# Patient Record
Sex: MALE | Race: OTHER | NOT HISPANIC OR LATINO | URBAN - METROPOLITAN AREA
[De-identification: names, ages, dates, MRNs, and addresses within clinical notes are randomized per-mention and may not be internally consistent; named-entity substitution may affect disease eponyms.]

---

## 2018-05-01 ENCOUNTER — OUTPATIENT (OUTPATIENT)
Dept: OUTPATIENT SERVICES | Facility: HOSPITAL | Age: 63
LOS: 1 days | End: 2018-05-01
Payer: MEDICAID

## 2018-05-03 ENCOUNTER — INPATIENT (INPATIENT)
Facility: HOSPITAL | Age: 63
LOS: 6 days | Discharge: HOME | End: 2018-05-10
Attending: INTERNAL MEDICINE | Admitting: INTERNAL MEDICINE
Payer: MEDICAID

## 2018-05-03 VITALS
SYSTOLIC BLOOD PRESSURE: 121 MMHG | OXYGEN SATURATION: 100 % | RESPIRATION RATE: 16 BRPM | HEART RATE: 85 BPM | TEMPERATURE: 98 F | DIASTOLIC BLOOD PRESSURE: 88 MMHG

## 2018-05-03 DIAGNOSIS — I10 ESSENTIAL (PRIMARY) HYPERTENSION: ICD-10-CM

## 2018-05-03 DIAGNOSIS — Z29.9 ENCOUNTER FOR PROPHYLACTIC MEASURES, UNSPECIFIED: ICD-10-CM

## 2018-05-03 DIAGNOSIS — I20.0 UNSTABLE ANGINA: ICD-10-CM

## 2018-05-03 DIAGNOSIS — N17.9 ACUTE KIDNEY FAILURE, UNSPECIFIED: ICD-10-CM

## 2018-05-03 DIAGNOSIS — Z95.5 PRESENCE OF CORONARY ANGIOPLASTY IMPLANT AND GRAFT: Chronic | ICD-10-CM

## 2018-05-03 DIAGNOSIS — R07.9 CHEST PAIN, UNSPECIFIED: ICD-10-CM

## 2018-05-03 DIAGNOSIS — M1A.0290: ICD-10-CM

## 2018-05-03 DIAGNOSIS — E78.5 HYPERLIPIDEMIA, UNSPECIFIED: ICD-10-CM

## 2018-05-03 DIAGNOSIS — Z95.1 PRESENCE OF AORTOCORONARY BYPASS GRAFT: Chronic | ICD-10-CM

## 2018-05-03 LAB
ALBUMIN SERPL ELPH-MCNC: 3.9 G/DL — SIGNIFICANT CHANGE UP (ref 3.3–5)
ALP SERPL-CCNC: 66 U/L — SIGNIFICANT CHANGE UP (ref 40–120)
ALT FLD-CCNC: 11 U/L — SIGNIFICANT CHANGE UP (ref 4–41)
AST SERPL-CCNC: 16 U/L — SIGNIFICANT CHANGE UP (ref 4–40)
BASE EXCESS BLDV CALC-SCNC: -2.3 MMOL/L — SIGNIFICANT CHANGE UP
BASOPHILS # BLD AUTO: 0.03 K/UL — SIGNIFICANT CHANGE UP (ref 0–0.2)
BASOPHILS NFR BLD AUTO: 0.2 % — SIGNIFICANT CHANGE UP (ref 0–2)
BILIRUB SERPL-MCNC: 0.4 MG/DL — SIGNIFICANT CHANGE UP (ref 0.2–1.2)
BLOOD GAS VENOUS - CREATININE: 2.11 MG/DL — HIGH (ref 0.5–1.3)
BUN SERPL-MCNC: 32 MG/DL — HIGH (ref 7–23)
BUN SERPL-MCNC: 39 MG/DL — HIGH (ref 7–23)
CALCIUM SERPL-MCNC: 9.3 MG/DL — SIGNIFICANT CHANGE UP (ref 8.4–10.5)
CALCIUM SERPL-MCNC: 9.5 MG/DL — SIGNIFICANT CHANGE UP (ref 8.4–10.5)
CHLORIDE BLDV-SCNC: 108 MMOL/L — SIGNIFICANT CHANGE UP (ref 96–108)
CHLORIDE SERPL-SCNC: 98 MMOL/L — SIGNIFICANT CHANGE UP (ref 98–107)
CHLORIDE SERPL-SCNC: 99 MMOL/L — SIGNIFICANT CHANGE UP (ref 98–107)
CHOLEST SERPL-MCNC: 147 MG/DL — SIGNIFICANT CHANGE UP (ref 120–199)
CK MB BLD-MCNC: 1.88 NG/ML — SIGNIFICANT CHANGE UP (ref 1–6.6)
CK MB BLD-MCNC: 2.36 NG/ML — SIGNIFICANT CHANGE UP (ref 1–6.6)
CK MB BLD-MCNC: SIGNIFICANT CHANGE UP (ref 0–2.5)
CK SERPL-CCNC: 35 U/L — SIGNIFICANT CHANGE UP (ref 30–200)
CK SERPL-CCNC: 40 U/L — SIGNIFICANT CHANGE UP (ref 30–200)
CO2 SERPL-SCNC: 20 MMOL/L — LOW (ref 22–31)
CO2 SERPL-SCNC: 24 MMOL/L — SIGNIFICANT CHANGE UP (ref 22–31)
CREAT SERPL-MCNC: 2.08 MG/DL — HIGH (ref 0.5–1.3)
CREAT SERPL-MCNC: 2.13 MG/DL — HIGH (ref 0.5–1.3)
EOSINOPHIL # BLD AUTO: 0.32 K/UL — SIGNIFICANT CHANGE UP (ref 0–0.5)
EOSINOPHIL NFR BLD AUTO: 2.3 % — SIGNIFICANT CHANGE UP (ref 0–6)
GAS PNL BLDV: 129 MMOL/L — LOW (ref 136–146)
GLUCOSE BLDV-MCNC: 99 — SIGNIFICANT CHANGE UP (ref 70–99)
GLUCOSE SERPL-MCNC: 102 MG/DL — HIGH (ref 70–99)
GLUCOSE SERPL-MCNC: 125 MG/DL — HIGH (ref 70–99)
HCO3 BLDV-SCNC: 22 MMOL/L — SIGNIFICANT CHANGE UP (ref 20–27)
HCT VFR BLD CALC: 42.6 % — SIGNIFICANT CHANGE UP (ref 39–50)
HCT VFR BLDV CALC: 43.9 % — SIGNIFICANT CHANGE UP (ref 39–51)
HDLC SERPL-MCNC: 48 MG/DL — SIGNIFICANT CHANGE UP (ref 35–55)
HGB BLD-MCNC: 13.7 G/DL — SIGNIFICANT CHANGE UP (ref 13–17)
HGB BLDV-MCNC: 14.3 G/DL — SIGNIFICANT CHANGE UP (ref 13–17)
IMM GRANULOCYTES # BLD AUTO: 0.12 # — SIGNIFICANT CHANGE UP
IMM GRANULOCYTES NFR BLD AUTO: 0.8 % — SIGNIFICANT CHANGE UP (ref 0–1.5)
LACTATE BLDV-MCNC: 0.8 MMOL/L — SIGNIFICANT CHANGE UP (ref 0.5–2)
LIPID PNL WITH DIRECT LDL SERPL: 80 MG/DL — SIGNIFICANT CHANGE UP
LYMPHOCYTES # BLD AUTO: 14.5 % — SIGNIFICANT CHANGE UP (ref 13–44)
LYMPHOCYTES # BLD AUTO: 2.05 K/UL — SIGNIFICANT CHANGE UP (ref 1–3.3)
MAGNESIUM SERPL-MCNC: 1.9 MG/DL — SIGNIFICANT CHANGE UP (ref 1.6–2.6)
MCHC RBC-ENTMCNC: 27.7 PG — SIGNIFICANT CHANGE UP (ref 27–34)
MCHC RBC-ENTMCNC: 32.2 % — SIGNIFICANT CHANGE UP (ref 32–36)
MCV RBC AUTO: 86.1 FL — SIGNIFICANT CHANGE UP (ref 80–100)
MONOCYTES # BLD AUTO: 1.22 K/UL — HIGH (ref 0–0.9)
MONOCYTES NFR BLD AUTO: 8.6 % — SIGNIFICANT CHANGE UP (ref 2–14)
NEUTROPHILS # BLD AUTO: 10.43 K/UL — HIGH (ref 1.8–7.4)
NEUTROPHILS NFR BLD AUTO: 73.6 % — SIGNIFICANT CHANGE UP (ref 43–77)
NRBC # FLD: 0 — SIGNIFICANT CHANGE UP
PCO2 BLDV: 37 MMHG — LOW (ref 41–51)
PH BLDV: 7.39 PH — SIGNIFICANT CHANGE UP (ref 7.32–7.43)
PLATELET # BLD AUTO: 272 K/UL — SIGNIFICANT CHANGE UP (ref 150–400)
PMV BLD: 10 FL — SIGNIFICANT CHANGE UP (ref 7–13)
PO2 BLDV: 50 MMHG — HIGH (ref 35–40)
POTASSIUM BLDV-SCNC: 4.1 MMOL/L — SIGNIFICANT CHANGE UP (ref 3.4–4.5)
POTASSIUM SERPL-MCNC: 4.4 MMOL/L — SIGNIFICANT CHANGE UP (ref 3.5–5.3)
POTASSIUM SERPL-MCNC: 4.5 MMOL/L — SIGNIFICANT CHANGE UP (ref 3.5–5.3)
POTASSIUM SERPL-SCNC: 4.4 MMOL/L — SIGNIFICANT CHANGE UP (ref 3.5–5.3)
POTASSIUM SERPL-SCNC: 4.5 MMOL/L — SIGNIFICANT CHANGE UP (ref 3.5–5.3)
PROT SERPL-MCNC: 7 G/DL — SIGNIFICANT CHANGE UP (ref 6–8.3)
RBC # BLD: 4.95 M/UL — SIGNIFICANT CHANGE UP (ref 4.2–5.8)
RBC # FLD: 14.4 % — SIGNIFICANT CHANGE UP (ref 10.3–14.5)
SAO2 % BLDV: 81.9 % — SIGNIFICANT CHANGE UP (ref 60–85)
SODIUM SERPL-SCNC: 135 MMOL/L — SIGNIFICANT CHANGE UP (ref 135–145)
SODIUM SERPL-SCNC: 135 MMOL/L — SIGNIFICANT CHANGE UP (ref 135–145)
TRIGL SERPL-MCNC: 131 MG/DL — SIGNIFICANT CHANGE UP (ref 10–149)
TROPONIN T SERPL-MCNC: < 0.06 NG/ML — SIGNIFICANT CHANGE UP (ref 0–0.06)
WBC # BLD: 14.17 K/UL — HIGH (ref 3.8–10.5)
WBC # FLD AUTO: 14.17 K/UL — HIGH (ref 3.8–10.5)

## 2018-05-03 PROCEDURE — 71046 X-RAY EXAM CHEST 2 VIEWS: CPT | Mod: 26

## 2018-05-03 PROCEDURE — 70450 CT HEAD/BRAIN W/O DYE: CPT | Mod: 26

## 2018-05-03 RX ORDER — ATORVASTATIN CALCIUM 80 MG/1
80 TABLET, FILM COATED ORAL AT BEDTIME
Qty: 0 | Refills: 0 | Status: DISCONTINUED | OUTPATIENT
Start: 2018-05-03 | End: 2018-05-10

## 2018-05-03 RX ORDER — FEBUXOSTAT 40 MG/1
80 TABLET ORAL DAILY
Qty: 0 | Refills: 0 | Status: DISCONTINUED | OUTPATIENT
Start: 2018-05-03 | End: 2018-05-10

## 2018-05-03 RX ORDER — LISINOPRIL 2.5 MG/1
5 TABLET ORAL DAILY
Qty: 0 | Refills: 0 | Status: DISCONTINUED | OUTPATIENT
Start: 2018-05-03 | End: 2018-05-10

## 2018-05-03 RX ORDER — AMLODIPINE BESYLATE 2.5 MG/1
5 TABLET ORAL DAILY
Qty: 0 | Refills: 0 | Status: DISCONTINUED | OUTPATIENT
Start: 2018-05-03 | End: 2018-05-10

## 2018-05-03 RX ORDER — ISOSORBIDE MONONITRATE 60 MG/1
90 TABLET, EXTENDED RELEASE ORAL DAILY
Qty: 0 | Refills: 0 | Status: DISCONTINUED | OUTPATIENT
Start: 2018-05-03 | End: 2018-05-10

## 2018-05-03 RX ORDER — ISOSORBIDE MONONITRATE 60 MG/1
1 TABLET, EXTENDED RELEASE ORAL
Qty: 0 | Refills: 0 | COMMUNITY

## 2018-05-03 RX ORDER — CLOPIDOGREL BISULFATE 75 MG/1
75 TABLET, FILM COATED ORAL DAILY
Qty: 0 | Refills: 0 | Status: DISCONTINUED | OUTPATIENT
Start: 2018-05-03 | End: 2018-05-10

## 2018-05-03 RX ORDER — ASPIRIN/CALCIUM CARB/MAGNESIUM 324 MG
162 TABLET ORAL ONCE
Qty: 0 | Refills: 0 | Status: COMPLETED | OUTPATIENT
Start: 2018-05-03 | End: 2018-05-03

## 2018-05-03 RX ORDER — ASPIRIN/CALCIUM CARB/MAGNESIUM 324 MG
81 TABLET ORAL DAILY
Qty: 0 | Refills: 0 | Status: DISCONTINUED | OUTPATIENT
Start: 2018-05-04 | End: 2018-05-10

## 2018-05-03 RX ORDER — METOPROLOL TARTRATE 50 MG
25 TABLET ORAL DAILY
Qty: 0 | Refills: 0 | Status: DISCONTINUED | OUTPATIENT
Start: 2018-05-03 | End: 2018-05-10

## 2018-05-03 RX ADMIN — CLOPIDOGREL BISULFATE 75 MILLIGRAM(S): 75 TABLET, FILM COATED ORAL at 10:56

## 2018-05-03 RX ADMIN — FEBUXOSTAT 80 MILLIGRAM(S): 40 TABLET ORAL at 15:18

## 2018-05-03 RX ADMIN — AMLODIPINE BESYLATE 5 MILLIGRAM(S): 2.5 TABLET ORAL at 15:05

## 2018-05-03 RX ADMIN — Medication 25 MILLIGRAM(S): at 15:05

## 2018-05-03 RX ADMIN — ATORVASTATIN CALCIUM 80 MILLIGRAM(S): 80 TABLET, FILM COATED ORAL at 22:18

## 2018-05-03 RX ADMIN — ISOSORBIDE MONONITRATE 90 MILLIGRAM(S): 60 TABLET, EXTENDED RELEASE ORAL at 10:56

## 2018-05-03 RX ADMIN — LISINOPRIL 5 MILLIGRAM(S): 2.5 TABLET ORAL at 15:05

## 2018-05-03 RX ADMIN — Medication 162 MILLIGRAM(S): at 05:38

## 2018-05-03 NOTE — CONSULT NOTE ADULT - ASSESSMENT
62 yo male PMHx CAD with cardiac stents x2 in 2016, CABG in 2015, HTN, HLD and Gout p/w substernal chest pain woke him up this morning, constant, admitted to tele for Unstable Angina Pectoris.    Problem/Plan - 1:  ·  Problem: Unstable angina pectoris.  Plan: telemonitor   continue ASA, Plavix, Statin and Metoprolol  Ischemia munoz as per cards  will monitor    Problem/Plan - 2:  ·  Problem: JOURDAN (acute kidney injury).  Plan: Monitor electrolytes  monitor cr  renal consult appreciated    Problem/Plan - 3:  ·  Problem: HTN .  Plan: DASH diet  Continue bp meds.     Problem/Plan - 4:  ·  Problem: HLD (hyperlipidemia).  Plan: continue statin.     Problem/Plan - 5:  ·  Problem: Chronic gout of elbow, unspecified cause, unspecified laterality.  Plan: continue uloric.

## 2018-05-03 NOTE — CONSULT NOTE ADULT - SUBJECTIVE AND OBJECTIVE BOX
cc chest pain  [place] 62 year-old man with history of hypertension, hyperlipidemia, gout, and coronary artery disease s/p CABG in 2015, who [facility] with chest pain and shortness of breath since Monday (3 days ago). He states that he feels well at present. Bloodwork from the ER was notable for creatinine of 2.08mg/dL; therefore, a renal consultation was requested. He tells me that he sees physicians at "[facility]" - he does not recall being told in the past that he has CKD. He denies bloodiness or foaminess of his urine. He denies recent urinary changes such as reduction in urine output. He denies NSAID use for his gout; he says that he takes Uloric for his gout.    Allergies NKDA    REVIEW OF SYSTEMS:    CONSTITUTIONAL: No weakness, fevers or chills  EYES/ENT: No visual changes;  No vertigo or throat pain   NECK: No pain or stiffness  RESPIRATORY: No cough, wheezing, hemoptysis; No shortness of breath  CARDIOVASCULAR: No chest pain or palpitations  GASTROINTESTINAL: No abdominal or epigastric pain. No nausea, vomiting, or hematemesis; No diarrhea or constipation. No melena or hematochezia.  GENITOURINARY: No dysuria, frequency or hematuria  NEUROLOGICAL: No numbness or weakness  SKIN: No itching, burning, rashes, or lesions   All other review of systems is negative unless indicated above.    Home Medications:   * Patient Currently Takes Medications as of 03-May-2018 10:12 documented in Structured Notes  · 	Metoprolol Succinate ER 25 mg oral tablet, extended release: 1 tab(s) orally once a day, Last Dose Taken:    · 	Ecotrin Adult Low Strength 81 mg oral delayed release tablet: 1 tab(s) orally once a day, Last Dose Taken:    · 	amLODIPine 5 mg oral tablet: 1 tab(s) orally once a day, Last Dose Taken:    · 	Plavix 75 mg oral tablet: 1 tab(s) orally once a day, Last Dose Taken:    · 	Crestor 40 mg oral tablet: 1 tab(s) orally once a day (at bedtime), Last Dose Taken:    · 	lisinopril 5 mg oral tablet: 1 tab(s) orally once a day, Last Dose Taken:    · 	Uloric 80 mg oral tablet: 1 tab(s) orally once a day, Last Dose Taken:    · 	isosorbide mononitrate 30 mg oral tablet, extended release: 3 tab(s) orally once a day (in the morning), Last Dose Taken:      Patient History:   Past Medical History:  CAD (coronary artery disease)    Chronic gout of elbow, unspecified cause, unspecified laterality    HLD (hyperlipidemia)    HTN (hypertension).    Past Surgical History:  H/O heart artery stent  2016  S/P CABG x 3  2015.    Social History:  Social History (marital status, living situation, occupation, tobacco use, alcohol and drug use, and sexual history): Pt , lives with spouse. Denies smoking, drinking or drugs.	      Physical exam    General: WN/WD NAD  PERRLA  Neurology: A&Ox3, nonfocal, BAKER x 4  Respiratory: CTA B/L  CV: RRR, S1S2, no murmurs, rubs or gallops  Abdominal: Soft, NT, ND +BS, Last BM  Extremities: No edema, + peripheral pulses  Skin Normal     labs    Lab Results:  CBC  CBC Full  -  ( 03 May 2018 04:30 )  WBC Count : 14.17 K/uL  Hemoglobin : 13.7 g/dL  Hematocrit : 42.6 %  Platelet Count - Automated : 272 K/uL  Mean Cell Volume : 86.1 fL  Mean Cell Hemoglobin : 27.7 pg  Mean Cell Hemoglobin Concentration : 32.2 %  Auto Neutrophil # : 10.43 K/uL  Auto Lymphocyte # : 2.05 K/uL  Auto Monocyte # : 1.22 K/uL  Auto Eosinophil # : 0.32 K/uL  Auto Basophil # : 0.03 K/uL  Auto Neutrophil % : 73.6 %  Auto Lymphocyte % : 14.5 %  Auto Monocyte % : 8.6 %  Auto Eosinophil % : 2.3 %  Auto Basophil % : 0.2 %    .		Differential:	[] Automated		[] Manual  Chemistry                        13.7   14.17 )-----------( 272      ( 03 May 2018 04:30 )             42.6     05-03    135  |  99  |  32<H>  ----------------------------<  102<H>  4.5   |  20<L>  |  2.08<H>    Ca    9.3      03 May 2018 04:30    TPro  7.0  /  Alb  3.9  /  TBili  0.4  /  DBili  x   /  AST  16  /  ALT  11  /  AlkPhos  66  05-03    LIVER FUNCTIONS - ( 03 May 2018 04:30 )  Alb: 3.9 g/dL / Pro: 7.0 g/dL / ALK PHOS: 66 u/L / ALT: 11 u/L / AST: 16 u/L / GGT: x                     MICROBIOLOGY/CULTURES:      RADIOLOGY RESULTS: reviewed

## 2018-05-03 NOTE — H&P ADULT - PROBLEM SELECTOR PLAN 1
tele monitor   cardiac enzymes x 2  Serial EKGs  DASH diet  continue ASA, Plavix, Statin and Metoprolol  consider cardiac cath

## 2018-05-03 NOTE — H&P ADULT - PMH
CAD (coronary artery disease)    Chronic gout of elbow, unspecified cause, unspecified laterality    HLD (hyperlipidemia)    HTN (hypertension)

## 2018-05-03 NOTE — ED PROVIDER NOTE - OBJECTIVE STATEMENT
62M h/o HTN, CAD s/p stents x2 and CABG in 2015 p/w L-sided CP x2 days. The pain is constant, no modifying factors, radiates to the L shoulder a/w SOB and dizziness. No fever, cough, N/V. No hx of DVT/PE, calf pain/swelling, hemoptysis, or recent trauma/surgery/immobilization.

## 2018-05-03 NOTE — H&P ADULT - RS GEN PE MLT RESP DETAILS PC
no chest wall tenderness/no wheezes/clear to auscultation bilaterally/respirations non-labored/no rhonchi/good air movement/no rales/breath sounds equal

## 2018-05-03 NOTE — H&P ADULT - ATTENDING COMMENTS
Patient seen and examined, agree with above assessment and plan as transcribed above.    briefly 63 yo M hx of CAD, cabg 2015 PCI 2016 normal nuc 1 month ago at [facility] admitted with recurrent CP r/o for MI found with renal dysfunction of unknown chronicity    - Renal eval  - Cath when ok with renal  - Head ct given recent mechanical fall    Abhijeet Eisenberg MD, FACC

## 2018-05-03 NOTE — H&P ADULT - ASSESSMENT
62 yo male PMHx CAD with cardiac stents x2 in 2016, CABG in 2015, HTN, HLD and Gout p/w substernal chest pain woke him up this morning, constant, admitted to tele for Unstable Angina Pectoris.

## 2018-05-03 NOTE — ED ADULT NURSE NOTE - OBJECTIVE STATEMENT
pt a&o x3 c/o left sided chest pain x 2 days. states chest pain radiates to left shoulder and reports dizziness and sob. vss. nsr on monitor. md assessed. right ac 20g placed. nad noted. will monitor

## 2018-05-03 NOTE — CONSULT NOTE ADULT - SUBJECTIVE AND OBJECTIVE BOX
HPI:  Mr. Garcia is a 62 year-old man with history of hypertension, hyperlipidemia, gout, and coronary artery disease s/p CABG in 2015, who presented today to the [facility] ER with chest pain and shortness of breath for 1 week; his symptoms acutely worsened today. Bloodwork from the ER was notable for creatinine of 2.08mg/dL; therefore, a renal consultation was requested.    PAST MEDICAL & SURGICAL HISTORY:  Chronic gout of elbow, unspecified cause, unspecified laterality  HLD (hyperlipidemia)  CAD (coronary artery disease)  HTN (hypertension)  S/P CABG x 3: 2015  H/O heart artery stent: 2016    MEDICATIONS  (STANDING):  amLODIPine   Tablet 5 milliGRAM(s) Oral daily  aspirin enteric coated 81 milliGRAM(s) Oral daily  atorvastatin 80 milliGRAM(s) Oral at bedtime  clopidogrel Tablet 75 milliGRAM(s) Oral daily  febuxostat 80 milliGRAM(s) Oral daily  isosorbide   mononitrate ER Tablet (IMDUR) 90 milliGRAM(s) Oral daily  lisinopril 5 milliGRAM(s) Oral daily  metoprolol succinate ER 25 milliGRAM(s) Oral daily    Allergies  Intolerances    SOCIAL HISTORY:  Denies ETOh,Smoking,     FAMILY HISTORY:  No CKD    REVIEW OF SYSTEMS:  CONSTITUTIONAL: No weakness, fevers or chills  EYES/ENT: No visual changes;  No vertigo or throat pain   NECK: No pain or stiffness  RESPIRATORY: No cough, wheezing, hemoptysis; No shortness of breath  CARDIOVASCULAR: No chest pain or palpitations  GASTROINTESTINAL: No abdominal or epigastric pain. No nausea, vomiting, or hematemesis; No diarrhea or constipation. No melena or hematochezia.  GENITOURINARY: No dysuria, frequency or hematuria  NEUROLOGICAL: No numbness or weakness  SKIN: No itching, burning, rashes, or lesions   All other review of systems is negative unless indicated above.    VITAL:  T(C): , Max: 36.8 (05-03-18 @ 12:43)  T(F): , Max: 98.3 (05-03-18 @ 12:43)  HR: 73 (05-03-18 @ 12:43)  BP: 119/66 (05-03-18 @ 12:43)  RR: 17 (05-03-18 @ 12:43)  SpO2: 100% (05-03-18 @ 12:43)  Wt(kg): --    PHYSICAL EXAM:  Constitutional: NAD, Alert  HEENT: NCAT, MMM  Neck: Supple, No JVD  Respiratory: CTA-b/l  Cardiovascular: RRR s1s2, no m/r/g  Gastrointestinal: BS+, soft, NT/ND  Extremities: No peripheral edema b/l  Neurological: no focal deficits; strength grossly intact  Psychiatric: Normal mood, normal affect  Back: no CVAT b/l  Skin: No rashes, no nevi    LABS:                        13.7   14.17 )-----------( 272      ( 03 May 2018 04:30 )             42.6     Na(135)/K(4.5)/Cl(99)/HCO3(20)/BUN(32)/Cr(2.08)Glu(102)/Ca(9.3)/Mg(--)/PO4(--)    05-03 @ 04:30      IMAGING:  < from: Xray Chest 2 Views PA/Lat (05.03.18 @ 04:53) >  Clear lungs.    ASSESSMENT:      RECOMMEND:      Thank you for involving [facility] in this patient's care.    With warm regards,    Jono Andino MD   [facility]  [phone] HPI:  Mr. Garcia is a 62 year-old man with history of hypertension, hyperlipidemia, gout, and coronary artery disease s/p CABG in 2015, who presented today to the [facility] ER with chest pain and shortness of breath since Monday (3 days ago). He states that he feels well at present. Bloodwork from the ER was notable for creatinine of 2.08mg/dL; therefore, a renal consultation was requested. He tells me that he sees physicians at "[facility]" - he does not recall being told in the past that he has CKD. He denies bloodiness or foaminess of his urine. He denies recent urinary changes such as reduction in urine output. He denies NSAID use for his gout; he says that he takes Uloric for his gout.    PAST MEDICAL & SURGICAL HISTORY:  Chronic gout of elbow, unspecified cause, unspecified laterality  HLD (hyperlipidemia)  CAD (coronary artery disease)  HTN (hypertension)  S/P CABG x 3: 2015  H/O heart artery stent: 2016    MEDICATIONS  (STANDING):  amLODIPine   Tablet 5 milliGRAM(s) Oral daily  aspirin enteric coated 81 milliGRAM(s) Oral daily  atorvastatin 80 milliGRAM(s) Oral at bedtime  clopidogrel Tablet 75 milliGRAM(s) Oral daily  febuxostat 80 milliGRAM(s) Oral daily  isosorbide   mononitrate ER Tablet (IMDUR) 90 milliGRAM(s) Oral daily  lisinopril 5 milliGRAM(s) Oral daily  metoprolol succinate ER 25 milliGRAM(s) Oral daily    Allergies  Intolerances    SOCIAL HISTORY:  Denies ETOh,Smoking,     FAMILY HISTORY:  No CKD    REVIEW OF SYSTEMS:  CONSTITUTIONAL: No weakness, fevers or chills  EYES/ENT: No visual changes;  No vertigo or throat pain   NECK: No pain or stiffness  RESPIRATORY: No cough, wheezing, hemoptysis; No shortness of breath  CARDIOVASCULAR: No chest pain or palpitations  GASTROINTESTINAL: No abdominal or epigastric pain. No nausea, vomiting, or hematemesis; No diarrhea or constipation. No melena or hematochezia.  GENITOURINARY: No dysuria, frequency or hematuria  NEUROLOGICAL: No numbness or weakness  SKIN: No itching, burning, rashes  All other review of systems is negative unless indicated above.    VITAL:  T(C): , Max: 36.8 (05-03-18 @ 12:43)  T(F): , Max: 98.3 (05-03-18 @ 12:43)  HR: 73 (05-03-18 @ 12:43)  BP: 119/66 (05-03-18 @ 12:43)  RR: 17 (05-03-18 @ 12:43)  SpO2: 100% (05-03-18 @ 12:43)  Wt(kg): --    PHYSICAL EXAM:  Constitutional: NAD, Alert; frail  HEENT: NCAT, MMM; (+)poor dentition  Neck: Supple, No JVD  Respiratory: CTA-b/l  Cardiovascular: RRR s1s2, no m/r/g  Gastrointestinal: BS+, soft, NT/ND  Extremities: No peripheral edema b/l  Neurological: no focal deficits; strength grossly intact  Psychiatric: Normal mood, normal affect  Back: no CVAT b/l  Skin: No rashes, no nevi (+)large tophi, including a [id number] lesion of his right elbow.      LABS:                        13.7   14.17 )-----------( 272      ( 03 May 2018 04:30 )             42.6     Na(135)/K(4.5)/Cl(99)/HCO3(20)/BUN(32)/Cr(2.08)Glu(102)/Ca(9.3)/Mg(--)/PO4(--)    05-03 @ 04:30      IMAGING:  < from: Xray Chest 2 Views PA/Lat (05.03.18 @ 04:53) >  Clear lungs.    < from: CT Head No Cont (05.03.18 @ 12:44) >  No evidence for calvarial fracture or acute intracranial hemorrhage. If   the patient has new and persistent symptoms, consider short interval   follow-up head CT or brain MRI follow-up if there are no MRI   contraindications.    Moderate dilatation of the lateral and third ventricles is noted as   described. Some considerations include chronic hydrocephalus, normal   pressure hydrocephalus, or central greater than cortical atrophy.      ASSESSMENT:  (1)Renal - creatinine of 2.08mg/dL, corresponding to GFR of ~25-30ml/min;  we presently do not have access to past bloodwork. Most likely his azotemia is chronic in nature; I see no reason for him to have acute kidney injury at present time.  (2)Neuro - hydrocephalus?  (3)Chest pain - may need cath. No renal objection to proceeding; it appears that his renal function is at baseline.    RECOMMEND:  (1)Renal ultrasound  (2)Urine: lytes, creatinine, urinalysis  (3)BMP daily for now  (4)Check serum uric acid  (5)Workup of hydrocephalus per primary team/Neuro - no objection to Gadolinium if MRI is planned; favor avoidance of CT I+ of head  (6)No renal objection to cardiac cath; would give NS 250cc bolus pre cath and 125cc/h x 4 hours post cath if needed  (7)Dose new meds for GFR 25-30ml/min    Thank you for involving [facility] in this patient's care.    With warm regards,    Jono Andino MD   [facility]  [phone]

## 2018-05-03 NOTE — ED ADULT TRIAGE NOTE - CHIEF COMPLAINT QUOTE
Pt C/O left sided CP, SOB x 1 week ,worsening today. Pt appears comfortbale, respirations even and unlabored, speaking in full sentences without difficulty. PMH CABG 2015, 2 cardiac stents: on Plavix, HTN. EKG in progress.

## 2018-05-03 NOTE — ED PROVIDER NOTE - MEDICAL DECISION MAKING DETAILS
CP concerning for ACS. Clinically not PE (Wells = 0), tamponade, dissection, PTX, perf, myocarditis, or mediastinitis. Plan: ekg, cxr, labs, aspirin, admit tele.

## 2018-05-03 NOTE — ED PROVIDER NOTE - SKIN, MLM
Adequate: hears normal conversation without difficulty Skin normal color for race, warm, dry and intact. No evidence of rash.

## 2018-05-03 NOTE — ED ADULT NURSE REASSESSMENT NOTE - NS ED NURSE REASSESS COMMENT FT1
Report rcvd from prior RN. Pt awake/alert, aox3, ambulatory, in NAD. Respirations even/unlabored. Denies pain/discomforts at this time. NSR on cardiac monitor. Awaiting bed assignment. 20 g IV observed to R ac IV dry/intact/patent. Will continue to monitor.

## 2018-05-03 NOTE — ED PROVIDER NOTE - ATTENDING CONTRIBUTION TO CARE
pt with CAD CABG stents and HTN with episodes of exertional CP associated with SOB.  Reports that he had a stress at [facility] one month ago that he reported was negative and gets all of his care there.  No cardiologist at this facility.  Has had this happen in the past as well.  Took an ASA today already    Gen: Well appearing in NAD  Head: NC/AT  Neck: trachea midline  Resp:  No distress - CTAB  CV: RRR  Ext: no deformities  Neuro:  A&O appears non focal  Skin:  Warm and dry as visualized  Psych:  Normal affect and mood     Pt presenting with CP and SOB.  With ethnicity and risk factors need to be concerned about an ACS even in the setting of a recent stress.  Pt is questionable historian so will admit to the tele doc of day.  EKG not concerning for acute ischemia at this time

## 2018-05-04 LAB
APPEARANCE UR: CLEAR — SIGNIFICANT CHANGE UP
BILIRUB UR-MCNC: NEGATIVE — SIGNIFICANT CHANGE UP
BLOOD UR QL VISUAL: NEGATIVE — SIGNIFICANT CHANGE UP
BUN SERPL-MCNC: 39 MG/DL — HIGH (ref 7–23)
CALCIUM SERPL-MCNC: 9.2 MG/DL — SIGNIFICANT CHANGE UP (ref 8.4–10.5)
CHLORIDE SERPL-SCNC: 99 MMOL/L — SIGNIFICANT CHANGE UP (ref 98–107)
CHLORIDE UR-SCNC: 24 MMOL/L — SIGNIFICANT CHANGE UP
CO2 SERPL-SCNC: 20 MMOL/L — LOW (ref 22–31)
COLOR SPEC: YELLOW — SIGNIFICANT CHANGE UP
CREAT ?TM UR-MCNC: 126.35 MG/DL — SIGNIFICANT CHANGE UP
CREAT SERPL-MCNC: 2.08 MG/DL — HIGH (ref 0.5–1.3)
GLUCOSE SERPL-MCNC: 89 MG/DL — SIGNIFICANT CHANGE UP (ref 70–99)
GLUCOSE UR-MCNC: NEGATIVE — SIGNIFICANT CHANGE UP
HCT VFR BLD CALC: 44.8 % — SIGNIFICANT CHANGE UP (ref 39–50)
HGB BLD-MCNC: 13.9 G/DL — SIGNIFICANT CHANGE UP (ref 13–17)
KETONES UR-MCNC: NEGATIVE — SIGNIFICANT CHANGE UP
LEUKOCYTE ESTERASE UR-ACNC: NEGATIVE — SIGNIFICANT CHANGE UP
MCHC RBC-ENTMCNC: 27.6 PG — SIGNIFICANT CHANGE UP (ref 27–34)
MCHC RBC-ENTMCNC: 31 % — LOW (ref 32–36)
MCV RBC AUTO: 88.9 FL — SIGNIFICANT CHANGE UP (ref 80–100)
MUCOUS THREADS # UR AUTO: SIGNIFICANT CHANGE UP
NITRITE UR-MCNC: NEGATIVE — SIGNIFICANT CHANGE UP
NRBC # FLD: 0 — SIGNIFICANT CHANGE UP
PH UR: 6.5 — SIGNIFICANT CHANGE UP (ref 4.6–8)
PLATELET # BLD AUTO: 268 K/UL — SIGNIFICANT CHANGE UP (ref 150–400)
PMV BLD: 10.5 FL — SIGNIFICANT CHANGE UP (ref 7–13)
POTASSIUM SERPL-MCNC: 4.3 MMOL/L — SIGNIFICANT CHANGE UP (ref 3.5–5.3)
POTASSIUM SERPL-SCNC: 4.3 MMOL/L — SIGNIFICANT CHANGE UP (ref 3.5–5.3)
POTASSIUM UR-SCNC: 37.7 MMOL/L — SIGNIFICANT CHANGE UP
PROT UR-MCNC: NEGATIVE MG/DL — SIGNIFICANT CHANGE UP
RBC # BLD: 5.04 M/UL — SIGNIFICANT CHANGE UP (ref 4.2–5.8)
RBC # FLD: 14.7 % — HIGH (ref 10.3–14.5)
RBC CASTS # UR COMP ASSIST: HIGH (ref 0–?)
SODIUM SERPL-SCNC: 134 MMOL/L — LOW (ref 135–145)
SODIUM UR-SCNC: 60 MMOL/L — SIGNIFICANT CHANGE UP
SP GR SPEC: 1.01 — SIGNIFICANT CHANGE UP (ref 1–1.04)
SQUAMOUS # UR AUTO: SIGNIFICANT CHANGE UP
URATE SERPL-MCNC: 5.9 MG/DL — SIGNIFICANT CHANGE UP (ref 3.4–8.8)
UROBILINOGEN FLD QL: NORMAL MG/DL — SIGNIFICANT CHANGE UP
WBC # BLD: 11.31 K/UL — HIGH (ref 3.8–10.5)
WBC # FLD AUTO: 11.31 K/UL — HIGH (ref 3.8–10.5)
WBC UR QL: SIGNIFICANT CHANGE UP (ref 0–?)

## 2018-05-04 PROCEDURE — 76775 US EXAM ABDO BACK WALL LIM: CPT | Mod: 26

## 2018-05-04 RX ADMIN — LISINOPRIL 5 MILLIGRAM(S): 2.5 TABLET ORAL at 05:17

## 2018-05-04 RX ADMIN — FEBUXOSTAT 80 MILLIGRAM(S): 40 TABLET ORAL at 14:10

## 2018-05-04 RX ADMIN — ISOSORBIDE MONONITRATE 90 MILLIGRAM(S): 60 TABLET, EXTENDED RELEASE ORAL at 12:53

## 2018-05-04 RX ADMIN — Medication 25 MILLIGRAM(S): at 05:17

## 2018-05-04 RX ADMIN — ATORVASTATIN CALCIUM 80 MILLIGRAM(S): 80 TABLET, FILM COATED ORAL at 21:12

## 2018-05-04 RX ADMIN — AMLODIPINE BESYLATE 5 MILLIGRAM(S): 2.5 TABLET ORAL at 05:17

## 2018-05-04 RX ADMIN — CLOPIDOGREL BISULFATE 75 MILLIGRAM(S): 75 TABLET, FILM COATED ORAL at 12:53

## 2018-05-04 RX ADMIN — Medication 81 MILLIGRAM(S): at 12:53

## 2018-05-04 NOTE — CONSULT NOTE ADULT - SUBJECTIVE AND OBJECTIVE BOX
[facility]), [facility]        Patient is a 62y old  Male who presents with a chief complaint of     HPI:           *****PAST MEDICAL / Surgical  HISTORY:  PAST MEDICAL & SURGICAL HISTORY:  Chronic gout of elbow, unspecified cause, unspecified laterality  HLD (hyperlipidemia)  CAD (coronary artery disease)  HTN (hypertension)  S/P CABG x 3:   H/O heart artery stent: 2016           *****FAMILY HISTORY:  FAMILY HISTORY:           *****SOCIAL HISTORY:  Alcohol: None  Smoking: None         *****ALLERGIES:   Allergies    No Known Allergies    Intolerances             *****MEDICATIONS: current medication reviewed and documented.   MEDICATIONS  (STANDING):  amLODIPine   Tablet 5 milliGRAM(s) Oral daily  aspirin enteric coated 81 milliGRAM(s) Oral daily  atorvastatin 80 milliGRAM(s) Oral at bedtime  clopidogrel Tablet 75 milliGRAM(s) Oral daily  febuxostat 80 milliGRAM(s) Oral daily  isosorbide   mononitrate ER Tablet (IMDUR) 90 milliGRAM(s) Oral daily  lisinopril 5 milliGRAM(s) Oral daily  metoprolol succinate ER 25 milliGRAM(s) Oral daily    MEDICATIONS  (PRN):           *****REVIEW OF SYSTEM:  GEN: no fever, no chills, no pain  RESP: no SOB, no cough, no sputum  CVS: no chest pain, no palpitations, no edema  GI: no abdominal pain, no nausea, no vomiting, no constipation, no diarrhea  : no dysurea, no frequency, no hematurea  Neuro: no headache, no dizziness  PSYCH: no anxiety, no depression  Derm : no itching, no rash         *****VITAL SIGNS:  T(C): 36.7 (18 @ 05:15), Max: 36.8 (18 @ 12:43)  HR: 83 (18 @ 05:15) (68 - 95)  BP: 118/76 (18 @ 05:15) (94/66 - 121/66)  RR: 16 (18 @ 05:15) (16 - 18)  SpO2: 100% (18 @ 05:15) (98% - 100%)  Wt(kg): --     @ 07:01  -   @ 07:00  --------------------------------------------------------  IN: 100 mL / OUT: 0 mL / NET: 100 mL    04 @ 07:01  -  04 @ 12:07  --------------------------------------------------------  IN: 0 mL / OUT: 300 mL / NET: -300 mL             *****PHYSICAL EXAM:  GEN: A&O X 3 , NAD , comfortable  HEENT: NCAT, PERRL, MMM, hearing intact  Neck: supple , no JVD  CVS: S1S2 , regular , No M/R/G appreciated  PULM: CTA B/L,  no W/R/R appreciated  ABD.: soft. non tender, non distended,  bowel sounds present  Extrem: intact pulses , no edema   Derm: No rash , no ecchymoses  PSYCH : normal mood,  no delusion not anxious      Alert oriented x 3   Attention comprehension are fair. Able to name, repeat, read without any difficulty.   Able to follow 3 step commands.     EOMI fundi not visualized,  VFF to confrontration  No facial asymmetry   Tongue is midline   Palate elevates symmetrically   Moving all 4 ext symmetrically no pronator drift.  Reflexes are symmetric throughout   sensation is grossly symmetric  Gait : not assessed.  B/L down going toes        >>> <<<         *****LAB AND IMAGIN.9   11.31 )-----------( 268      ( 04 May 2018 06:55 )             44.8                   134<L>  |  99  |  39<H>  ----------------------------<  89  4.3   |  20<L>  |  2.08<H>    Ca    9.2      04 May 2018 06:55  Mg     1.9         TPro  7.0  /  Alb  3.9  /  TBili  0.4  /  DBili  x   /  AST  16  /  ALT  11  /  AlkPhos  66                  CARDIAC MARKERS ( 03 May 2018 20:40 )  x     / < 0.06 ng/mL / 35 u/L / 1.88 ng/mL / x      CARDIAC MARKERS ( 03 May 2018 10:22 )  x     / < 0.06 ng/mL / 40 u/L / 2.36 ng/mL / x      CARDIAC MARKERS ( 03 May 2018 04:30 )  x     / < 0.06 ng/mL / x     / x     / x                  Urinalysis Basic - ( 04 May 2018 06:23 )    Color: YELLOW / Appearance: CLEAR / S.014 / pH: 6.5  Gluc: NEGATIVE / Ketone: NEGATIVE  / Bili: NEGATIVE / Urobili: NORMAL mg/dL   Blood: NEGATIVE / Protein: NEGATIVE mg/dL / Nitrite: NEGATIVE   Leuk Esterase: NEGATIVE / RBC: 5-10 / WBC 0-2   Sq Epi: OCC / Non Sq Epi: x / Bacteria: x        [All pertinent recent Imaging reports reviewed]         *****A S S E S S M E N T   A N D   P L A N :            80 minutes spent on the total encounter;  more than 50 % of the visit was spent on counseling  and or coordinating care by the attending physician.    Thank you for allowing me to participate in the care of this lucius patient. Please do not hesitate to call me if you have any questions.   ___________________________  Will follow with you.  Thank you,  Joanna Pace MD  Diplomate of the American Board of Neurology and Psychiatry.  Diplomate of the American Board of Vascular Neurology.   [facility] ([facility]), [facility]   Ph: [phone]      This and subsequent notes were partially created using voice recognition software and will  inherently be subject to errors including those of syntax and sound alike substitutions which may escape proofreading. In such instances original meaning may be extrapolated by contextual derivation. [facility]), [facility]        Patient is a 62y old  Male who presents with a chief complaint of   HPI:62y Male w/ hx of CAD, cabg  PCI 2016 normal nuc 1 month ago at [facility] admitted with recurrent CP r/o for MI found with renal dysfunction of unknown chronicity           *****PAST MEDICAL / Surgical  HISTORY:  PAST MEDICAL & SURGICAL HISTORY:  Chronic gout of elbow, unspecified cause, unspecified laterality  HLD (hyperlipidemia)  CAD (coronary artery disease)  HTN (hypertension)  S/P CABG x 3:   H/O heart artery stent: 2016           *****FAMILY HISTORY:  FAMILY HISTORY:           *****SOCIAL HISTORY:  Alcohol: None  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       *****ALLERGIES:   Allergies    No Known Allergies    Intolerances             *****MEDICATIONS: current medication reviewed and documented.   MEDICATIONS  (STANDING):  amLODIPine   Tablet 5 milliGRAM(s) Oral daily  aspirin enteric coated 81 milliGRAM(s) Oral daily  atorvastatin 80 milliGRAM(s) Oral at bedtime  clopidogrel Tablet 75 milliGRAM(s) Oral daily  febuxostat 80 milliGRAM(s) Oral daily  isosorbide   mononitrate ER Tablet (IMDUR) 90 milliGRAM(s) Oral daily  lisinopril 5 milliGRAM(s) Oral daily  metoprolol succinate ER 25 milliGRAM(s) Oral daily    MEDICATIONS  (PRN):           *****REVIEW OF SYSTEM:  GEN: no fever, no chills, no pain  RESP: no SOB, no cough, no sputum  CVS: no chest pain, no palpitations, no edema  GI: no abdominal pain, no nausea, no vomiting, no constipation, no diarrhea  : no dysurea, no frequency, no hematurea  Neuro: no headache, no dizziness  PSYCH: no anxiety, no depression  Derm : no itching, no rash         *****VITAL SIGNS:  T(C): 36.7 (18 @ 05:15), Max: 36.8 (18 @ 12:43)  HR: 83 (18 @ 05:15) (68 - 95)  BP: 118/76 (18 @ 05:15) (94/66 - 121/66)  RR: 16 (18 @ 05:15) (16 - 18)  SpO2: 100% (18 @ 05:15) (98% - 100%)  Wt(kg): --     @ 07:01  -   @ 07:00  --------------------------------------------------------  IN: 100 mL / OUT: 0 mL / NET: 100 mL     @ 07:01  -   @ 12:07  --------------------------------------------------------  IN: 0 mL / OUT: 300 mL / NET: -300 mL             *****PHYSICAL EXAM:  GEN: A&O X 3 , NAD , comfortable  HEENT: NCAT, PERRL, MMM, hearing intact  Neck: supple , no JVD  CVS: S1S2 , regular , No M/R/G appreciated  PULM: CTA B/L,  no W/R/R appreciated  ABD.: soft. non tender, non distended,  bowel sounds present  Extrem: intact pulses , no edema   Derm: No rash , no ecchymoses  PSYCH : normal mood,  no delusion not anxious      Alert oriented x 3   Attention comprehension are fair. Able to name, repeat, read without any difficulty.   Able to follow 3 step commands.     EOMI fundi not visualized,  VFF to confrontration  No facial asymmetry   Tongue is midline   Palate elevates symmetrically   Moving all 4 ext symmetrically no pronator drift.  Reflexes are symmetric throughout   sensation is grossly symmetric  Gait : not assessed.  B/L down going toes        >>> <<<         *****LAB AND IMAGIN.9   11.31 )-----------( 268      ( 04 May 2018 06:55 )             44.8                   134<L>  |  99  |  39<H>  ----------------------------<  89  4.3   |  20<L>  |  2.08<H>    Ca    9.2      04 May 2018 06:55  Mg     1.9     -    TPro  7.0  /  Alb  3.9  /  TBili  0.4  /  DBili  x   /  AST  16  /  ALT  11  /  AlkPhos  66  05-03                CARDIAC MARKERS ( 03 May 2018 20:40 )  x     / < 0.06 ng/mL / 35 u/L / 1.88 ng/mL / x      CARDIAC MARKERS ( 03 May 2018 10:22 )  x     / < 0.06 ng/mL / 40 u/L / 2.36 ng/mL / x      CARDIAC MARKERS ( 03 May 2018 04:30 )  x     / < 0.06 ng/mL / x     / x     / x                  Urinalysis Basic - ( 04 May 2018 06:23 )    Color: YELLOW / Appearance: CLEAR / S.014 / pH: 6.5  Gluc: NEGATIVE / Ketone: NEGATIVE  / Bili: NEGATIVE / Urobili: NORMAL mg/dL   Blood: NEGATIVE / Protein: NEGATIVE mg/dL / Nitrite: NEGATIVE   Leuk Esterase: NEGATIVE / RBC: 5-10 / WBC 0-2   Sq Epi: OCC / Non Sq Epi: x / Bacteria: x    < from: CT Head No Cont (18 @ 12:44) >  No evidence for calvarial fracture or acute intracranial hemorrhage. If   the patient has new and persistent symptoms, consider short interval   follow-up head CT or brain MRI follow-up if there are no MRI   contraindications.    Moderate dilatation of the lateral and third ventricles is noted as   described. Some considerations include chronic hydrocephalus, normal   pressure hydrocephalus, or central greater than cortical atrophy.          < end of copied text >      [All pertinent recent Imaging reports reviewed]         *****A S S E S S M E N T   A N D   P L A N :  62y Male w/ hx of CAD, cabg  PCI 2016 normal nuc 1 month ago at [facility] admitted with recurrent CP r/o for MI found with renal dysfunction of unknown chronicity  ct head shows dilated ventricles without any clinical symptoms of nph, such as memory loss, magnetic gait or incontinence.  PT [place] likely congential [place] denies any complications during birth or during development.   PT should follow up with outpt neurologist as this may evolve.  will continue to follow up while he is inhouse.  check b12, folate, tsh, rpr,       60 minutes spent on the total encounter;  more than 50 % of the visit was spent on counseling  and or coordinating care by the attending physician.    Thank you for allowing me to participate in the care of this lucius patient. Please do not hesitate to call me if you have any questions.   ___________________________  Will follow with you.  Thank you,  Joanna Pace MD  Diplomate of the American Board of Neurology and Psychiatry.  Diplomate of the American Board of Vascular Neurology.   [facility] ([facility]), [facility]   Ph: [phone]      This and subsequent notes were partially created using voice recognition software and will  inherently be subject to errors including those of syntax and sound alike substitutions which may escape proofreading. In such instances original meaning may be extrapolated by contextual derivation.

## 2018-05-04 NOTE — PROGRESS NOTE ADULT - ATTENDING COMMENTS
CARDIOLOGY ATTENDING    Patient seen and examined. Agree with above. Admitted with recurrent angina despite unremarkable NST at outside hospital. Recommend cath when cleared by neuro and renal.

## 2018-05-04 NOTE — PROGRESS NOTE ADULT - SUBJECTIVE AND OBJECTIVE BOX
No pain, no shortness of breath      VITAL:  T(C): , Max: 36.8 (18 @ 12:43)  T(F): , Max: 98.3 (18 @ 12:43)  HR: 83 (18 @ 05:15)  BP: 118/76 (18 @ 05:15)  RR: 16 (18 @ 05:15)  SpO2: 100% (18 @ 05:15)      PHYSICAL EXAM:  Constitutional: NAD, Alert; frail  HEENT: NCAT, MMM; (+)poor dentition  Neck: Supple, No JVD  Respiratory: CTA-b/l  Cardiovascular: RRR s1s2, no m/r/g  Gastrointestinal: BS+, soft, NT/ND  Extremities: No peripheral edema b/l  Neurological: no focal deficits; strength grossly intact  Psychiatric: Normal mood, normal affect  Back: no CVAT b/l  Skin: No rashes, no nevi (+)large tophi, including a ukji-opkt-tkrtc lesion of his right elbow.      LABS:                        13.9   11.31 )-----------( 268      ( 04 May 2018 06:55 )             44.8     Na(134)/K(4.3)/Cl(99)/HCO3(20)/BUN(39)/Cr(2.08)Glu(89)/Ca(9.2)/Mg(--)/PO4(--)     @ 06:55  Na(135)/K(4.4)/Cl(98)/HCO3(24)/BUN(39)/Cr(2.13)Glu(125)/Ca(9.5)/Mg(1.9)/PO4(--)     @ 20:40  Na(135)/K(4.5)/Cl(99)/HCO3(20)/BUN(32)/Cr(2.08)Glu(102)/Ca(9.3)/Mg(--)/PO4(--)     @ 04:30    Uric acid 5.9    Urinalysis Basic - ( 04 May 2018 06:23 )  Color: YELLOW / Appearance: CLEAR / S.014 / pH: 6.5  Gluc: NEGATIVE / Ketone: NEGATIVE  / Bili: NEGATIVE / Urobili: NORMAL mg/dL   Blood: NEGATIVE / Protein: NEGATIVE mg/dL / Nitrite: NEGATIVE   Leuk Esterase: NEGATIVE / RBC: 5-10 / WBC 0-2   Sq Epi: OCC / Non Sq Epi: x / Bacteria: x  Sodium, Random Urine: 60 mmol/L (05-04 @ 07:00)  Potassium, Random Urine: 37.7 mmol/L ( @ 07:00)  Chloride, Random Urine: 24 mmol/L ( @ 07:00)  Creatinine, Random Urine: 126.35 mg/dL (05- @ 07:00)    IMPRESSION: 62M w/ HTN, HLD, gout, and CAD-CABG, 5/3/18 a/w CP/SOB    (1)Renal - Likely CKD3-4 (GFR 25-35ml/min); nonproteinuric; unclear exact etiology.   (2)Neuro - hydrocephalus?  (3)Gout - uric acid highly acceptable, on high-dose Uloric  (4)Chest pain - indicated for ischemia workup/may need cath. No renal objection to proceeding; it appears that his renal function is at baseline.      RECOMMEND:  (1)Follow up renal ultrasound  (2)Workup of hydrocephalus per Medicine/Neuro - no objection to Gadolinium if MRI is planned; favor avoidance of CT I+ of head  (3)Ischemic workup per Cardiology; if cardiac cath is needed, would give NS 250cc bolus pre cath and 125cc/h x 4 hours post cath   (4)Dose new meds for GFR 25-35ml/min          Jono Andino MD  [facility],   [phone] No pain, no shortness of breath      VITAL:  T(C): , Max: 36.8 (18 @ 12:43)  T(F): , Max: 98.3 (18 @ 12:43)  HR: 83 (18 @ 05:15)  BP: 118/76 (18 @ 05:15)  RR: 16 (18 @ 05:15)  SpO2: 100% (18 @ 05:15)      PHYSICAL EXAM:  Constitutional: NAD, Alert; frail  HEENT: NCAT, MMM; (+)poor dentition  Neck: Supple, No JVD  Respiratory: CTA-b/l  Cardiovascular: RRR s1s2, no m/r/g  Gastrointestinal: BS+, soft, NT/ND  Extremities: No peripheral edema b/l  Neurological: no focal deficits; strength grossly intact  Psychiatric: Normal mood, normal affect  Back: no CVAT b/l  Skin: No rashes, no nevi (+)large tophi, including a lphg-gupv-uuory lesion of his right elbow.      LABS:                        13.9   11.31 )-----------( 268      ( 04 May 2018 06:55 )             44.8     Na(134)/K(4.3)/Cl(99)/HCO3(20)/BUN(39)/Cr(2.08)Glu(89)/Ca(9.2)/Mg(--)/PO4(--)     @ 06:55  Na(135)/K(4.4)/Cl(98)/HCO3(24)/BUN(39)/Cr(2.13)Glu(125)/Ca(9.5)/Mg(1.9)/PO4(--)     @ 20:40  Na(135)/K(4.5)/Cl(99)/HCO3(20)/BUN(32)/Cr(2.08)Glu(102)/Ca(9.3)/Mg(--)/PO4(--)     @ 04:30    Uric acid 5.9    Urinalysis Basic - ( 04 May 2018 06:23 )  Color: YELLOW / Appearance: CLEAR / S.014 / pH: 6.5  Gluc: NEGATIVE / Ketone: NEGATIVE  / Bili: NEGATIVE / Urobili: NORMAL mg/dL   Blood: NEGATIVE / Protein: NEGATIVE mg/dL / Nitrite: NEGATIVE   Leuk Esterase: NEGATIVE / RBC: 5-10 / WBC 0-2   Sq Epi: OCC / Non Sq Epi: x / Bacteria: x  Sodium, Random Urine: 60 mmol/L (05-04 @ 07:00)  Potassium, Random Urine: 37.7 mmol/L ( @ 07:00)  Chloride, Random Urine: 24 mmol/L ( @ 07:00)  Creatinine, Random Urine: 126.35 mg/dL (- @ 07:00)    IMPRESSION: 62M w/ HTN, HLD, gout, and CAD-CABG, 5/3/18 a/w CP/SOB    (1)Renal - Likely CKD3-4 (GFR 25-35ml/min); nonproteinuric; unclear exact etiology.   (2)Neuro - hydrocephalus?  (3)Gout - uric acid highly acceptable, on high-dose Uloric  (4)Chest pain - indicated for ischemia workup/may need cath. No renal objection to proceeding; it appears that his renal function is at baseline.      RECOMMEND:  (1)Follow up renal ultrasound  (2)Workup of hydrocephalus per Medicine/Neuro - no objection to Gadolinium if MRI is planned; favor avoidance of CT I+ of head  (3)Ischemic workup per Cardiology; if cardiac cath is needed, would give NS 250cc bolus pre cath and 125cc/h x 4 hours post cath   (4)No objection to low-dose ACEI as ordered  (5)Dose new meds for GFR 25-35ml/min      Jono Andino MD  [facility]  [phone] No pain, no shortness of breath      VITAL:  T(C): , Max: 36.8 (18 @ 12:43)  T(F): , Max: 98.3 (18 @ 12:43)  HR: 83 (18 @ 05:15)  BP: 118/76 (18 @ 05:15)  RR: 16 (18 @ 05:15)  SpO2: 100% (18 @ 05:15)      PHYSICAL EXAM:  Constitutional: NAD, Alert; frail  HEENT: NCAT, MMM; (+)poor dentition  Neck: Supple, No JVD  Respiratory: CTA-b/l  Cardiovascular: RRR s1s2, no m/r/g  Gastrointestinal: BS+, soft, NT/ND  Extremities: No peripheral edema b/l  Neurological: no focal deficits; strength grossly intact  Psychiatric: Normal mood, normal affect  Back: no CVAT b/l  Skin: No rashes, no nevi (+)large tophi, including a qlyv-vxtc-jmacu lesion of his right elbow.      LABS:                        13.9   11.31 )-----------( 268      ( 04 May 2018 06:55 )             44.8     Na(134)/K(4.3)/Cl(99)/HCO3(20)/BUN(39)/Cr(2.08)Glu(89)/Ca(9.2)/Mg(--)/PO4(--)     @ 06:55  Na(135)/K(4.4)/Cl(98)/HCO3(24)/BUN(39)/Cr(2.13)Glu(125)/Ca(9.5)/Mg(1.9)/PO4(--)     @ 20:40  Na(135)/K(4.5)/Cl(99)/HCO3(20)/BUN(32)/Cr(2.08)Glu(102)/Ca(9.3)/Mg(--)/PO4(--)     @ 04:30    Uric acid 5.9    Urinalysis Basic - ( 04 May 2018 06:23 )  Color: YELLOW / Appearance: CLEAR / S.014 / pH: 6.5  Gluc: NEGATIVE / Ketone: NEGATIVE  / Bili: NEGATIVE / Urobili: NORMAL mg/dL   Blood: NEGATIVE / Protein: NEGATIVE mg/dL / Nitrite: NEGATIVE   Leuk Esterase: NEGATIVE / RBC: 5-10 / WBC 0-2   Sq Epi: OCC / Non Sq Epi: x / Bacteria: x  Sodium, Random Urine: 60 mmol/L (05-04 @ 07:00)  Potassium, Random Urine: 37.7 mmol/L ( @ 07:00)  Chloride, Random Urine: 24 mmol/L ( @ 07:00)  Creatinine, Random Urine: 126.35 mg/dL (- @ 07:00)    IMPRESSION: 62M w/ HTN, HLD, gout, and CAD-CABG, 5/3/18 a/w CP/SOB    (1)Renal - Likely CKD3-4 (GFR 25-35ml/min); nonproteinuric; unclear exact etiology.   (2)Neuro - hydrocephalus?  (3)Gout - uric acid highly acceptable, on high-dose Uloric  (4)Chest pain - indicated for ischemia workup/may need cath. No renal objection to proceeding; it appears that his renal function is at baseline.      RECOMMEND:  (1)Follow up renal ultrasound  (2)Workup of hydrocephalus per Medicine/Neuro - no objection to Gadolinium if MRI is planned; favor avoidance of CT I+ of head  (3)Ischemic workup per Cardiology; if cardiac cath is needed, would give NS 250cc bolus pre cath and 125cc/h x 4 hours post cath   (4)No objection to low-dose ACEI as ordered; favor avoidance of diuretics as able, as diuretic usage would increase risk of further gout flares  (5)Dose new meds for GFR 25-35ml/min      Jono Andino MD  [facility]  [phone] No pain, no shortness of breath      VITAL:  T(C): , Max: 36.8 (18 @ 12:43)  T(F): , Max: 98.3 (18 @ 12:43)  HR: 83 (18 @ 05:15)  BP: 118/76 (18 @ 05:15)  RR: 16 (18 @ 05:15)  SpO2: 100% (18 @ 05:15)      PHYSICAL EXAM:  Constitutional: NAD, Alert; frail  HEENT: NCAT, MMM; (+)poor dentition  Neck: Supple, No JVD  Respiratory: CTA-b/l  Cardiovascular: RRR s1s2, no m/r/g  Gastrointestinal: BS+, soft, NT/ND  Extremities: No peripheral edema b/l  Neurological: no focal deficits; strength grossly intact  Psychiatric: Normal mood, normal affect  Back: no CVAT b/l  Skin: No rashes, no nevi (+)large tophi, including a jenx-jzrp-mnogl lesion of his right elbow.      LABS:                        13.9   11.31 )-----------( 268      ( 04 May 2018 06:55 )             44.8     Na(134)/K(4.3)/Cl(99)/HCO3(20)/BUN(39)/Cr(2.08)Glu(89)/Ca(9.2)/Mg(--)/PO4(--)     @ 06:55  Na(135)/K(4.4)/Cl(98)/HCO3(24)/BUN(39)/Cr(2.13)Glu(125)/Ca(9.5)/Mg(1.9)/PO4(--)     @ 20:40  Na(135)/K(4.5)/Cl(99)/HCO3(20)/BUN(32)/Cr(2.08)Glu(102)/Ca(9.3)/Mg(--)/PO4(--)     @ 04:30    Uric acid 5.9    Urinalysis Basic - ( 04 May 2018 06:23 )  Color: YELLOW / Appearance: CLEAR / S.014 / pH: 6.5  Gluc: NEGATIVE / Ketone: NEGATIVE  / Bili: NEGATIVE / Urobili: NORMAL mg/dL   Blood: NEGATIVE / Protein: NEGATIVE mg/dL / Nitrite: NEGATIVE   Leuk Esterase: NEGATIVE / RBC: 5-10 / WBC 0-2   Sq Epi: OCC / Non Sq Epi: x / Bacteria: x  Sodium, Random Urine: 60 mmol/L (05-04 @ 07:00)  Potassium, Random Urine: 37.7 mmol/L ( @ 07:00)  Chloride, Random Urine: 24 mmol/L ( @ 07:00)  Creatinine, Random Urine: 126.35 mg/dL (- @ 07:00)    IMPRESSION: 62M w/ HTN, HLD, gout, and CAD-CABG, 5/3/18 a/w CP/SOB    (1)Renal - Likely CKD3-4 (GFR 25-35ml/min); nonproteinuric; unclear exact etiology.   (2)Neuro - hydrocephalus?  (3)Gout - uric acid highly acceptable, on high-dose Uloric  (4)Chest pain - indicated for ischemia workup/may need cath. No renal objection to proceeding; it appears that his renal function is at baseline.      RECOMMEND:  (1)Follow up renal ultrasound  (2)Workup of hydrocephalus per Medicine/Neuro - no objection to Gadolinium if MRI is planned; favor avoidance of CT I+ of head  (3)Ischemic workup per Cardiology; if cardiac cath is needed, would give NS 250cc bolus pre cath and 125cc/h x 4 hours post cath   (4)No objection to low-dose ACEI as ordered; favor avoidance of diuretics as able, as diuretic usage would increase risk of further gout flares  (5)Dose new meds for GFR 25-35ml/min      Jono Andino MD  [facility]  [phone]

## 2018-05-04 NOTE — PROGRESS NOTE ADULT - SUBJECTIVE AND OBJECTIVE BOX
Patient is a 62y old  Male who presents with a chief complaint of     INTERVAL HPI/OVERNIGHT EVENTS:  T(C): 37.2 (18 @ 17:10), Max: 37.2 (18 @ 12:17)  HR: 70 (18 @ 17:10) (68 - 95)  BP: 104/60 (18 @ 17:10) (94/66 - 121/66)  RR: 18 (18 @ 17:10) (16 - 18)  SpO2: 98% (18 @ 17:10) (98% - 100%)  Wt(kg): --  I&O's Summary    03 May 2018 07:01  -  04 May 2018 07:00  --------------------------------------------------------  IN: 100 mL / OUT: 0 mL / NET: 100 mL    04 May 2018 07:01  -  04 May 2018 18:32  --------------------------------------------------------  IN: 120 mL / OUT: 300 mL / NET: -180 mL        LABS:                        13.9   11.31 )-----------( 268      ( 04 May 2018 06:55 )             44.8         134<L>  |  99  |  39<H>  ----------------------------<  89  4.3   |  20<L>  |  2.08<H>    Ca    9.2      04 May 2018 06:55  Mg     1.9     -    TPro  7.0  /  Alb  3.9  /  TBili  0.4  /  DBili  x   /  AST  16  /  ALT  11  /  AlkPhos  66  05-03      Urinalysis Basic - ( 04 May 2018 06:23 )    Color: YELLOW / Appearance: CLEAR / S.014 / pH: 6.5  Gluc: NEGATIVE / Ketone: NEGATIVE  / Bili: NEGATIVE / Urobili: NORMAL mg/dL   Blood: NEGATIVE / Protein: NEGATIVE mg/dL / Nitrite: NEGATIVE   Leuk Esterase: NEGATIVE / RBC: 5-10 / WBC 0-2   Sq Epi: OCC / Non Sq Epi: x / Bacteria: x      CAPILLARY BLOOD GLUCOSE            Urinalysis Basic - ( 04 May 2018 06:23 )    Color: YELLOW / Appearance: CLEAR / S.014 / pH: 6.5  Gluc: NEGATIVE / Ketone: NEGATIVE  / Bili: NEGATIVE / Urobili: NORMAL mg/dL   Blood: NEGATIVE / Protein: NEGATIVE mg/dL / Nitrite: NEGATIVE   Leuk Esterase: NEGATIVE / RBC: 5-10 / WBC 0-2   Sq Epi: OCC / Non Sq Epi: x / Bacteria: x        MEDICATIONS  (STANDING):  amLODIPine   Tablet 5 milliGRAM(s) Oral daily  aspirin enteric coated 81 milliGRAM(s) Oral daily  atorvastatin 80 milliGRAM(s) Oral at bedtime  clopidogrel Tablet 75 milliGRAM(s) Oral daily  febuxostat 80 milliGRAM(s) Oral daily  isosorbide   mononitrate ER Tablet (IMDUR) 90 milliGRAM(s) Oral daily  lisinopril 5 milliGRAM(s) Oral daily  metoprolol succinate ER 25 milliGRAM(s) Oral daily    MEDICATIONS  (PRN):          PHYSICAL EXAM:  GENERAL: NAD, well-groomed, well-developed  HEAD:  Atraumatic, Normocephalic  CHEST/LUNG: Clear to percussion bilaterally; No rales, rhonchi, wheezing, or rubs  HEART: Regular rate and rhythm; No murmurs, rubs, or gallops  ABDOMEN: Soft, Nontender, Nondistended; Bowel sounds present  EXTREMITIES:  2+ Peripheral Pulses, No clubbing, cyanosis, or edema  LYMPH: No lymphadenopathy noted  SKIN: No rashes or lesions    Care Discussed with Consultants/Other Providers [+ ] YES  [ ] NO

## 2018-05-04 NOTE — PROGRESS NOTE ADULT - SUBJECTIVE AND OBJECTIVE BOX
Subjective:   	 no chest pain   no shortness of breath       MEDICATIONS:  MEDICATIONS  (STANDING):  amLODIPine   Tablet 5 milliGRAM(s) Oral daily  aspirin enteric coated 81 milliGRAM(s) Oral daily  atorvastatin 80 milliGRAM(s) Oral at bedtime  clopidogrel Tablet 75 milliGRAM(s) Oral daily  febuxostat 80 milliGRAM(s) Oral daily  isosorbide   mononitrate ER Tablet (IMDUR) 90 milliGRAM(s) Oral daily  lisinopril 5 milliGRAM(s) Oral daily  metoprolol succinate ER 25 milliGRAM(s) Oral daily    MEDICATIONS  (PRN):      LABS:	 	    CARDIAC MARKERS:  CARDIAC MARKERS ( 03 May 2018 20:40 )  x     / < 0.06 ng/mL / 35 u/L / 1.88 ng/mL / x      CARDIAC MARKERS ( 03 May 2018 10:22 )  x     / < 0.06 ng/mL / 40 u/L / 2.36 ng/mL / x      CARDIAC MARKERS ( 03 May 2018 04:30 )  x     / < 0.06 ng/mL / x     / x     / x                                    13.9   11.31 )-----------( 268      ( 04 May 2018 06:55 )             44.8     05-04    134<L>  |  99  |  39<H>  ----------------------------<  89  4.3   |  20<L>  |  2.08<H>    Ca    9.2      04 May 2018 06:55  Mg     1.9     05-03    TPro  7.0  /  Alb  3.9  /  TBili  0.4  /  DBili  x   /  AST  16  /  ALT  11  /  AlkPhos  66  05-03    COAGS:       proBNP:   Lipid Profile:   HgA1c:   TSH:       PHYSICAL EXAM:  T(C): 37.2 (05-04-18 @ 12:17), Max: 37.2 (05-04-18 @ 12:17)  HR: 69 (05-04-18 @ 12:17) (68 - 95)  BP: 107/62 (05-04-18 @ 12:17) (94/66 - 121/66)  RR: 18 (05-04-18 @ 12:17) (16 - 18)  SpO2: 100% (05-04-18 @ 12:17) (98% - 100%)  Wt(kg): --  I&O's Summary    03 May 2018 07:01  -  04 May 2018 07:00  --------------------------------------------------------  IN: 100 mL / OUT: 0 mL / NET: 100 mL    04 May 2018 07:01  -  04 May 2018 14:01  --------------------------------------------------------  IN: 0 mL / OUT: 300 mL / NET: -300 mL    Cardiovascular: Normal S1 S2, RRR   No JVD, 1/6 PATRICIA murmur,  Respiratory: Lungs clear to auscultation, normal effort 	  Gastrointestinal:  Soft, Non-tender, + BS	  Extremities no edema, cyanosis, clubbing B/L LE's    Peripheral pulses palpable 2+ bilaterally    TELEMETRY: 	 SR    ECG:  NSR 	  RADIOLOGY:   < from: CT Head No Cont (05.03.18 @ 12:44) >  IMPRESSION:    No evidence for calvarial fracture or acute intracranial hemorrhage. If   the patient has new and persistent symptoms, consider short interval   follow-up head CT or brain MRI follow-up if there are no MRI   contraindications.    Moderate dilatation of the lateral and third ventricles is noted as   described. Some considerations include chronic hydrocephalus, normal   pressure hydrocephalus, or central greater than cortical atrophy.    < end of copied text >    DIAGNOSTIC TESTING:  [ ] Echocardiogram:  [ ]  Catheterization:  [ ] Stress Test:    OTHER: 	      ASSESSMENT/PLAN: 	62y Male w/ hx of CAD, cabg 2015 PCI 2016 normal nuc 1 month ago at [facility] admitted with recurrent CP r/o for MI found with renal dysfunction of unknown chronicity    trops neg x 3   Renal input appreciated   renal US  CATh when  medically stable    noted CT head as above   F/U neuro recommendations

## 2018-05-05 LAB
BUN SERPL-MCNC: 37 MG/DL — HIGH (ref 7–23)
CALCIUM SERPL-MCNC: 9.3 MG/DL — SIGNIFICANT CHANGE UP (ref 8.4–10.5)
CHLORIDE SERPL-SCNC: 101 MMOL/L — SIGNIFICANT CHANGE UP (ref 98–107)
CK MB BLD-MCNC: 1.7 NG/ML — SIGNIFICANT CHANGE UP (ref 1–6.6)
CK SERPL-CCNC: 34 U/L — SIGNIFICANT CHANGE UP (ref 30–200)
CO2 SERPL-SCNC: 21 MMOL/L — LOW (ref 22–31)
CREAT SERPL-MCNC: 1.85 MG/DL — HIGH (ref 0.5–1.3)
GLUCOSE SERPL-MCNC: 94 MG/DL — SIGNIFICANT CHANGE UP (ref 70–99)
HCT VFR BLD CALC: 43.6 % — SIGNIFICANT CHANGE UP (ref 39–50)
HGB BLD-MCNC: 13.9 G/DL — SIGNIFICANT CHANGE UP (ref 13–17)
MAGNESIUM SERPL-MCNC: 1.7 MG/DL — SIGNIFICANT CHANGE UP (ref 1.6–2.6)
MCHC RBC-ENTMCNC: 27.9 PG — SIGNIFICANT CHANGE UP (ref 27–34)
MCHC RBC-ENTMCNC: 31.9 % — LOW (ref 32–36)
MCV RBC AUTO: 87.4 FL — SIGNIFICANT CHANGE UP (ref 80–100)
NRBC # FLD: 0 — SIGNIFICANT CHANGE UP
PLATELET # BLD AUTO: 251 K/UL — SIGNIFICANT CHANGE UP (ref 150–400)
PMV BLD: 9.8 FL — SIGNIFICANT CHANGE UP (ref 7–13)
POTASSIUM SERPL-MCNC: 4.4 MMOL/L — SIGNIFICANT CHANGE UP (ref 3.5–5.3)
POTASSIUM SERPL-SCNC: 4.4 MMOL/L — SIGNIFICANT CHANGE UP (ref 3.5–5.3)
RBC # BLD: 4.99 M/UL — SIGNIFICANT CHANGE UP (ref 4.2–5.8)
RBC # FLD: 14.5 % — SIGNIFICANT CHANGE UP (ref 10.3–14.5)
SODIUM SERPL-SCNC: 135 MMOL/L — SIGNIFICANT CHANGE UP (ref 135–145)
TROPONIN T SERPL-MCNC: < 0.06 NG/ML — SIGNIFICANT CHANGE UP (ref 0–0.06)
WBC # BLD: 11.64 K/UL — HIGH (ref 3.8–10.5)
WBC # FLD AUTO: 11.64 K/UL — HIGH (ref 3.8–10.5)

## 2018-05-05 PROCEDURE — 93010 ELECTROCARDIOGRAM REPORT: CPT

## 2018-05-05 RX ORDER — ACETAMINOPHEN 500 MG
650 TABLET ORAL EVERY 6 HOURS
Qty: 0 | Refills: 0 | Status: DISCONTINUED | OUTPATIENT
Start: 2018-05-05 | End: 2018-05-10

## 2018-05-05 RX ORDER — MAGNESIUM SULFATE 500 MG/ML
1 VIAL (ML) INJECTION ONCE
Qty: 0 | Refills: 0 | Status: COMPLETED | OUTPATIENT
Start: 2018-05-05 | End: 2018-05-05

## 2018-05-05 RX ORDER — MORPHINE SULFATE 50 MG/1
1 CAPSULE, EXTENDED RELEASE ORAL ONCE
Qty: 0 | Refills: 0 | Status: DISCONTINUED | OUTPATIENT
Start: 2018-05-05 | End: 2018-05-05

## 2018-05-05 RX ADMIN — ATORVASTATIN CALCIUM 80 MILLIGRAM(S): 80 TABLET, FILM COATED ORAL at 20:46

## 2018-05-05 RX ADMIN — Medication 100 GRAM(S): at 17:29

## 2018-05-05 RX ADMIN — MORPHINE SULFATE 1 MILLIGRAM(S): 50 CAPSULE, EXTENDED RELEASE ORAL at 12:33

## 2018-05-05 RX ADMIN — Medication 81 MILLIGRAM(S): at 09:30

## 2018-05-05 RX ADMIN — ISOSORBIDE MONONITRATE 90 MILLIGRAM(S): 60 TABLET, EXTENDED RELEASE ORAL at 09:31

## 2018-05-05 RX ADMIN — FEBUXOSTAT 80 MILLIGRAM(S): 40 TABLET ORAL at 09:30

## 2018-05-05 RX ADMIN — Medication 25 MILLIGRAM(S): at 05:14

## 2018-05-05 RX ADMIN — Medication 650 MILLIGRAM(S): at 11:45

## 2018-05-05 RX ADMIN — AMLODIPINE BESYLATE 5 MILLIGRAM(S): 2.5 TABLET ORAL at 05:14

## 2018-05-05 RX ADMIN — CLOPIDOGREL BISULFATE 75 MILLIGRAM(S): 75 TABLET, FILM COATED ORAL at 09:30

## 2018-05-05 RX ADMIN — MORPHINE SULFATE 1 MILLIGRAM(S): 50 CAPSULE, EXTENDED RELEASE ORAL at 12:16

## 2018-05-05 RX ADMIN — LISINOPRIL 5 MILLIGRAM(S): 2.5 TABLET ORAL at 05:14

## 2018-05-05 RX ADMIN — Medication 650 MILLIGRAM(S): at 12:38

## 2018-05-05 NOTE — PROGRESS NOTE ADULT - SUBJECTIVE AND OBJECTIVE BOX
Patient seen and examined in bed; patient reports intermittent CP; No new events.  NAD.    REVIEW OF SYSTEMS:  As per HPI, otherwise 8 full 10 ROS were unremarkable    MEDICATIONS  (STANDING):  amLODIPine   Tablet 5 milliGRAM(s) Oral daily  aspirin enteric coated 81 milliGRAM(s) Oral daily  atorvastatin 80 milliGRAM(s) Oral at bedtime  clopidogrel Tablet 75 milliGRAM(s) Oral daily  febuxostat 80 milliGRAM(s) Oral daily  isosorbide   mononitrate ER Tablet (IMDUR) 90 milliGRAM(s) Oral daily  lisinopril 5 milliGRAM(s) Oral daily  metoprolol succinate ER 25 milliGRAM(s) Oral daily      VITAL:  T(C): , Max: 37.2 (18 @ 17:10)  T(F): , Max: 99 (18 @ 19:17)  HR: 69 (18 @ 12:37)  BP: 101/61 (18 @ 12:37)  BP(mean): --  RR: 15 (18 @ 12:37)  SpO2: 100% (18 @ 12:37)  Wt(kg): --    I and O's:     @ 07:01  -   @ 07:00  --------------------------------------------------------  IN: 320 mL / OUT: 300 mL / NET: 20 mL     @ 07:01  -   @ 16:35  --------------------------------------------------------  IN: 250 mL / OUT: 0 mL / NET: 250 mL          PHYSICAL EXAM:    Constitutional: NAD  HEENT: PERRLA, EOMI,  MMM  Neck: No LAD, No JVD  Respiratory: CTAB  Cardiovascular: S1 and S2  Gastrointestinal: BS+, soft, NT/ND  Extremities: No peripheral edema  Neurological: A/O x 3, no focal deficits  Psychiatric: Normal mood, normal affect  : No Barnes  Skin: No rashes  Access: Not applicable    LABS:                        13.9   11.64 )-----------( 251      ( 05 May 2018 06:57 )             43.6         135  |  101  |  37<H>  ----------------------------<  94  4.4   |  21<L>  |  1.85<H>    Ca    9.3      05 May 2018 06:57  Mg     1.7             Urine Studies:  Urinalysis Basic - ( 04 May 2018 06:23 )    Color: YELLOW / Appearance: CLEAR / S.014 / pH: 6.5  Gluc: NEGATIVE / Ketone: NEGATIVE  / Bili: NEGATIVE / Urobili: NORMAL mg/dL   Blood: NEGATIVE / Protein: NEGATIVE mg/dL / Nitrite: NEGATIVE   Leuk Esterase: NEGATIVE / RBC: 5-10 / WBC 0-2   Sq Epi: OCC / Non Sq Epi: x / Bacteria: x      Sodium, Random Urine: 60 mmol/L ( @ 07:00)  Potassium, Random Urine: 37.7 mmol/L ( @ 07:00)  Chloride, Random Urine: 24 mmol/L ( @ 07:00)  Creatinine, Random Urine: 126.35 mg/dL ( @ 07:00)      IMPRESSION: 62M w/ HTN, HLD, gout, and CAD-CABG, 5/3/18 a/w CP/SOB    (1)Renal - Likely CKD3-4 (GFR 25-35ml/min); nonproteinuric; unclear exact etiology; azotemia improving  (2)Neuro - hydrocephalus?  (3)Gout - uric acid highly acceptable, on high-dose Uloric  (4)Chest pain - indicated for ischemia workup/may need cath; no renal objection  (5)Hypomagnesemia:  likely nutritional;  mild    RECOMMEND:  (1)Follow up renal ultrasound-pending  (2)Workup of hydrocephalus per Medicine/Neuro - no objection to Gadolinium if MRI is planned; favor avoidance of CT I+ of head  (3)Ischemic workup per Cardiology; if cardiac cath is needed, would give NS 250cc bolus pre cath and 125cc/h x 4 hours post cath   (4)No objection to continue Lisinopril 5 mg PO daily  (5)Would prefer avoidance of diuretics as able, as diuretic usage would increase risk of further gout flares  (6)Dose new meds for GFR 25-35ml/min  (7)Magnesium sulfate 1 G IV x 1 dose now

## 2018-05-05 NOTE — PROGRESS NOTE ADULT - SUBJECTIVE AND OBJECTIVE BOX
Patient is a 62y old  Male who presents with a chief complaint of     INTERVAL HPI/OVERNIGHT EVENTS:  T(C): 36.9 (18 @ 11:43), Max: 36.9 (18 @ 11:43)  HR: 69 (18 @ 12:37) (69 - 85)  BP: 101/61 (18 @ 12:37) (101/61 - 116/79)  RR: 15 (18 @ 12:37) (15 - 18)  SpO2: 100% (18 @ 12:37) (99% - 100%)  Wt(kg): --  I&O's Summary    04 May 2018 07:01  -  05 May 2018 07:00  --------------------------------------------------------  IN: 320 mL / OUT: 300 mL / NET: 20 mL    05 May 2018 07:01  -  05 May 2018 19:22  --------------------------------------------------------  IN: 250 mL / OUT: 0 mL / NET: 250 mL        LABS:                        13.9   11.64 )-----------( 251      ( 05 May 2018 06:57 )             43.6         135  |  101  |  37<H>  ----------------------------<  94  4.4   |  21<L>  |  1.85<H>    Ca    9.3      05 May 2018 06:57  Mg     1.7             Urinalysis Basic - ( 04 May 2018 06:23 )    Color: YELLOW / Appearance: CLEAR / S.014 / pH: 6.5  Gluc: NEGATIVE / Ketone: NEGATIVE  / Bili: NEGATIVE / Urobili: NORMAL mg/dL   Blood: NEGATIVE / Protein: NEGATIVE mg/dL / Nitrite: NEGATIVE   Leuk Esterase: NEGATIVE / RBC: 5-10 / WBC 0-2   Sq Epi: OCC / Non Sq Epi: x / Bacteria: x      CAPILLARY BLOOD GLUCOSE            Urinalysis Basic - ( 04 May 2018 06:23 )    Color: YELLOW / Appearance: CLEAR / S.014 / pH: 6.5  Gluc: NEGATIVE / Ketone: NEGATIVE  / Bili: NEGATIVE / Urobili: NORMAL mg/dL   Blood: NEGATIVE / Protein: NEGATIVE mg/dL / Nitrite: NEGATIVE   Leuk Esterase: NEGATIVE / RBC: 5-10 / WBC 0-2   Sq Epi: OCC / Non Sq Epi: x / Bacteria: x        MEDICATIONS  (STANDING):  amLODIPine   Tablet 5 milliGRAM(s) Oral daily  aspirin enteric coated 81 milliGRAM(s) Oral daily  atorvastatin 80 milliGRAM(s) Oral at bedtime  clopidogrel Tablet 75 milliGRAM(s) Oral daily  febuxostat 80 milliGRAM(s) Oral daily  isosorbide   mononitrate ER Tablet (IMDUR) 90 milliGRAM(s) Oral daily  lisinopril 5 milliGRAM(s) Oral daily  metoprolol succinate ER 25 milliGRAM(s) Oral daily    MEDICATIONS  (PRN):  acetaminophen   Tablet. 650 milliGRAM(s) Oral every 6 hours PRN mild and moderated headache          PHYSICAL EXAM:  GENERAL: NAD, well-groomed, well-developed  HEAD:  Atraumatic, Normocephalic  CHEST/LUNG: Clear to percussion bilaterally; No rales, rhonchi, wheezing, or rubs  HEART: Regular rate and rhythm; No murmurs, rubs, or gallops  ABDOMEN: Soft, Nontender, Nondistended; Bowel sounds present  EXTREMITIES:  2+ Peripheral Pulses, No clubbing, cyanosis, or edema  LYMPH: No lymphadenopathy noted  SKIN: No rashes or lesions    Care Discussed with Consultants/Other Providers [ ] YES  [ ] NO

## 2018-05-05 NOTE — PROVIDER CONTACT NOTE (OTHER) - SITUATION
RN made aware by NP patient c/op headache, upon assessment patient states he is having "chest heaviness", 6/10, denies SOB

## 2018-05-05 NOTE — PROGRESS NOTE ADULT - ATTENDING COMMENTS
Patient seen and examined, agree with above assessment and plan as transcribed above.    - Plan for cath monday if ok with renal and neuro    Abhijeet Eisenberg MD, FACC

## 2018-05-05 NOTE — PROGRESS NOTE ADULT - SUBJECTIVE AND OBJECTIVE BOX
Subjective: patient seen this AM   	 denied chest pain or  shortness of breath                 c/o headache         Since seen Tele PA reports pt c/o chest pain         MEDICATIONS  (STANDING):  amLODIPine   Tablet 5 milliGRAM(s) Oral daily  aspirin enteric coated 81 milliGRAM(s) Oral daily  atorvastatin 80 milliGRAM(s) Oral at bedtime  clopidogrel Tablet 75 milliGRAM(s) Oral daily  febuxostat 80 milliGRAM(s) Oral daily  isosorbide   mononitrate ER Tablet (IMDUR) 90 milliGRAM(s) Oral daily  lisinopril 5 milliGRAM(s) Oral daily  metoprolol succinate ER 25 milliGRAM(s) Oral daily    MEDICATIONS  (PRN):  acetaminophen   Tablet. 650 milliGRAM(s) Oral every 6 hours PRN mild and moderated headache      LABS:                        13.9   11.64 )-----------( 251      ( 05 May 2018 06:57 )             43.6     Hemoglobin: 13.9 g/dL (05-05 @ 06:57)  Hemoglobin: 13.9 g/dL (05-04 @ 06:55)  Hemoglobin: 13.7 g/dL (05-03 @ 04:30)    05-05    135  |  101  |  37<H>  ----------------------------<  94  4.4   |  21<L>  |  1.85<H>    Ca    9.3      05 May 2018 06:57  Mg     1.7     05-05      Creatinine Trend: 1.85<--, 2.08<--, 2.13<--, 2.08<--     CARDIAC MARKERS ( 05 May 2018 12:27 )  x     / < 0.06 ng/mL / 34 u/L / 1.70 ng/mL / x      CARDIAC MARKERS ( 03 May 2018 20:40 )  x     / < 0.06 ng/mL / 35 u/L / 1.88 ng/mL / x      CARDIAC MARKERS ( 03 May 2018 10:22 )  x     / < 0.06 ng/mL / 40 u/L / 2.36 ng/mL / x      CARDIAC MARKERS ( 03 May 2018 04:30 )  x     / < 0.06 ng/mL / x     / x     / x            PHYSICAL EXAM  Vital Signs Last 24 Hrs  T(C): 36.9 (05 May 2018 11:43), Max: 37.2 (04 May 2018 17:10)  T(F): 98.5 (05 May 2018 11:43), Max: 99 (04 May 2018 19:17)  HR: 69 (05 May 2018 12:37) (69 - 88)  BP: 101/61 (05 May 2018 12:37) (98/62 - 116/79)  BP(mean): --  RR: 15 (05 May 2018 12:37) (15 - 18)  SpO2: 100% (05 May 2018 12:37) (98% - 100%)    Cardiovascular: Normal S1 S2, RRR   No JVD, 1/6 PATRICIA murmur,  Respiratory: Lungs clear to auscultation, normal effort 	  Gastrointestinal:  Soft, Non-tender, + BS	  Extremities no edema, cyanosis, clubbing B/L LE's    Peripheral pulses palpable 2+ bilaterally    TELEMETRY: 	 SR    ECG:  NSR 	  RADIOLOGY:   < from: CT Head No Cont (05.03.18 @ 12:44) >  IMPRESSION:    No evidence for calvarial fracture or acute intracranial hemorrhage. If   the patient has new and persistent symptoms, consider short interval   follow-up head CT or brain MRI follow-up if there are no MRI   contraindications.    Moderate dilatation of the lateral and third ventricles is noted as   described. Some considerations include chronic hydrocephalus, normal   pressure hydrocephalus, or central greater than cortical atrophy.    < end of copied text >    DIAGNOSTIC TESTING:  [ ] Echocardiogram:  [ ]  Catheterization:  [ ] Stress Test:    OTHER: 	      ASSESSMENT/PLAN: 	62y Male w/ hx of CAD, cabg 2015 PCI 2016 normal nuc 1 month ago at [facility] admitted with recurrent CP r/o for MI found with renal dysfunction of unknown chronicity    pt w/ c/o chest pain     check 12 lead EKG     check Ruslan--- noted  1st CE neg   c/w ASA Plavix   trops neg x 3 on admit   Renal input appreciated   renal US  CATH when  medically stable    noted CT head as above   F/U neuro recommendations

## 2018-05-06 LAB
APTT BLD: 24.2 SEC — LOW (ref 27.5–37.4)
BUN SERPL-MCNC: 41 MG/DL — HIGH (ref 7–23)
CALCIUM SERPL-MCNC: 9.5 MG/DL — SIGNIFICANT CHANGE UP (ref 8.4–10.5)
CHLORIDE SERPL-SCNC: 105 MMOL/L — SIGNIFICANT CHANGE UP (ref 98–107)
CK MB BLD-MCNC: 1.64 NG/ML — SIGNIFICANT CHANGE UP (ref 1–6.6)
CK SERPL-CCNC: 34 U/L — SIGNIFICANT CHANGE UP (ref 30–200)
CO2 SERPL-SCNC: 18 MMOL/L — LOW (ref 22–31)
CREAT SERPL-MCNC: 1.68 MG/DL — HIGH (ref 0.5–1.3)
FOLATE SERPL-MCNC: > 20 NG/ML — HIGH (ref 4.7–20)
GLUCOSE SERPL-MCNC: 93 MG/DL — SIGNIFICANT CHANGE UP (ref 70–99)
HCT VFR BLD CALC: 43.2 % — SIGNIFICANT CHANGE UP (ref 39–50)
HGB BLD-MCNC: 13.8 G/DL — SIGNIFICANT CHANGE UP (ref 13–17)
MAGNESIUM SERPL-MCNC: 1.9 MG/DL — SIGNIFICANT CHANGE UP (ref 1.6–2.6)
MCHC RBC-ENTMCNC: 28.1 PG — SIGNIFICANT CHANGE UP (ref 27–34)
MCHC RBC-ENTMCNC: 31.9 % — LOW (ref 32–36)
MCV RBC AUTO: 88 FL — SIGNIFICANT CHANGE UP (ref 80–100)
NRBC # FLD: 0 — SIGNIFICANT CHANGE UP
PLATELET # BLD AUTO: 223 K/UL — SIGNIFICANT CHANGE UP (ref 150–400)
PMV BLD: 10.3 FL — SIGNIFICANT CHANGE UP (ref 7–13)
POTASSIUM SERPL-MCNC: 4.8 MMOL/L — SIGNIFICANT CHANGE UP (ref 3.5–5.3)
POTASSIUM SERPL-SCNC: 4.8 MMOL/L — SIGNIFICANT CHANGE UP (ref 3.5–5.3)
RBC # BLD: 4.91 M/UL — SIGNIFICANT CHANGE UP (ref 4.2–5.8)
RBC # FLD: 14.6 % — HIGH (ref 10.3–14.5)
SODIUM SERPL-SCNC: 138 MMOL/L — SIGNIFICANT CHANGE UP (ref 135–145)
T PALLIDUM AB TITR SER: NEGATIVE — SIGNIFICANT CHANGE UP
TROPONIN T SERPL-MCNC: < 0.06 NG/ML — SIGNIFICANT CHANGE UP (ref 0–0.06)
TSH SERPL-MCNC: 3.23 UIU/ML — SIGNIFICANT CHANGE UP (ref 0.27–4.2)
VIT B12 SERPL-MCNC: 1746 PG/ML — HIGH (ref 200–900)
WBC # BLD: 10.08 K/UL — SIGNIFICANT CHANGE UP (ref 3.8–10.5)
WBC # FLD AUTO: 10.08 K/UL — SIGNIFICANT CHANGE UP (ref 3.8–10.5)

## 2018-05-06 PROCEDURE — 93010 ELECTROCARDIOGRAM REPORT: CPT

## 2018-05-06 RX ORDER — MORPHINE SULFATE 50 MG/1
2 CAPSULE, EXTENDED RELEASE ORAL ONCE
Qty: 0 | Refills: 0 | Status: DISCONTINUED | OUTPATIENT
Start: 2018-05-06 | End: 2018-05-06

## 2018-05-06 RX ADMIN — Medication 81 MILLIGRAM(S): at 10:27

## 2018-05-06 RX ADMIN — LISINOPRIL 5 MILLIGRAM(S): 2.5 TABLET ORAL at 05:08

## 2018-05-06 RX ADMIN — AMLODIPINE BESYLATE 5 MILLIGRAM(S): 2.5 TABLET ORAL at 05:08

## 2018-05-06 RX ADMIN — MORPHINE SULFATE 2 MILLIGRAM(S): 50 CAPSULE, EXTENDED RELEASE ORAL at 15:17

## 2018-05-06 RX ADMIN — MORPHINE SULFATE 2 MILLIGRAM(S): 50 CAPSULE, EXTENDED RELEASE ORAL at 15:01

## 2018-05-06 RX ADMIN — FEBUXOSTAT 80 MILLIGRAM(S): 40 TABLET ORAL at 10:27

## 2018-05-06 RX ADMIN — ATORVASTATIN CALCIUM 80 MILLIGRAM(S): 80 TABLET, FILM COATED ORAL at 22:15

## 2018-05-06 RX ADMIN — CLOPIDOGREL BISULFATE 75 MILLIGRAM(S): 75 TABLET, FILM COATED ORAL at 10:27

## 2018-05-06 RX ADMIN — ISOSORBIDE MONONITRATE 90 MILLIGRAM(S): 60 TABLET, EXTENDED RELEASE ORAL at 10:27

## 2018-05-06 RX ADMIN — Medication 25 MILLIGRAM(S): at 05:08

## 2018-05-06 NOTE — PROGRESS NOTE ADULT - SUBJECTIVE AND OBJECTIVE BOX
Patient is a 62y old  Male who presents with a chief complaint of     INTERVAL HPI/OVERNIGHT EVENTS:  T(C): 36.7 (05-06-18 @ 13:35), Max: 37.1 (05-05-18 @ 20:45)  HR: 77 (05-06-18 @ 15:17) (69 - 77)  BP: 101/61 (05-06-18 @ 15:17) (101/61 - 115/69)  RR: 16 (05-06-18 @ 14:41) (16 - 16)  SpO2: 100% (05-06-18 @ 14:41) (98% - 100%)  Wt(kg): --  I&O's Summary    05 May 2018 07:01  -  06 May 2018 07:00  --------------------------------------------------------  IN: 300 mL / OUT: 0 mL / NET: 300 mL    06 May 2018 07:01  -  06 May 2018 17:05  --------------------------------------------------------  IN: 360 mL / OUT: 0 mL / NET: 360 mL        LABS:                        13.8   10.08 )-----------( 223      ( 06 May 2018 06:15 )             43.2     05-06    138  |  105  |  41<H>  ----------------------------<  93  4.8   |  18<L>  |  1.68<H>    Ca    9.5      06 May 2018 06:15  Mg     1.9     05-06      PTT - ( 06 May 2018 15:09 )  PTT:24.2 SEC    CAPILLARY BLOOD GLUCOSE                MEDICATIONS  (STANDING):  amLODIPine   Tablet 5 milliGRAM(s) Oral daily  aspirin enteric coated 81 milliGRAM(s) Oral daily  atorvastatin 80 milliGRAM(s) Oral at bedtime  clopidogrel Tablet 75 milliGRAM(s) Oral daily  febuxostat 80 milliGRAM(s) Oral daily  isosorbide   mononitrate ER Tablet (IMDUR) 90 milliGRAM(s) Oral daily  lisinopril 5 milliGRAM(s) Oral daily  metoprolol succinate ER 25 milliGRAM(s) Oral daily    MEDICATIONS  (PRN):  acetaminophen   Tablet. 650 milliGRAM(s) Oral every 6 hours PRN mild and moderated headache          PHYSICAL EXAM:  GENERAL: NAD, well-groomed, well-developed  HEAD:  Atraumatic, Normocephalic  CHEST/LUNG: Clear to percussion bilaterally; No rales, rhonchi, wheezing, or rubs  HEART: Regular rate and rhythm; No murmurs, rubs, or gallops  ABDOMEN: Soft, Nontender, Nondistended; Bowel sounds present  EXTREMITIES:  2+ Peripheral Pulses, No clubbing, cyanosis, or edema  LYMPH: No lymphadenopathy noted  SKIN: No rashes or lesions    Care Discussed with Consultants/Other Providers [ +] YES  [ ] NO

## 2018-05-06 NOTE — PROVIDER CONTACT NOTE (OTHER) - ASSESSMENT
VSS
Patient describes chest pain as "burning and heavy", non radiating, midsternal 8/10 chest pain. VS noted in flowsheet.

## 2018-05-06 NOTE — PROVIDER CONTACT NOTE (OTHER) - RECOMMENDATIONS
12 lead done, tylenol given for headache, ORION Ly made aware to assess patient at bedside
VS taken, 12 lead done, requesting morphine again for pain relief

## 2018-05-06 NOTE — PROGRESS NOTE ADULT - ATTENDING COMMENTS
Patient seen and examined, agree with above assessment and plan as transcribed above.    - cath 5/6 if ok with renal and neuro    Abhijeet Eisenberg MD, FACC

## 2018-05-06 NOTE — PROGRESS NOTE ADULT - SUBJECTIVE AND OBJECTIVE BOX
Subjective: patient  denied chest pain or  shortness of breath               MEDICATIONS  (STANDING):  amLODIPine   Tablet 5 milliGRAM(s) Oral daily  aspirin enteric coated 81 milliGRAM(s) Oral daily  atorvastatin 80 milliGRAM(s) Oral at bedtime  clopidogrel Tablet 75 milliGRAM(s) Oral daily  febuxostat 80 milliGRAM(s) Oral daily  isosorbide   mononitrate ER Tablet (IMDUR) 90 milliGRAM(s) Oral daily  lisinopril 5 milliGRAM(s) Oral daily  metoprolol succinate ER 25 milliGRAM(s) Oral daily    MEDICATIONS  (PRN):  acetaminophen   Tablet. 650 milliGRAM(s) Oral every 6 hours PRN mild and moderated headache      LABS:                        13.8   10.08 )-----------( 223      ( 06 May 2018 06:15 )             43.2     Hemoglobin: 13.8 g/dL (05-06 @ 06:15)  Hemoglobin: 13.9 g/dL (05-05 @ 06:57)  Hemoglobin: 13.9 g/dL (05-04 @ 06:55)  Hemoglobin: 13.7 g/dL (05-03 @ 04:30)    05-06    138  |  105  |  41<H>  ----------------------------<  93  4.8   |  18<L>  |  1.68<H>    Ca    9.5      06 May 2018 06:15  Mg     1.9     05-06      Creatinine Trend: 1.68<--, 1.85<--, 2.08<--, 2.13<--, 2.08<--     CARDIAC MARKERS ( 05 May 2018 12:27 )  x     / < 0.06 ng/mL / 34 u/L / 1.70 ng/mL / x      CARDIAC MARKERS ( 03 May 2018 20:40 )  x     / < 0.06 ng/mL / 35 u/L / 1.88 ng/mL / x            PHYSICAL EXAM  Vital Signs Last 24 Hrs  T(C): 36.9 (06 May 2018 10:26), Max: 37.1 (05 May 2018 20:45)  T(F): 98.5 (06 May 2018 10:26), Max: 98.8 (05 May 2018 20:45)  HR: 69 (06 May 2018 10:26) (69 - 85)  BP: 115/69 (06 May 2018 10:26) (101/61 - 115/69)  BP(mean): --  RR: 16 (06 May 2018 10:26) (15 - 16)  SpO2: 100% (06 May 2018 10:26) (100% - 100%)    TELEMETRY: 	 SR    ECG:  NSR 	  RADIOLOGY:   < from: CT Head No Cont (05.03.18 @ 12:44) >  IMPRESSION:    No evidence for calvarial fracture or acute intracranial hemorrhage. If   the patient has new and persistent symptoms, consider short interval   follow-up head CT or brain MRI follow-up if there are no MRI   contraindications.    Moderate dilatation of the lateral and third ventricles is noted as   described. Some considerations include chronic hydrocephalus, normal   pressure hydrocephalus, or central greater than cortical atrophy.    < end of copied text >    DIAGNOSTIC TESTING:  [ ] Echocardiogram:  [ ]  Catheterization:  [ ] Stress Test:    OTHER: 	      ASSESSMENT/PLAN: 	62y Male w/ hx of CAD, cabg 2015 PCI 2016 normal nuc 1 month ago at [facility] admitted with recurrent CP r/o for MI found with renal dysfunction of unknown chronicity    intermittent chest pain last 5/5  CE neg 5/5  c/w ASA Plavix   trops neg x 3 on admit   Renal input appreciated   renal US   recommended IVF pre & post CATH [250 bolus per & 125 cc/hr x 4 hrs post]  CATH possibly in AM if cleared by renal & neuro    noted CT head as above   F/U neuro recommendations

## 2018-05-06 NOTE — PROGRESS NOTE ADULT - SUBJECTIVE AND OBJECTIVE BOX
Patient seen and examined in bed; no new events.  NAD.  Denies CP.    REVIEW OF SYSTEMS:  As per HPI, otherwise 8 full 10 ROS were unremarkable    MEDICATIONS  (STANDING):  amLODIPine   Tablet 5 milliGRAM(s) Oral daily  aspirin enteric coated 81 milliGRAM(s) Oral daily  atorvastatin 80 milliGRAM(s) Oral at bedtime  clopidogrel Tablet 75 milliGRAM(s) Oral daily  febuxostat 80 milliGRAM(s) Oral daily  isosorbide   mononitrate ER Tablet (IMDUR) 90 milliGRAM(s) Oral daily  lisinopril 5 milliGRAM(s) Oral daily  metoprolol succinate ER 25 milliGRAM(s) Oral daily      VITAL:  T(C): , Max: 37.1 (05-05-18 @ 20:45)  T(F): , Max: 98.8 (05-05-18 @ 20:45)  HR: 71 (05-06-18 @ 13:35)  BP: 108/65 (05-06-18 @ 13:35)  BP(mean): --  RR: 16 (05-06-18 @ 13:35)  SpO2: 98% (05-06-18 @ 13:35)  Wt(kg): --    I and O's:    05-05 @ 07:01  -  05-06 @ 07:00  --------------------------------------------------------  IN: 300 mL / OUT: 0 mL / NET: 300 mL          PHYSICAL EXAM:    Constitutional: NAD  HEENT: PERRLA, EOMI,  MMM  Neck: No LAD, No JVD  Respiratory: CTAB  Cardiovascular: S1 and S2  Gastrointestinal: BS+, soft, NT/ND  Extremities: No peripheral edema  Neurological: A/O x 3, no focal deficits  Psychiatric: Normal mood, normal affect  : No Barnes  Skin: No rashes  Access: Not applicable    LABS:                        13.8   10.08 )-----------( 223      ( 06 May 2018 06:15 )             43.2     05-06    138  |  105  |  41<H>  ----------------------------<  93  4.8   |  18<L>  |  1.68<H>    Ca    9.5      06 May 2018 06:15  Mg     1.9     05-06    US Renal (05.04.18 @ 17:53) >  EXAM:  US KIDNEY(S)      PROCEDURE DATE:  May  4 2018     INTERPRETATION:  CLINICAL INFORMATION: Renal insufficiency, likely   chronic. No prior lab work is available. Hypertension. History of   coronary artery disease and hyperlipidemia.    COMPARISON: None available.    TECHNIQUE: Sonography of the kidneys and bladder.     FINDINGS:    Right kidney:  9.7 x 5.1 x 4.2 cm. No hydronephrosis or calculi. The   right kidney is echogenic. There are multiple anechoic lesions without   definite septation, nodularity, calcification, or flow which are most   compatible with cysts. The largest lesion in the midpole measures 8 x 9 x   11 mm.    Left kidney:  10.0 x 4.3 x 4 point cm. No hydronephrosis or calculi. The   left kidney is echogenic. There are multiple anechoic lesions without   septation, nodularity, calcification or flow which are most compatible   with cysts. The largest lesion is in the upper pole and measures 2.2 x   1.6 x 2.0 cm.    Urinary bladder: The bladder is incompletely distended.    IMPRESSION:     1.  Echogenic kidneys can occur in the setting of medical renal disease.  2.  No collecting system dilatation.  3.  Bilateral renal cysts.    IMPRESSION: 62M w/ HTN, HLD, gout, and CAD-CABG, 5/3/18 a/w CP/SOB    (1)Renal - Likely CKD3-4 (GFR 25-35ml/min); nonproteinuric; unclear exact etiology; no obstructive presentation noted on recent renal ultrasound; renal fxn continues to improve  (2)Neuro - hydrocephalus?  (3)Gout - uric acid highly acceptable, on high-dose Uloric  (4)Chest pain - indicated for ischemia workup/may need cath; no renal objection; ?tomorrow  (5)Hypomagnesemia:  likely nutritional;  improved    RECOMMEND:  (1)BMP daily  (2)Workup of hydrocephalus per Medicine/Neuro - no objection to Gadolinium if MRI is planned; favor avoidance of CT I+ of head  (3)Ischemic workup per Cardiology; if cardiac cath is needed; ?tomorrow;  would give NS 250cc bolus pre cath and 125cc/h x 4 hours post cath; awaiting clearance from Neuro; no renal objection to proceed  (4)No objection to continue Lisinopril 5 mg PO daily  (5)Would prefer avoidance of diuretics as able, as diuretic usage would increase risk of further gout flares  (6)Dose new meds for GFR 25-35ml/min Patient seen and examined in bed; no new events.  NAD.  Patient with active complaints of CP.    REVIEW OF SYSTEMS:  As per HPI, otherwise 8 full 10 ROS were unremarkable    MEDICATIONS  (STANDING):  amLODIPine   Tablet 5 milliGRAM(s) Oral daily  aspirin enteric coated 81 milliGRAM(s) Oral daily  atorvastatin 80 milliGRAM(s) Oral at bedtime  clopidogrel Tablet 75 milliGRAM(s) Oral daily  febuxostat 80 milliGRAM(s) Oral daily  isosorbide   mononitrate ER Tablet (IMDUR) 90 milliGRAM(s) Oral daily  lisinopril 5 milliGRAM(s) Oral daily  metoprolol succinate ER 25 milliGRAM(s) Oral daily      VITAL:  T(C): , Max: 37.1 (05-05-18 @ 20:45)  T(F): , Max: 98.8 (05-05-18 @ 20:45)  HR: 71 (05-06-18 @ 13:35)  BP: 108/65 (05-06-18 @ 13:35)  BP(mean): --  RR: 16 (05-06-18 @ 13:35)  SpO2: 98% (05-06-18 @ 13:35)  Wt(kg): --    I and O's:    05-05 @ 07:01  -  05-06 @ 07:00  --------------------------------------------------------  IN: 300 mL / OUT: 0 mL / NET: 300 mL          PHYSICAL EXAM:    Constitutional: NAD  HEENT: PERRLA, EOMI,  MMM  Neck: No LAD, No JVD  Respiratory: CTAB  Cardiovascular: S1 and S2  Gastrointestinal: BS+, soft, NT/ND  Extremities: No peripheral edema  Neurological: A/O x 3, no focal deficits  Psychiatric: Normal mood, normal affect  : No Barnes  Skin: No rashes  Access: Not applicable    LABS:                        13.8   10.08 )-----------( 223      ( 06 May 2018 06:15 )             43.2     05-06    138  |  105  |  41<H>  ----------------------------<  93  4.8   |  18<L>  |  1.68<H>    Ca    9.5      06 May 2018 06:15  Mg     1.9     05-06    US Renal (05.04.18 @ 17:53) >  EXAM:  US KIDNEY(S)      PROCEDURE DATE:  May  4 2018     INTERPRETATION:  CLINICAL INFORMATION: Renal insufficiency, likely   chronic. No prior lab work is available. Hypertension. History of   coronary artery disease and hyperlipidemia.    COMPARISON: None available.    TECHNIQUE: Sonography of the kidneys and bladder.     FINDINGS:    Right kidney:  9.7 x 5.1 x 4.2 cm. No hydronephrosis or calculi. The   right kidney is echogenic. There are multiple anechoic lesions without   definite septation, nodularity, calcification, or flow which are most   compatible with cysts. The largest lesion in the midpole measures 8 x 9 x   11 mm.    Left kidney:  10.0 x 4.3 x 4 point cm. No hydronephrosis or calculi. The   left kidney is echogenic. There are multiple anechoic lesions without   septation, nodularity, calcification or flow which are most compatible   with cysts. The largest lesion is in the upper pole and measures 2.2 x   1.6 x 2.0 cm.    Urinary bladder: The bladder is incompletely distended.    IMPRESSION:     1.  Echogenic kidneys can occur in the setting of medical renal disease.  2.  No collecting system dilatation.  3.  Bilateral renal cysts.    IMPRESSION: 62M w/ HTN, HLD, gout, and CAD-CABG, 5/3/18 a/w CP/SOB    (1)Renal - Likely CKD3-4 (GFR 25-35ml/min); nonproteinuric; unclear exact etiology; no obstructive presentation noted on recent renal ultrasound; renal fxn continues to improve  (2)Neuro - hydrocephalus?  (3)Gout - uric acid highly acceptable, on high-dose Uloric  (4)Chest pain - indicated for ischemia workup/may need cath; no renal objection; ?tomorrow  (5)Hypomagnesemia:  likely nutritional;  improved    RECOMMEND:  (1)BMP daily  (2)Workup of hydrocephalus per Medicine/Neuro - no objection to Gadolinium if MRI is planned; favor avoidance of CT I+ of head  (3)Ischemic workup per Cardiology; if cardiac cath is needed; ?tomorrow;  would give NS 250cc bolus pre cath and 125cc/h x 4 hours post cath; awaiting clearance from Neuro; no renal objection to proceed  (4)Would prefer avoidance of diuretics as able, as diuretic usage would increase risk of further gout flares  (5)Dose new meds for GFR 25-35ml/min Patient seen and examined in bed; no new events.  NAD.  Patient with active complaints of CP.    REVIEW OF SYSTEMS:  As per HPI, otherwise 8 full 10 ROS were unremarkable    MEDICATIONS  (STANDING):  amLODIPine   Tablet 5 milliGRAM(s) Oral daily  aspirin enteric coated 81 milliGRAM(s) Oral daily  atorvastatin 80 milliGRAM(s) Oral at bedtime  clopidogrel Tablet 75 milliGRAM(s) Oral daily  febuxostat 80 milliGRAM(s) Oral daily  isosorbide   mononitrate ER Tablet (IMDUR) 90 milliGRAM(s) Oral daily  lisinopril 5 milliGRAM(s) Oral daily  metoprolol succinate ER 25 milliGRAM(s) Oral daily      VITAL:  T(C): , Max: 37.1 (05-05-18 @ 20:45)  T(F): , Max: 98.8 (05-05-18 @ 20:45)  HR: 71 (05-06-18 @ 13:35)  BP: 108/65 (05-06-18 @ 13:35)  BP(mean): --  RR: 16 (05-06-18 @ 13:35)  SpO2: 98% (05-06-18 @ 13:35)  Wt(kg): --    I and O's:    05-05 @ 07:01  -  05-06 @ 07:00  --------------------------------------------------------  IN: 300 mL / OUT: 0 mL / NET: 300 mL          PHYSICAL EXAM:    Constitutional: NAD  HEENT: PERRLA, EOMI,  MMM  Neck: No LAD, No JVD  Respiratory: CTAB  Cardiovascular: S1 and S2  Gastrointestinal: BS+, soft, NT/ND  Extremities: No peripheral edema  Neurological: A/O x 3, no focal deficits  Psychiatric: Normal mood, normal affect  : No Barnes  Skin: No rashes  Access: Not applicable    LABS:                        13.8   10.08 )-----------( 223      ( 06 May 2018 06:15 )             43.2     05-06    138  |  105  |  41<H>  ----------------------------<  93  4.8   |  18<L>  |  1.68<H>    Ca    9.5      06 May 2018 06:15  Mg     1.9     05-06    US Renal (05.04.18 @ 17:53) >  EXAM:  US KIDNEY(S)      PROCEDURE DATE:  May  4 2018     INTERPRETATION:  CLINICAL INFORMATION: Renal insufficiency, likely   chronic. No prior lab work is available. Hypertension. History of   coronary artery disease and hyperlipidemia.    COMPARISON: None available.    TECHNIQUE: Sonography of the kidneys and bladder.     FINDINGS:    Right kidney:  9.7 x 5.1 x 4.2 cm. No hydronephrosis or calculi. The   right kidney is echogenic. There are multiple anechoic lesions without   definite septation, nodularity, calcification, or flow which are most   compatible with cysts. The largest lesion in the midpole measures 8 x 9 x   11 mm.    Left kidney:  10.0 x 4.3 x 4 point cm. No hydronephrosis or calculi. The   left kidney is echogenic. There are multiple anechoic lesions without   septation, nodularity, calcification or flow which are most compatible   with cysts. The largest lesion is in the upper pole and measures 2.2 x   1.6 x 2.0 cm.    Urinary bladder: The bladder is incompletely distended.    IMPRESSION:     1.  Echogenic kidneys can occur in the setting of medical renal disease.  2.  No collecting system dilatation.  3.  Bilateral renal cysts.    IMPRESSION: 62M w/ HTN, HLD, gout, and CAD-CABG, 5/3/18 a/w CP/SOB    (1)Renal - Likely CKD3-4 (GFR 25-35ml/min); nonproteinuric; unclear exact etiology; no obstructive presentation noted on recent renal ultrasound; renal fxn continues to improve  (2)Neuro - hydrocephalus?  (3)Gout - uric acid highly acceptable, on high-dose Uloric  (4)Chest pain - indicated for ischemia workup/may need cath; no renal objection; ?tomorrow  (5)Hypomagnesemia:  likely nutritional;  improved    RECOMMEND:  (1)BMP daily  (2)Workup of hydrocephalus per Medicine/Neuro - no objection to Gadolinium if MRI is planned; favor avoidance of CT I+ of head  (3)Ischemic workup per Cardiology; if cardiac cath is needed; ?tomorrow;  would give NS 250cc bolus pre cath and 125cc/h x 4 hours post cath; awaiting clearance from Neuro; no renal objection to proceed  (4)Would look to hold Lisinopril in AM if cath scheduled; may resume post cath  (5)Would prefer avoidance of diuretics as able, as diuretic usage would increase risk of further gout flares  (6)Dose new meds for GFR 25-35ml/min

## 2018-05-07 LAB
BUN SERPL-MCNC: 35 MG/DL — HIGH (ref 7–23)
CALCIUM SERPL-MCNC: 9.3 MG/DL — SIGNIFICANT CHANGE UP (ref 8.4–10.5)
CHLORIDE SERPL-SCNC: 105 MMOL/L — SIGNIFICANT CHANGE UP (ref 98–107)
CK MB BLD-MCNC: 1.81 NG/ML — SIGNIFICANT CHANGE UP (ref 1–6.6)
CK MB BLD-MCNC: SIGNIFICANT CHANGE UP (ref 0–2.5)
CK SERPL-CCNC: 59 U/L — SIGNIFICANT CHANGE UP (ref 30–200)
CO2 SERPL-SCNC: 15 MMOL/L — LOW (ref 22–31)
CREAT SERPL-MCNC: 1.55 MG/DL — HIGH (ref 0.5–1.3)
GLUCOSE SERPL-MCNC: 91 MG/DL — SIGNIFICANT CHANGE UP (ref 70–99)
HCT VFR BLD CALC: 46.7 % — SIGNIFICANT CHANGE UP (ref 39–50)
HGB BLD-MCNC: 14.3 G/DL — SIGNIFICANT CHANGE UP (ref 13–17)
MAGNESIUM SERPL-MCNC: 1.7 MG/DL — SIGNIFICANT CHANGE UP (ref 1.6–2.6)
MCHC RBC-ENTMCNC: 27.4 PG — SIGNIFICANT CHANGE UP (ref 27–34)
MCHC RBC-ENTMCNC: 30.6 % — LOW (ref 32–36)
MCV RBC AUTO: 89.5 FL — SIGNIFICANT CHANGE UP (ref 80–100)
NRBC # FLD: 0 — SIGNIFICANT CHANGE UP
PLATELET # BLD AUTO: 231 K/UL — SIGNIFICANT CHANGE UP (ref 150–400)
PMV BLD: 10.2 FL — SIGNIFICANT CHANGE UP (ref 7–13)
POTASSIUM SERPL-MCNC: 4.6 MMOL/L — SIGNIFICANT CHANGE UP (ref 3.5–5.3)
POTASSIUM SERPL-SCNC: 4.6 MMOL/L — SIGNIFICANT CHANGE UP (ref 3.5–5.3)
RBC # BLD: 5.22 M/UL — SIGNIFICANT CHANGE UP (ref 4.2–5.8)
RBC # FLD: 14.6 % — HIGH (ref 10.3–14.5)
SODIUM SERPL-SCNC: 137 MMOL/L — SIGNIFICANT CHANGE UP (ref 135–145)
TROPONIN T SERPL-MCNC: < 0.06 NG/ML — SIGNIFICANT CHANGE UP (ref 0–0.06)
WBC # BLD: 12.65 K/UL — HIGH (ref 3.8–10.5)
WBC # FLD AUTO: 12.65 K/UL — HIGH (ref 3.8–10.5)

## 2018-05-07 PROCEDURE — 93010 ELECTROCARDIOGRAM REPORT: CPT

## 2018-05-07 PROCEDURE — 99152 MOD SED SAME PHYS/QHP 5/>YRS: CPT

## 2018-05-07 PROCEDURE — 93458 L HRT ARTERY/VENTRICLE ANGIO: CPT | Mod: 26

## 2018-05-07 RX ORDER — MAGNESIUM OXIDE 400 MG ORAL TABLET 241.3 MG
400 TABLET ORAL
Qty: 0 | Refills: 0 | Status: COMPLETED | OUTPATIENT
Start: 2018-05-07 | End: 2018-05-07

## 2018-05-07 RX ORDER — SODIUM CHLORIDE 9 MG/ML
500 INJECTION INTRAMUSCULAR; INTRAVENOUS; SUBCUTANEOUS
Qty: 0 | Refills: 0 | Status: DISCONTINUED | OUTPATIENT
Start: 2018-05-07 | End: 2018-05-07

## 2018-05-07 RX ORDER — SODIUM CHLORIDE 9 MG/ML
250 INJECTION INTRAMUSCULAR; INTRAVENOUS; SUBCUTANEOUS ONCE
Qty: 0 | Refills: 0 | Status: COMPLETED | OUTPATIENT
Start: 2018-05-07 | End: 2018-05-07

## 2018-05-07 RX ORDER — MORPHINE SULFATE 50 MG/1
1 CAPSULE, EXTENDED RELEASE ORAL ONCE
Qty: 0 | Refills: 0 | Status: DISCONTINUED | OUTPATIENT
Start: 2018-05-07 | End: 2018-05-07

## 2018-05-07 RX ADMIN — Medication 650 MILLIGRAM(S): at 18:49

## 2018-05-07 RX ADMIN — FEBUXOSTAT 80 MILLIGRAM(S): 40 TABLET ORAL at 13:45

## 2018-05-07 RX ADMIN — Medication 81 MILLIGRAM(S): at 13:13

## 2018-05-07 RX ADMIN — ISOSORBIDE MONONITRATE 90 MILLIGRAM(S): 60 TABLET, EXTENDED RELEASE ORAL at 13:45

## 2018-05-07 RX ADMIN — Medication 650 MILLIGRAM(S): at 10:45

## 2018-05-07 RX ADMIN — Medication 25 MILLIGRAM(S): at 06:21

## 2018-05-07 RX ADMIN — SODIUM CHLORIDE 250 MILLILITER(S): 9 INJECTION INTRAMUSCULAR; INTRAVENOUS; SUBCUTANEOUS at 13:55

## 2018-05-07 RX ADMIN — MORPHINE SULFATE 1 MILLIGRAM(S): 50 CAPSULE, EXTENDED RELEASE ORAL at 13:56

## 2018-05-07 RX ADMIN — AMLODIPINE BESYLATE 5 MILLIGRAM(S): 2.5 TABLET ORAL at 06:21

## 2018-05-07 RX ADMIN — Medication 650 MILLIGRAM(S): at 19:45

## 2018-05-07 RX ADMIN — LISINOPRIL 5 MILLIGRAM(S): 2.5 TABLET ORAL at 06:21

## 2018-05-07 RX ADMIN — ATORVASTATIN CALCIUM 80 MILLIGRAM(S): 80 TABLET, FILM COATED ORAL at 21:36

## 2018-05-07 RX ADMIN — MAGNESIUM OXIDE 400 MG ORAL TABLET 400 MILLIGRAM(S): 241.3 TABLET ORAL at 10:38

## 2018-05-07 RX ADMIN — CLOPIDOGREL BISULFATE 75 MILLIGRAM(S): 75 TABLET, FILM COATED ORAL at 13:13

## 2018-05-07 RX ADMIN — MAGNESIUM OXIDE 400 MG ORAL TABLET 400 MILLIGRAM(S): 241.3 TABLET ORAL at 18:23

## 2018-05-07 RX ADMIN — Medication 650 MILLIGRAM(S): at 10:05

## 2018-05-07 RX ADMIN — MORPHINE SULFATE 1 MILLIGRAM(S): 50 CAPSULE, EXTENDED RELEASE ORAL at 13:44

## 2018-05-07 NOTE — PROGRESS NOTE ADULT - SUBJECTIVE AND OBJECTIVE BOX
Patient is a 62y old  Male who presents with a chief complaint of     INTERVAL HPI/OVERNIGHT EVENTS:  T(C): 36.6 (05-07-18 @ 13:37), Max: 36.8 (05-07-18 @ 03:03)  HR: 79 (05-07-18 @ 13:37) (77 - 88)  BP: 111/71 (05-07-18 @ 13:37) (101/61 - 127/88)  RR: 16 (05-07-18 @ 13:37) (16 - 18)  SpO2: 100% (05-07-18 @ 13:37) (99% - 100%)  Wt(kg): --  I&O's Summary    06 May 2018 07:01  -  07 May 2018 07:00  --------------------------------------------------------  IN: 840 mL / OUT: 350 mL / NET: 490 mL        LABS:                        14.3   12.65 )-----------( 231      ( 07 May 2018 06:44 )             46.7     05-07    137  |  105  |  35<H>  ----------------------------<  91  4.6   |  15<L>  |  1.55<H>    Ca    9.3      07 May 2018 06:44  Mg     1.7     05-07      PTT - ( 06 May 2018 15:09 )  PTT:24.2 SEC    CAPILLARY BLOOD GLUCOSE                MEDICATIONS  (STANDING):  amLODIPine   Tablet 5 milliGRAM(s) Oral daily  aspirin enteric coated 81 milliGRAM(s) Oral daily  atorvastatin 80 milliGRAM(s) Oral at bedtime  clopidogrel Tablet 75 milliGRAM(s) Oral daily  febuxostat 80 milliGRAM(s) Oral daily  isosorbide   mononitrate ER Tablet (IMDUR) 90 milliGRAM(s) Oral daily  lisinopril 5 milliGRAM(s) Oral daily  magnesium oxide 400 milliGRAM(s) Oral two times a day with meals  metoprolol succinate ER 25 milliGRAM(s) Oral daily  sodium chloride 0.9%. 500 milliLiter(s) (125 mL/Hr) IV Continuous <Continuous>    MEDICATIONS  (PRN):  acetaminophen   Tablet. 650 milliGRAM(s) Oral every 6 hours PRN mild and moderated headache          PHYSICAL EXAM:  GENERAL: NAD, well-groomed, well-developed  HEAD:  Atraumatic, Normocephalic  CHEST/LUNG: Clear to percussion bilaterally; No rales, rhonchi, wheezing, or rubs  HEART: Regular rate and rhythm; No murmurs, rubs, or gallops  ABDOMEN: Soft, Nontender, Nondistended; Bowel sounds present  EXTREMITIES:  2+ Peripheral Pulses, No clubbing, cyanosis, or edema  LYMPH: No lymphadenopathy noted  SKIN: No rashes or lesions    Care Discussed with Consultants/Other Providers [+ ] YES  [ ] NO

## 2018-05-07 NOTE — PROGRESS NOTE ADULT - SUBJECTIVE AND OBJECTIVE BOX
Subjective: c/o CP this AM, same as day prior, denies SOB, radiation of pain, or palps            MEDICATIONS  (STANDING):  amLODIPine   Tablet 5 milliGRAM(s) Oral daily  aspirin enteric coated 81 milliGRAM(s) Oral daily  atorvastatin 80 milliGRAM(s) Oral at bedtime  clopidogrel Tablet 75 milliGRAM(s) Oral daily  febuxostat 80 milliGRAM(s) Oral daily  isosorbide   mononitrate ER Tablet (IMDUR) 90 milliGRAM(s) Oral daily  lisinopril 5 milliGRAM(s) Oral daily  magnesium oxide 400 milliGRAM(s) Oral two times a day with meals  metoprolol succinate ER 25 milliGRAM(s) Oral daily  sodium chloride 0.9% Bolus 250 milliLiter(s) IV Bolus once  sodium chloride 0.9%. 500 milliLiter(s) (125 mL/Hr) IV Continuous <Continuous>    MEDICATIONS  (PRN):  acetaminophen   Tablet. 650 milliGRAM(s) Oral every 6 hours PRN mild and moderated headache    LABS:                        14.3   12.65 )-----------( 231      ( 07 May 2018 06:44 )             46.7    137  |  105  |  35<H>  ----------------------------<  91  4.6   |  15<L>  |  1.55<H>    Ca    9.3      07 May 2018 06:44  Mg     1.7     05-07    CARDIAC MARKERS ( 07 May 2018 06:44 )  x     / < 0.06 ng/mL / 59 u/L / 1.81 ng/mL / x      CARDIAC MARKERS ( 06 May 2018 15:09 )  x     / < 0.06 ng/mL / 34 u/L / 1.64 ng/mL / x      CARDIAC MARKERS ( 05 May 2018 12:27 )  x     / < 0.06 ng/mL / 34 u/L / 1.70 ng/mL / x          PHYSICAL EXAM:  Vital Signs Last 24 Hrs  T(C): 36.8 (07 May 2018 09:36), Max: 36.8 (07 May 2018 03:03)  T(F): 98.2 (07 May 2018 09:36), Max: 98.2 (07 May 2018 03:03)  HR: 78 (07 May 2018 09:36) (71 - 88)  BP: 113/76 (07 May 2018 09:36) (101/61 - 127/88)  RR: 18 (07 May 2018 09:36) (16 - 18)  SpO2: 100% (07 May 2018 09:36) (98% - 100%)    Cardiovascular:  S1S2 RRR, No JVD  Respiratory: Lungs clear to auscultation, normal effort  Gastrointestinal: Abdomen soft, ND, NT, +BS  Skin: Warm, dry, intact. No rash.  Musculoskeletal: Normal ROM, normal strength  Ext: No C/C/E B/L LE    DIAGNOSTIC DATA  TELEMETRY: NSR    < from: CT Head No Cont (05.03.18 @ 12:44) >  IMPRESSION:    No evidence for calvarial fracture or acute intracranial hemorrhage. If   the patient has new and persistent symptoms, consider short interval   follow-up head CT or brain MRI follow-up if there are no MRI   contraindications.    Moderate dilatation of the lateral and third ventricles is noted as   described. Some considerations include chronic hydrocephalus, normal   pressure hydrocephalus, or central greater than cortical atrophy.    < end of copied text >      ASSESSMENT/PLAN: 	62 year old Male w/ hx of CAD s/p CABG in 2015, s/p  PCI 2016, with a normal NST reported 1 month ago at [facility], who is admitted with recurrent CP    --ACS ruled out with serial CE  --ongoing intermitted episodes of CP  --Renal consult appreciated: recommended IVF pre & post CATH [250 bolus per & 125 cc/hr x 4 hrs post]  --s/p head CT with moderate dilation of lateral and third ventricles  --Neuro consult appreciated.  Head CT reviewed PT with likely congential hydrocephalus, [place], denies any complications during birth or during development. Needs OP f/u with a Neurologist  --[facility] today    Tiarra Dumont PA-C

## 2018-05-07 NOTE — PROGRESS NOTE ADULT - ATTENDING COMMENTS
Agree with above assessment and plan as outlined above.    - for cath today    Abhijeet Eisenberg MD, FACC

## 2018-05-07 NOTE — PROGRESS NOTE ADULT - SUBJECTIVE AND OBJECTIVE BOX
No pain, no shortness of breath      VITAL:  T(C): , Max: 36.9 (05-06-18 @ 10:26)  T(F): , Max: 98.5 (05-06-18 @ 10:26)  HR: 88 (05-07-18 @ 06:24)  BP: 127/88 (05-07-18 @ 06:24)  BP(mean): --  RR: 18 (05-07-18 @ 06:24)  SpO2: 99% (05-07-18 @ 06:24)      PHYSICAL EXAM:  Constitutional: NAD, Alert; frail  HEENT: NCAT, MMM; (+)poor dentition  Neck: Supple, No JVD  Respiratory: CTA-b/l  Cardiovascular: RRR s1s2, no m/r/g  Gastrointestinal: BS+, soft, NT/ND  Extremities: No peripheral edema b/l  Neurological: no focal deficits; strength grossly intact  Psychiatric: Normal mood, normal affect  Back: no CVAT b/l  Skin: No rashes, no nevi (+)large tophi, including a bimz-qutz-upzsa lesion of his right elbow.      LABS:                        14.3   12.65 )-----------( 231      ( 07 May 2018 06:44 )             46.7     Na(137)/K(4.6)/Cl(105)/HCO3(15)/BUN(35)/Cr(1.55)Glu(91)/Ca(9.3)/Mg(1.7)/PO4(--)    05-07 @ 06:44  Na(138)/K(4.8)/Cl(105)/HCO3(18)/BUN(41)/Cr(1.68)Glu(93)/Ca(9.5)/Mg(1.9)/PO4(--)    05-06 @ 06:15  Na(135)/K(4.4)/Cl(101)/HCO3(21)/BUN(37)/Cr(1.85)Glu(94)/Ca(9.3)/Mg(1.7)/PO4(--)    05-05 @ 06:57    IMAGING:  Renal Ultrasound 5/4/18  Right kidney:  9.7 x 5.1 x 4.2 cm. No hydronephrosis or calculi. The   right kidney is echogenic. There are multiple anechoic lesions without   definite septation, nodularity, calcification, or flow which are most   compatible with cysts. The largest lesion in the midpole measures 8 x 9 x   11 mm.  Left kidney:  10.0 x 4.3 x 4 point cm. No hydronephrosis or calculi. The   left kidney is echogenic. There are multiple anechoic lesions without   septation, nodularity, calcification or flow which are most compatible   with cysts. The largest lesion is in the upper pole and measures 2.2 x   1.6 x 2.0 cm.      IMPRESSION: 62M w/ HTN, HLD, gout, CKD3, and CAD-CABG, 5/3/18 a/w CP/SOB    (1)Renal - Nonproteinuric CKD3 of unclear exact etiology. Resolving mild superimposed hemodynamic JOURDAN  (2)Chest pain - indicated for ischemia workup/may need cath. No renal objection to proceeding; it appears that his renal function is at baseline.      RECOMMEND:  (1)No renal objection to cath; would give NS 250cc bolus pre cath and 125cc/h x 4 hours post cath   (2)No objection to low-dose ACEI as ordered; favor avoidance of diuretics as able, as diuretic usage would increase risk of further gout flares  (3)Dose new meds for GFR 30-40ml/min              Jono Andino MD  [facility]  [phone] No pain, no shortness of breath      VITAL:  T(C): , Max: 36.9 (05-06-18 @ 10:26)  T(F): , Max: 98.5 (05-06-18 @ 10:26)  HR: 88 (05-07-18 @ 06:24)  BP: 127/88 (05-07-18 @ 06:24)  BP(mean): --  RR: 18 (05-07-18 @ 06:24)  SpO2: 99% (05-07-18 @ 06:24)      PHYSICAL EXAM:  Constitutional: NAD, Alert; frail  HEENT: NCAT, MMM; (+)poor dentition  Neck: Supple, No JVD  Respiratory: CTA-b/l  Cardiovascular: RRR s1s2, no m/r/g  Gastrointestinal: BS+, soft, NT/ND  Extremities: No peripheral edema b/l  Neurological: no focal deficits; strength grossly intact  Psychiatric: Normal mood, normal affect  Back: no CVAT b/l  Skin: No rashes, no nevi (+)large tophi, including a ivdx-ukup-lfxkn lesion of his right elbow.      LABS:                        14.3   12.65 )-----------( 231      ( 07 May 2018 06:44 )             46.7     Na(137)/K(4.6)/Cl(105)/HCO3(15)/BUN(35)/Cr(1.55)Glu(91)/Ca(9.3)/Mg(1.7)/PO4(--)    05-07 @ 06:44  Na(138)/K(4.8)/Cl(105)/HCO3(18)/BUN(41)/Cr(1.68)Glu(93)/Ca(9.5)/Mg(1.9)/PO4(--)    05-06 @ 06:15  Na(135)/K(4.4)/Cl(101)/HCO3(21)/BUN(37)/Cr(1.85)Glu(94)/Ca(9.3)/Mg(1.7)/PO4(--)    05-05 @ 06:57    IMAGING:  Renal Ultrasound 5/4/18  Right kidney:  9.7 x 5.1 x 4.2 cm. No hydronephrosis or calculi. The   right kidney is echogenic. There are multiple anechoic lesions without   definite septation, nodularity, calcification, or flow which are most   compatible with cysts. The largest lesion in the midpole measures 8 x 9 x   11 mm.  Left kidney:  10.0 x 4.3 x 4 point cm. No hydronephrosis or calculi. The   left kidney is echogenic. There are multiple anechoic lesions without   septation, nodularity, calcification or flow which are most compatible   with cysts. The largest lesion is in the upper pole and measures 2.2 x   1.6 x 2.0 cm.      IMPRESSION: 62M w/ HTN, HLD, gout, CKD3, and CAD-CABG, 5/3/18 a/w CP/SOB    (1)Renal - Nonproteinuric CKD3 of unclear exact etiology. Resolving mild superimposed hemodynamic JOURDAN  (2)Chest pain - indicated for ischemia workup/may need cath. No renal objection to proceeding; it appears that his renal function is at baseline.      RECOMMEND:  (1)No renal objection to cath; would give NS 250cc bolus pre cath and 125cc/h x 4 hours post cath   (2)Dose new meds for GFR 30-40ml/min              Jono Andino MD  [facility]  [phone] (+)chest pain; (+)intermittent SOB      VITAL:  T(C): , Max: 36.9 (05-06-18 @ 10:26)  T(F): , Max: 98.5 (05-06-18 @ 10:26)  HR: 88 (05-07-18 @ 06:24)  BP: 127/88 (05-07-18 @ 06:24)  BP(mean): --  RR: 18 (05-07-18 @ 06:24)  SpO2: 99% (05-07-18 @ 06:24)      PHYSICAL EXAM:  Constitutional: NAD, Alert; frail  HEENT: NCAT, DMM  Neck: Supple, No JVD  Respiratory: CTA-b/l  Cardiovascular: RRR s1s2, no m/r/g  Gastrointestinal: BS+, soft, NT/ND  Extremities: No peripheral edema b/l  Neurological: no focal deficits; strength grossly intact  Psychiatric: Normal mood, normal affect  Back: no CVAT b/l  Skin: No rashes, no nevi (+)large tophi, including a sljz-nrvw-suczj lesion of his right elbow.      LABS:                        14.3   12.65 )-----------( 231      ( 07 May 2018 06:44 )             46.7     Na(137)/K(4.6)/Cl(105)/HCO3(15)/BUN(35)/Cr(1.55)Glu(91)/Ca(9.3)/Mg(1.7)/PO4(--)    05-07 @ 06:44  Na(138)/K(4.8)/Cl(105)/HCO3(18)/BUN(41)/Cr(1.68)Glu(93)/Ca(9.5)/Mg(1.9)/PO4(--)    05-06 @ 06:15  Na(135)/K(4.4)/Cl(101)/HCO3(21)/BUN(37)/Cr(1.85)Glu(94)/Ca(9.3)/Mg(1.7)/PO4(--)    05-05 @ 06:57    IMAGING:  Renal Ultrasound 5/4/18  Right kidney:  9.7 x 5.1 x 4.2 cm. No hydronephrosis or calculi. The   right kidney is echogenic. There are multiple anechoic lesions without   definite septation, nodularity, calcification, or flow which are most   compatible with cysts. The largest lesion in the midpole measures 8 x 9 x   11 mm.  Left kidney:  10.0 x 4.3 x 4 point cm. No hydronephrosis or calculi. The   left kidney is echogenic. There are multiple anechoic lesions without   septation, nodularity, calcification or flow which are most compatible   with cysts. The largest lesion is in the upper pole and measures 2.2 x   1.6 x 2.0 cm.      IMPRESSION: 62M w/ HTN, HLD, gout, CKD3, and CAD-CABG, 5/3/18 a/w CP/SOB    (1)Renal - Nonproteinuric CKD3; likely from microvascular disease; the cysts seen on ultrasound likely do not represent any disease process causing renal impairment. Resolving mild superimposed hemodynamic JOURDAN    (2)Chest pain - indicated for ischemia workup/may need cath. No renal objection to proceeding; it appears that his renal function is at baseline.      RECOMMEND:  (1)No renal objection to cath; would give NS 250cc bolus pre cath and 125cc/h x 4 hours post cath   (2)Dose new meds for GFR 30-40ml/min              Jono Andino MD  [facility]  [phone]

## 2018-05-07 NOTE — CHART NOTE - NSCHARTNOTEFT_GEN_A_CORE
Patient s/p cardiac cath via right femoral artery dressing C/D/I, no hematoma, no bleeding, +DP pulse. Will continue to monitor.

## 2018-05-08 LAB
BUN SERPL-MCNC: 31 MG/DL — HIGH (ref 7–23)
CALCIUM SERPL-MCNC: 8.9 MG/DL — SIGNIFICANT CHANGE UP (ref 8.4–10.5)
CHLORIDE SERPL-SCNC: 102 MMOL/L — SIGNIFICANT CHANGE UP (ref 98–107)
CK MB BLD-MCNC: 1.7 NG/ML — SIGNIFICANT CHANGE UP (ref 1–6.6)
CK SERPL-CCNC: 48 U/L — SIGNIFICANT CHANGE UP (ref 30–200)
CO2 SERPL-SCNC: 19 MMOL/L — LOW (ref 22–31)
CREAT SERPL-MCNC: 1.5 MG/DL — HIGH (ref 0.5–1.3)
GLUCOSE SERPL-MCNC: 95 MG/DL — SIGNIFICANT CHANGE UP (ref 70–99)
HCT VFR BLD CALC: 41.1 % — SIGNIFICANT CHANGE UP (ref 39–50)
HGB BLD-MCNC: 13 G/DL — SIGNIFICANT CHANGE UP (ref 13–17)
MAGNESIUM SERPL-MCNC: 2 MG/DL — SIGNIFICANT CHANGE UP (ref 1.6–2.6)
MCHC RBC-ENTMCNC: 27.9 PG — SIGNIFICANT CHANGE UP (ref 27–34)
MCHC RBC-ENTMCNC: 31.6 % — LOW (ref 32–36)
MCV RBC AUTO: 88.2 FL — SIGNIFICANT CHANGE UP (ref 80–100)
NRBC # FLD: 0 — SIGNIFICANT CHANGE UP
PLATELET # BLD AUTO: 213 K/UL — SIGNIFICANT CHANGE UP (ref 150–400)
PMV BLD: 10 FL — SIGNIFICANT CHANGE UP (ref 7–13)
POTASSIUM SERPL-MCNC: 4.6 MMOL/L — SIGNIFICANT CHANGE UP (ref 3.5–5.3)
POTASSIUM SERPL-SCNC: 4.6 MMOL/L — SIGNIFICANT CHANGE UP (ref 3.5–5.3)
RBC # BLD: 4.66 M/UL — SIGNIFICANT CHANGE UP (ref 4.2–5.8)
RBC # FLD: 14.3 % — SIGNIFICANT CHANGE UP (ref 10.3–14.5)
SODIUM SERPL-SCNC: 135 MMOL/L — SIGNIFICANT CHANGE UP (ref 135–145)
TROPONIN T SERPL-MCNC: < 0.06 NG/ML — SIGNIFICANT CHANGE UP (ref 0–0.06)
WBC # BLD: 8.68 K/UL — SIGNIFICANT CHANGE UP (ref 3.8–10.5)
WBC # FLD AUTO: 8.68 K/UL — SIGNIFICANT CHANGE UP (ref 3.8–10.5)

## 2018-05-08 PROCEDURE — 93010 ELECTROCARDIOGRAM REPORT: CPT

## 2018-05-08 RX ORDER — PANTOPRAZOLE SODIUM 20 MG/1
40 TABLET, DELAYED RELEASE ORAL
Qty: 0 | Refills: 0 | Status: DISCONTINUED | OUTPATIENT
Start: 2018-05-08 | End: 2018-05-10

## 2018-05-08 RX ORDER — SUCRALFATE 1 G
1 TABLET ORAL ONCE
Qty: 0 | Refills: 0 | Status: COMPLETED | OUTPATIENT
Start: 2018-05-08 | End: 2018-05-08

## 2018-05-08 RX ORDER — SIMETHICONE 80 MG/1
80 TABLET, CHEWABLE ORAL ONCE
Qty: 0 | Refills: 0 | Status: COMPLETED | OUTPATIENT
Start: 2018-05-08 | End: 2018-05-08

## 2018-05-08 RX ADMIN — Medication 650 MILLIGRAM(S): at 21:33

## 2018-05-08 RX ADMIN — AMLODIPINE BESYLATE 5 MILLIGRAM(S): 2.5 TABLET ORAL at 05:36

## 2018-05-08 RX ADMIN — CLOPIDOGREL BISULFATE 75 MILLIGRAM(S): 75 TABLET, FILM COATED ORAL at 11:47

## 2018-05-08 RX ADMIN — Medication 650 MILLIGRAM(S): at 20:33

## 2018-05-08 RX ADMIN — ATORVASTATIN CALCIUM 80 MILLIGRAM(S): 80 TABLET, FILM COATED ORAL at 20:33

## 2018-05-08 RX ADMIN — FEBUXOSTAT 80 MILLIGRAM(S): 40 TABLET ORAL at 11:45

## 2018-05-08 RX ADMIN — Medication 81 MILLIGRAM(S): at 11:48

## 2018-05-08 RX ADMIN — PANTOPRAZOLE SODIUM 40 MILLIGRAM(S): 20 TABLET, DELAYED RELEASE ORAL at 20:59

## 2018-05-08 RX ADMIN — Medication 25 MILLIGRAM(S): at 05:36

## 2018-05-08 RX ADMIN — Medication 1 GRAM(S): at 20:59

## 2018-05-08 RX ADMIN — LISINOPRIL 5 MILLIGRAM(S): 2.5 TABLET ORAL at 05:36

## 2018-05-08 RX ADMIN — SIMETHICONE 80 MILLIGRAM(S): 80 TABLET, CHEWABLE ORAL at 20:59

## 2018-05-08 RX ADMIN — ISOSORBIDE MONONITRATE 90 MILLIGRAM(S): 60 TABLET, EXTENDED RELEASE ORAL at 11:47

## 2018-05-08 NOTE — PROGRESS NOTE ADULT - ATTENDING COMMENTS
Patient seen and examined.  Agree with above.   -pt. with intermittent chest pain for several months  -ecg today normal  -pt. for staged pci to [facility]-lad tomorrow    Gary Grace MD

## 2018-05-08 NOTE — PROGRESS NOTE ADULT - SUBJECTIVE AND OBJECTIVE BOX
Subjective: c/o dizziness when ambulating out of bed today, c/o intermittent headache.  No CP or SOB              MEDICATIONS  (STANDING):  amLODIPine   Tablet 5 milliGRAM(s) Oral daily  aspirin enteric coated 81 milliGRAM(s) Oral daily  atorvastatin 80 milliGRAM(s) Oral at bedtime  clopidogrel Tablet 75 milliGRAM(s) Oral daily  febuxostat 80 milliGRAM(s) Oral daily  isosorbide   mononitrate ER Tablet (IMDUR) 90 milliGRAM(s) Oral daily  lisinopril 5 milliGRAM(s) Oral daily  metoprolol succinate ER 25 milliGRAM(s) Oral daily    MEDICATIONS  (PRN):  acetaminophen   Tablet. 650 milliGRAM(s) Oral every 6 hours PRN mild and moderated headache      LABS:                        13.0   8.68  )-----------( 213      ( 08 May 2018 06:30 )             41.1     135  |  102  |  31<H>  ----------------------------<  95  4.6   |  19<L>  |  1.50<H>    Ca    8.9      08 May 2018 06:30  Mg     2.0     05-08    Creatinine Trend: 1.50<--, 1.55<--, 1.68<--, 1.85<--, 2.08<--, 2.13<--     CARDIAC MARKERS ( 07 May 2018 06:44 )  x     / < 0.06 ng/mL / 59 u/L / 1.81 ng/mL / x      CARDIAC MARKERS ( 06 May 2018 15:09 )  x     / < 0.06 ng/mL / 34 u/L / 1.64 ng/mL / x      CARDIAC MARKERS ( 05 May 2018 12:27 )  x     / < 0.06 ng/mL / 34 u/L / 1.70 ng/mL / x          PHYSICAL EXAM  Vital Signs Last 24 Hrs  T(C): 36.5 (08 May 2018 05:33), Max: 37.1 (07 May 2018 21:25)  T(F): 97.7 (08 May 2018 05:33), Max: 98.7 (07 May 2018 21:25)  HR: 69 (08 May 2018 05:33) (69 - 79)  BP: 126/85 (08 May 2018 05:33) (93/54 - 126/85)  RR: 16 (08 May 2018 05:33) (16 - 16)  SpO2: 100% (08 May 2018 05:33) (99% - 100%)      Cardiovascular:  S1S2 RRR, No JVD  Respiratory: Lungs clear to auscultation, normal effort  Gastrointestinal: Abdomen soft, ND, NT, +BS  Skin: Warm, dry, intact. No rash.  Musculoskeletal: Normal ROM, normal strength  Ext: No C/C/E B/L LE    DIAGNOSTIC DATA  TELEMETRY: NSR    < from: CT Head No Cont (05.03.18 @ 12:44) >  IMPRESSION:    No evidence for calvarial fracture or acute intracranial hemorrhage. If   the patient has new and persistent symptoms, consider short interval   follow-up head CT or brain MRI follow-up if there are no MRI   contraindications.    Moderate dilatation of the lateral and third ventricles is noted as   described. Some considerations include chronic hydrocephalus, normal   pressure hydrocephalus, or central greater than cortical atrophy.    < end of copied text >      ASSESSMENT/PLAN: 	62 year old Male w/ hx of CAD s/p CABG in 2015, s/p  PCI 2016, with a normal NST reported 1 month ago at [facility] clinic, who is admitted with recurrent CP    --ACS ruled out with serial CE  --s/p [facility], await staged PCI to [facility]   --s/p head CT with moderate dilation of lateral and third ventricles.  Neuro consult appreciated.  Head CT reviewed and PT with likely congential hydrocephalus, from [place], denies any complications during birth or during development. Needs OP f/u with a Neurologist  --Neuro to f/u headache today  --check orthostatics  --monitor renal fxn    Tiarra Dumont PA-C

## 2018-05-08 NOTE — PROGRESS NOTE ADULT - SUBJECTIVE AND OBJECTIVE BOX
[facility]        - Patient seen and examined.  - Today, patient is without complaints.         *****MEDICATIONS: Current medication reviewed and documented.    MEDICATIONS  (STANDING):  amLODIPine   Tablet 5 milliGRAM(s) Oral daily  aspirin enteric coated 81 milliGRAM(s) Oral daily  atorvastatin 80 milliGRAM(s) Oral at bedtime  clopidogrel Tablet 75 milliGRAM(s) Oral daily  febuxostat 80 milliGRAM(s) Oral daily  isosorbide   mononitrate ER Tablet (IMDUR) 90 milliGRAM(s) Oral daily  lisinopril 5 milliGRAM(s) Oral daily  metoprolol succinate ER 25 milliGRAM(s) Oral daily    MEDICATIONS  (PRN):  acetaminophen   Tablet. 650 milliGRAM(s) Oral every 6 hours PRN mild and moderated headache           ***** REVIEW OF SYSTEM:  GEN: no fever, no chills, no pain  RESP: no SOB, no cough, no sputum  CVS: no chest pain, no palpitations, no edema  GI: no abdominal pain, no nausea, no vomiting, no constipation, no diarrhea  : no dysurea, no frequency  NEURO: no headache, no diziness  PSYCH: no depression, not anxious  Derm : no itching, no rash         ***** VITAL SIGNS:  T(F): 97.7 (18 @ 05:33), Max: 98.7 (18 @ 21:25)  HR: 69 (18 @ 05:33) (69 - 78)  BP: 126/85 (18 @ 05:33) (93/54 - 126/85)  RR: 16 (18 @ 05:33) (16 - 16)  SpO2: 100% (18 @ 05:33) (99% - 100%)  Wt(kg): --  ,   I&O's Summary    07 May 2018 07:01  -  08 May 2018 07:00  --------------------------------------------------------  IN: 100 mL / OUT: 0 mL / NET: 100 mL             *****PHYSICAL EXAM:   alert oriented x 3 attention comprehension are fair.  Able to name, repeat.   EOmi fundi not visualized   no nystagmus VFF to confrontation  Tongue is midline  Palate elevates symmetrically   Moving all 4 ext spontaneously no drift appreciated    Gait not assessed.            *****LAB AND IMAGIN.0   8.68  )-----------( 213      ( 08 May 2018 06:30 )             41.1                   135  |  102  |  31<H>  ----------------------------<  95  4.6   |  19<L>  |  1.50<H>    Ca    8.9      08 May 2018 06:30  Mg     2.0           PTT - ( 06 May 2018 15:09 )  PTT:24.2 SEC       CARDIAC MARKERS ( 07 May 2018 06:44 )  x     / < 0.06 ng/mL / 59 u/L / 1.81 ng/mL / x      CARDIAC MARKERS ( 06 May 2018 15:09 )  x     / < 0.06 ng/mL / 34 u/L / 1.64 ng/mL / x        Vitamin B12, Serum: 1746 pg/mL (18 @ 06:15)      Treponema Pallidum Antibody Interpretation: Negative: A negative treponemal  antibody screen indicates no  serologic evidence of syphilis (early infection cannot be  excluded) and no additional testing is required.   A  positive screen is followed by testing for RPR.   Positive  RPR indicates serologic evidence of new, inadequately  treated, or persistent syphilis infection and titer will  be performed.  A negative RPR following a positive  treponemal screen may indicate adequately treated or  resolved past syphilis infection.  A negative RPR will  trigger a specific treponemal  antibody test, TPPA  (treponema pallidum passive particle agglutination).   A  positive TPPA confirms previous or current syphilis  infection.   A negative TPPA suggests that the original  treponemal antibody screen was a false positive, but  clinical assessment of the patient is recommended. (18 @ 06:15)      [All pertinent recent Imaging/Reports reviewed]           *****A S S E S S M E N T   A N D   P L A N :    62y Male w/ hx of CAD, cabg  PCI 2016 normal nuc 1 month ago at [facility] admitted with recurrent CP r/o for MI found with renal dysfunction of unknown chronicity  ct head shows dilated ventricles without any clinical symptoms of nph, such as memory loss, magnetic gait or incontinence.  PT with likely congential hydrocephalus, [place], denies any complications during birth or during development.   PT should follow up with outpt neurologist as this may evolve.  will continue to follow up while he is inhouse.  check b12, folate, tsh, rpr wnl      Thank you for allowing me to participate in the care of this patient. Please do not hesitate to call me if you have any  questions.        ________________  Joanna Pace MD  [facility] ([facility]  [phone]     30 minutes spent on total encounter; more than 50 % of the visit was  spent counseling and or  coordinating care by the attending physician.   At the present time, [facility] does not  provide outpatient followup, best to call the your insurance to find a participating provider.  This was explained to you at the time of the visit. Alternatively, if your insurance allows it, you can follow up with a neurologist  Dr. Maicol Templeton([place]) [phone] or Dr. Rob Tavera ( [place]) [phone]

## 2018-05-08 NOTE — PROGRESS NOTE ADULT - SUBJECTIVE AND OBJECTIVE BOX
Patient is a 62y old  Male who presents with a chief complaint of     INTERVAL HPI/OVERNIGHT EVENTS:  T(C): 36.5 (05-08-18 @ 05:33), Max: 37.1 (05-07-18 @ 21:25)  HR: 69 (05-08-18 @ 05:33) (69 - 78)  BP: 126/85 (05-08-18 @ 05:33) (100/55 - 126/85)  RR: 16 (05-08-18 @ 05:33) (16 - 16)  SpO2: 100% (05-08-18 @ 05:33) (99% - 100%)  Wt(kg): --  I&O's Summary    07 May 2018 07:01  -  08 May 2018 07:00  --------------------------------------------------------  IN: 100 mL / OUT: 0 mL / NET: 100 mL        LABS:                        13.0   8.68  )-----------( 213      ( 08 May 2018 06:30 )             41.1     05-08    135  |  102  |  31<H>  ----------------------------<  95  4.6   |  19<L>  |  1.50<H>    Ca    8.9      08 May 2018 06:30  Mg     2.0     05-08          CAPILLARY BLOOD GLUCOSE                MEDICATIONS  (STANDING):  amLODIPine   Tablet 5 milliGRAM(s) Oral daily  aspirin enteric coated 81 milliGRAM(s) Oral daily  atorvastatin 80 milliGRAM(s) Oral at bedtime  clopidogrel Tablet 75 milliGRAM(s) Oral daily  febuxostat 80 milliGRAM(s) Oral daily  isosorbide   mononitrate ER Tablet (IMDUR) 90 milliGRAM(s) Oral daily  lisinopril 5 milliGRAM(s) Oral daily  metoprolol succinate ER 25 milliGRAM(s) Oral daily    MEDICATIONS  (PRN):  acetaminophen   Tablet. 650 milliGRAM(s) Oral every 6 hours PRN mild and moderated headache          PHYSICAL EXAM:  GENERAL: NAD, well-groomed, well-developed  HEAD:  Atraumatic, Normocephalic  CHEST/LUNG: Clear to percussion bilaterally; No rales, rhonchi, wheezing, or rubs  HEART: Regular rate and rhythm; No murmurs, rubs, or gallops  ABDOMEN: Soft, Nontender, Nondistended; Bowel sounds present  EXTREMITIES:  2+ Peripheral Pulses, No clubbing, cyanosis, or edema  LYMPH: No lymphadenopathy noted  SKIN: No rashes or lesions    Care Discussed with Consultants/Other Providers [ ] YES  [ ] NO

## 2018-05-08 NOTE — PROGRESS NOTE ADULT - SUBJECTIVE AND OBJECTIVE BOX
No pain, no shortness of breath      VITAL:  T(C): , Max: 37.1 (05-07-18 @ 21:25)  T(F): , Max: 98.7 (05-07-18 @ 21:25)  HR: 69 (05-08-18 @ 05:33)  BP: 126/85 (05-08-18 @ 05:33)  BP(mean): --  RR: 16 (05-08-18 @ 05:33)  SpO2: 100% (05-08-18 @ 05:33)      PHYSICAL EXAM:  Constitutional: NAD, Alert; frail  HEENT: NCAT, DMM  Neck: Supple, No JVD  Respiratory: CTA-b/l  Cardiovascular: RRR s1s2, no m/r/g  Gastrointestinal: BS+, soft, NT/ND  Extremities: No peripheral edema b/l  Neurological: no focal deficits; strength grossly intact  Psychiatric: Normal mood, normal affect  Back: no CVAT b/l  Skin: No rashes, no nevi (+)large tophi, including a xesd-uxyy-gkluo lesion of his right elbow.      LABS:                        13.0   8.68  )-----------( 213      ( 08 May 2018 06:30 )             41.1     Na(135)/K(4.6)/Cl(102)/HCO3(19)/BUN(31)/Cr(1.50)Glu(95)/Ca(8.9)/Mg(2.0)/PO4(--)    05-08 @ 06:30  Na(137)/K(4.6)/Cl(105)/HCO3(15)/BUN(35)/Cr(1.55)Glu(91)/Ca(9.3)/Mg(1.7)/PO4(--)    05-07 @ 06:44  Na(138)/K(4.8)/Cl(105)/HCO3(18)/BUN(41)/Cr(1.68)Glu(93)/Ca(9.5)/Mg(1.9)/PO4(--)    05-06 @ 06:15      IMPRESSION: 62M w/ HTN, HLD, gout, CKD3, and CAD-CABG, 5/3/18 a/w CP/SOB    (1)Renal - Nonproteinuric CKD3; likely from microvascular disease; the cysts seen on ultrasound likely do not represent any disease process causing renal impairment. Resolving/resolved mild superimposed hemodynamic JOURDAN; no evidence of contrast nephropathy to date, s/p cardiac cath 5/7/18    (2)Chest pain - s/p cath yesterday    (3)Metabolic acidosis - mild; mildly increased anion gap. Unclear exact etiology. Does not require aggressive workup for now.     (4)CV - acceptable volume status. History of severe/recurrent gout; we should try to avoid diuretics if possible as they would increase his risk of further gout flares      RECOMMEND:  (1)Meds as ordered  (2)D/C planning per primary team - I am not certain as to whether I take his insurance as outpatient; if so, he could f/u at my office in 2-4 weeks              Jono Andino MD  [facility]  [phone] No pain, no shortness of breath      VITAL:  T(C): , Max: 37.1 (05-07-18 @ 21:25)  T(F): , Max: 98.7 (05-07-18 @ 21:25)  HR: 69 (05-08-18 @ 05:33)  BP: 126/85 (05-08-18 @ 05:33)  BP(mean): --  RR: 16 (05-08-18 @ 05:33)  SpO2: 100% (05-08-18 @ 05:33)      PHYSICAL EXAM:  Constitutional: NAD, Alert; frail  HEENT: NCAT, DMM  Neck: Supple, No JVD  Respiratory: CTA-b/l  Cardiovascular: RRR s1s2, no m/r/g  Gastrointestinal: BS+, soft, NT/ND  Extremities: No peripheral edema b/l  Neurological: no focal deficits; strength grossly intact  Psychiatric: Normal mood, normal affect  Back: no CVAT b/l  Skin: No rashes, no nevi (+)large tophi, including a laqp-xcqk-wsfqq lesion of his right elbow.      LABS:                        13.0   8.68  )-----------( 213      ( 08 May 2018 06:30 )             41.1     Na(135)/K(4.6)/Cl(102)/HCO3(19)/BUN(31)/Cr(1.50)Glu(95)/Ca(8.9)/Mg(2.0)/PO4(--)    05-08 @ 06:30  Na(137)/K(4.6)/Cl(105)/HCO3(15)/BUN(35)/Cr(1.55)Glu(91)/Ca(9.3)/Mg(1.7)/PO4(--)    05-07 @ 06:44  Na(138)/K(4.8)/Cl(105)/HCO3(18)/BUN(41)/Cr(1.68)Glu(93)/Ca(9.5)/Mg(1.9)/PO4(--)    05-06 @ 06:15      IMPRESSION: 62M w/ HTN, HLD, gout, CKD3, and CAD-CABG, 5/3/18 a/w CP/SOB    (1)Renal - Nonproteinuric CKD3; likely from microvascular disease; the cysts seen on ultrasound likely do not represent any disease process causing renal impairment. Resolving/resolved mild superimposed hemodynamic JOURDAN; no evidence of contrast nephropathy to date, s/p cardiac cath 5/7/18    (2)Chest pain - s/p diagnostic cath yesterday - needing PCI    (3)Metabolic acidosis - mild; mildly increased anion gap. Unclear exact etiology. Does not require aggressive workup for now.     (4)CV - acceptable volume status. History of severe/recurrent gout; we should try to avoid diuretics if possible as they would increase his risk of further gout flares      RECOMMEND:  (1)No objection to cath tomorrow; NS 250cc bolus precath and 125cc/h x 4 hours              Jono Andino MD  [facility]  [phone]

## 2018-05-09 LAB
BUN SERPL-MCNC: 36 MG/DL — HIGH (ref 7–23)
CALCIUM SERPL-MCNC: 9.1 MG/DL — SIGNIFICANT CHANGE UP (ref 8.4–10.5)
CHLORIDE SERPL-SCNC: 103 MMOL/L — SIGNIFICANT CHANGE UP (ref 98–107)
CO2 SERPL-SCNC: 22 MMOL/L — SIGNIFICANT CHANGE UP (ref 22–31)
CREAT SERPL-MCNC: 1.67 MG/DL — HIGH (ref 0.5–1.3)
GLUCOSE SERPL-MCNC: 86 MG/DL — SIGNIFICANT CHANGE UP (ref 70–99)
HCT VFR BLD CALC: 44.1 % — SIGNIFICANT CHANGE UP (ref 39–50)
HGB BLD-MCNC: 13.8 G/DL — SIGNIFICANT CHANGE UP (ref 13–17)
MAGNESIUM SERPL-MCNC: 2 MG/DL — SIGNIFICANT CHANGE UP (ref 1.6–2.6)
MCHC RBC-ENTMCNC: 27.7 PG — SIGNIFICANT CHANGE UP (ref 27–34)
MCHC RBC-ENTMCNC: 31.3 % — LOW (ref 32–36)
MCV RBC AUTO: 88.4 FL — SIGNIFICANT CHANGE UP (ref 80–100)
NRBC # FLD: 0 — SIGNIFICANT CHANGE UP
PLATELET # BLD AUTO: 220 K/UL — SIGNIFICANT CHANGE UP (ref 150–400)
PMV BLD: 10 FL — SIGNIFICANT CHANGE UP (ref 7–13)
POTASSIUM SERPL-MCNC: 5.2 MMOL/L — SIGNIFICANT CHANGE UP (ref 3.5–5.3)
POTASSIUM SERPL-SCNC: 5.2 MMOL/L — SIGNIFICANT CHANGE UP (ref 3.5–5.3)
RBC # BLD: 4.99 M/UL — SIGNIFICANT CHANGE UP (ref 4.2–5.8)
RBC # FLD: 14.3 % — SIGNIFICANT CHANGE UP (ref 10.3–14.5)
SODIUM SERPL-SCNC: 137 MMOL/L — SIGNIFICANT CHANGE UP (ref 135–145)
WBC # BLD: 9.89 K/UL — SIGNIFICANT CHANGE UP (ref 3.8–10.5)
WBC # FLD AUTO: 9.89 K/UL — SIGNIFICANT CHANGE UP (ref 3.8–10.5)

## 2018-05-09 PROCEDURE — 93010 ELECTROCARDIOGRAM REPORT: CPT

## 2018-05-09 PROCEDURE — 93010 ELECTROCARDIOGRAM REPORT: CPT | Mod: 77

## 2018-05-09 PROCEDURE — 99152 MOD SED SAME PHYS/QHP 5/>YRS: CPT

## 2018-05-09 PROCEDURE — 93571 IV DOP VEL&/PRESS C FLO 1ST: CPT | Mod: 26,LD

## 2018-05-09 PROCEDURE — 92928 PRQ TCAT PLMT NTRAC ST 1 LES: CPT | Mod: LD

## 2018-05-09 RX ORDER — SODIUM CHLORIDE 9 MG/ML
1000 INJECTION INTRAMUSCULAR; INTRAVENOUS; SUBCUTANEOUS
Qty: 0 | Refills: 0 | Status: COMPLETED | OUTPATIENT
Start: 2018-05-09 | End: 2018-05-09

## 2018-05-09 RX ORDER — SODIUM CHLORIDE 9 MG/ML
250 INJECTION INTRAMUSCULAR; INTRAVENOUS; SUBCUTANEOUS ONCE
Qty: 0 | Refills: 0 | Status: COMPLETED | OUTPATIENT
Start: 2018-05-09 | End: 2018-05-09

## 2018-05-09 RX ORDER — LANOLIN ALCOHOL/MO/W.PET/CERES
3 CREAM (GRAM) TOPICAL ONCE
Qty: 0 | Refills: 0 | Status: COMPLETED | OUTPATIENT
Start: 2018-05-09 | End: 2018-05-09

## 2018-05-09 RX ORDER — FENTANYL CITRATE 50 UG/ML
25 INJECTION INTRAVENOUS ONCE
Qty: 0 | Refills: 0 | Status: DISCONTINUED | OUTPATIENT
Start: 2018-05-09 | End: 2018-05-09

## 2018-05-09 RX ADMIN — FEBUXOSTAT 80 MILLIGRAM(S): 40 TABLET ORAL at 11:53

## 2018-05-09 RX ADMIN — Medication 81 MILLIGRAM(S): at 11:53

## 2018-05-09 RX ADMIN — PANTOPRAZOLE SODIUM 40 MILLIGRAM(S): 20 TABLET, DELAYED RELEASE ORAL at 05:38

## 2018-05-09 RX ADMIN — ISOSORBIDE MONONITRATE 90 MILLIGRAM(S): 60 TABLET, EXTENDED RELEASE ORAL at 11:53

## 2018-05-09 RX ADMIN — Medication 25 MILLIGRAM(S): at 05:38

## 2018-05-09 RX ADMIN — FENTANYL CITRATE 25 MICROGRAM(S): 50 INJECTION INTRAVENOUS at 17:20

## 2018-05-09 RX ADMIN — Medication 3 MILLIGRAM(S): at 23:49

## 2018-05-09 RX ADMIN — CLOPIDOGREL BISULFATE 75 MILLIGRAM(S): 75 TABLET, FILM COATED ORAL at 11:53

## 2018-05-09 RX ADMIN — ATORVASTATIN CALCIUM 80 MILLIGRAM(S): 80 TABLET, FILM COATED ORAL at 22:44

## 2018-05-09 RX ADMIN — SODIUM CHLORIDE 125 MILLILITER(S): 9 INJECTION INTRAMUSCULAR; INTRAVENOUS; SUBCUTANEOUS at 16:49

## 2018-05-09 RX ADMIN — AMLODIPINE BESYLATE 5 MILLIGRAM(S): 2.5 TABLET ORAL at 05:38

## 2018-05-09 RX ADMIN — SODIUM CHLORIDE 250 MILLILITER(S): 9 INJECTION INTRAMUSCULAR; INTRAVENOUS; SUBCUTANEOUS at 14:43

## 2018-05-09 RX ADMIN — LISINOPRIL 5 MILLIGRAM(S): 2.5 TABLET ORAL at 05:38

## 2018-05-09 RX ADMIN — Medication 650 MILLIGRAM(S): at 23:17

## 2018-05-09 RX ADMIN — FENTANYL CITRATE 25 MICROGRAM(S): 50 INJECTION INTRAVENOUS at 17:51

## 2018-05-09 NOTE — CHART NOTE - NSCHARTNOTEFT_GEN_A_CORE
Called by RN pt c/o 9/ 10 chest pressure  Patient states he has been having these symptoms off and on  over several days, denies radiation of this pain, shortness of breath, n, v, diaphoresis, syncope/ near-syncope.   pt also c/o belching and feels like his mouth has a foul odor when he gets this chest pressure.     VITAL SIGNS:  Vital Signs Last 24 Hrs  T(C): 37.2 (08 May 2018 20:02), Max: 37.2 (08 May 2018 20:02)  T(F): 98.9 (08 May 2018 20:02), Max: 98.9 (08 May 2018 20:02)  HR: 71 (08 May 2018 20:02) (69 - 78)  BP: 107/73 (08 May 2018 20:02) (100/55 - 126/85)  RR: 16 (08 May 2018 20:02) (16 - 16)  SpO2: 99% (08 May 2018 20:02) (99% - 100%)    O:  Well developed, well nourished elderly / middle age / young  Male found lying / sitting _____ alert and oriented x 3 appeared in no acute distress/ moderate distress, anxious   HEENT: Normocephalic / atraumatic, PERRLA, EOMI, No JVD / +++ JVD     Chest / Lungs: Clear to auscultation, no wheezing, rales, rhonchi noted. Good air exchange, no accessory muscles of respiration were being used. No reproducible pain noted on palpation, No  respirophasic pattern of pain noted.      Heart: S1, S2, regular rate and rhythm, no murmurs, crackles or rales noted      No Known Allergies        MEDICATIONS  (STANDING):  amLODIPine   Tablet 5 milliGRAM(s) Oral daily  aspirin enteric coated 81 milliGRAM(s) Oral daily  atorvastatin 80 milliGRAM(s) Oral at bedtime  clopidogrel Tablet 75 milliGRAM(s) Oral daily  febuxostat 80 milliGRAM(s) Oral daily  isosorbide   mononitrate ER Tablet (IMDUR) 90 milliGRAM(s) Oral daily  lisinopril 5 milliGRAM(s) Oral daily  metoprolol succinate ER 25 milliGRAM(s) Oral daily  pantoprazole    Tablet 40 milliGRAM(s) Oral before breakfast    MEDICATIONS  (PRN):  acetaminophen   Tablet. 650 milliGRAM(s) Oral every 6 hours PRN mild and moderated headache                            13.0   8.68  )-----------( 213      ( 08 May 2018 06:30 )             41.1     05-08    135  |  102  |  31<H>  ----------------------------<  95  4.6   |  19<L>  |  1.50<H>    Ca    8.9      08 May 2018 06:30  Mg     2.0     05-08      CARDIAC MARKERS ( 08 May 2018 21:08 )  x     / < 0.06 ng/mL / 48 u/L / 1.70 ng/mL / x      CARDIAC MARKERS ( 07 May 2018 06:44 )  x     / < 0.06 ng/mL / 59 u/L / 1.81 ng/mL / x          EKG - NSR @ 77 bpm no acute changes when compared with previous EKG  Tele - SR in 70's     Assessment: 62 yo male PMHx CAD with cardiac stents x2 in 2016, CABG in 2015, HTN, HLD and Gout p/w substernal chest pain woke him up this morning, constant, admitted to tele for Unstable Angina Pectoris, CE x 6 neg, s/p Cardiac Cath with CAD, awaiting PCI of LM / Left anterior descending artery. EKG without changes, repeat CE negative. Patient had similar episodes of chest pressure relieved by pain medication  Chest pressure - progressive CAD / GERD/ Gastritis - awaiting cardiac cath in am, protonix 40 mg PO x 1 dose given, and added as daily regimen, Simethicone & carafate ordered & Tylenol given - pt stable will continue to monitor

## 2018-05-09 NOTE — PROGRESS NOTE ADULT - SUBJECTIVE AND OBJECTIVE BOX
Patient is a 62y old  Male who presents with a chief complaint of chest pain  complaining of chest pain again      INTERVAL HPI/OVERNIGHT EVENTS:  T(C): 37 (05-09-18 @ 11:47), Max: 37.2 (05-08-18 @ 20:02)  HR: 71 (05-09-18 @ 11:47) (67 - 98)  BP: 105/66 (05-09-18 @ 11:47) (101/55 - 113/77)  RR: 17 (05-09-18 @ 11:47) (16 - 17)  SpO2: 100% (05-09-18 @ 11:47) (95% - 100%)  Wt(kg): --  I&O's Summary    08 May 2018 07:01  -  09 May 2018 07:00  --------------------------------------------------------  IN: 50 mL / OUT: 0 mL / NET: 50 mL    09 May 2018 07:01  -  09 May 2018 18:12  --------------------------------------------------------  IN: 120 mL / OUT: 0 mL / NET: 120 mL        LABS:                        13.8   9.89  )-----------( 220      ( 09 May 2018 05:59 )             44.1     05-09    137  |  103  |  36<H>  ----------------------------<  86  5.2   |  22  |  1.67<H>    Ca    9.1      09 May 2018 05:59  Mg     2.0     05-09          CAPILLARY BLOOD GLUCOSE                MEDICATIONS  (STANDING):  amLODIPine   Tablet 5 milliGRAM(s) Oral daily  aspirin enteric coated 81 milliGRAM(s) Oral daily  atorvastatin 80 milliGRAM(s) Oral at bedtime  clopidogrel Tablet 75 milliGRAM(s) Oral daily  febuxostat 80 milliGRAM(s) Oral daily  isosorbide   mononitrate ER Tablet (IMDUR) 90 milliGRAM(s) Oral daily  lisinopril 5 milliGRAM(s) Oral daily  metoprolol succinate ER 25 milliGRAM(s) Oral daily  pantoprazole    Tablet 40 milliGRAM(s) Oral before breakfast    MEDICATIONS  (PRN):  acetaminophen   Tablet. 650 milliGRAM(s) Oral every 6 hours PRN mild and moderated headache          PHYSICAL EXAM:  GENERAL: NAD, well-groomed, well-developed  HEAD:  Atraumatic, Normocephalic  CHEST/LUNG: Clear to percussion bilaterally; No rales, rhonchi, wheezing, or rubs  HEART: Regular rate and rhythm; No murmurs, rubs, or gallops  ABDOMEN: Soft, Nontender, Nondistended; Bowel sounds present  EXTREMITIES:  2+ Peripheral Pulses, No clubbing, cyanosis, or edema  LYMPH: No lymphadenopathy noted  SKIN: No rashes or lesions    Care Discussed with Consultants/Other Providers [ +] YES  [ ] NO

## 2018-05-09 NOTE — PROGRESS NOTE ADULT - SUBJECTIVE AND OBJECTIVE BOX
[facility]        - Patient seen and examined.  - Today, patient is without complaints. pt denies any headache, and dizziness is also improved.         *****MEDICATIONS: Current medication reviewed and documented.    MEDICATIONS  (STANDING):  amLODIPine   Tablet 5 milliGRAM(s) Oral daily  aspirin enteric coated 81 milliGRAM(s) Oral daily  atorvastatin 80 milliGRAM(s) Oral at bedtime  clopidogrel Tablet 75 milliGRAM(s) Oral daily  febuxostat 80 milliGRAM(s) Oral daily  isosorbide   mononitrate ER Tablet (IMDUR) 90 milliGRAM(s) Oral daily  lisinopril 5 milliGRAM(s) Oral daily  metoprolol succinate ER 25 milliGRAM(s) Oral daily  pantoprazole    Tablet 40 milliGRAM(s) Oral before breakfast  sodium chloride 0.9% Bolus 250 milliLiter(s) IV Bolus once  sodium chloride 0.9%. 1000 milliLiter(s) (125 mL/Hr) IV Continuous <Continuous>    MEDICATIONS  (PRN):  acetaminophen   Tablet. 650 milliGRAM(s) Oral every 6 hours PRN mild and moderated headache           ***** REVIEW OF SYSTEM:  GEN: no fever, no chills, no pain  RESP: no SOB, no cough, no sputum  CVS: no chest pain, no palpitations, no edema  GI: no abdominal pain, no nausea, no vomiting, no constipation, no diarrhea  : no dysurea, no frequency  NEURO: no headache, no diziness  PSYCH: no depression, not anxious  Derm : no itching, no rash         ***** VITAL SIGNS:  T(F): 98.6 (18 @ 11:47), Max: 98.9 (18 @ 20:02)  HR: 71 (18 @ 11:47) (67 - 98)  BP: 105/66 (18 @ 11:47) (101/55 - 113/77)  RR: 17 (18 @ 11:47) (16 - 17)  SpO2: 100% (18 @ 11:47) (95% - 100%)  Wt(kg): --  ,   I&O's Summary    08 May 2018 07:01  -  09 May 2018 07:00  --------------------------------------------------------  IN: 50 mL / OUT: 0 mL / NET: 50 mL    09 May 2018 07:01  -  09 May 2018 14:25  --------------------------------------------------------  IN: 120 mL / OUT: 0 mL / NET: 120 mL             *****PHYSICAL EXAM:   alert oriented x 3 attention comprehension are fair.  Able to name, repeat.   EOmi fundi not visualized   no nystagmus VFF to confrontation  Tongue is midline  Palate elevates symmetrically   Moving all 4 ext spontaneously no drift appreciated    Gait not magnetic            *****LAB AND IMAGIN.8   9.89  )-----------( 220      ( 09 May 2018 05:59 )             44.1                   137  |  103  |  36<H>  ----------------------------<  86  5.2   |  22  |  1.67<H>    Ca    9.1      09 May 2018 05:59  Mg     2.0                  CARDIAC MARKERS ( 08 May 2018 21:08 )  x     / < 0.06 ng/mL / 48 u/L / 1.70 ng/mL / x                    [All pertinent recent Imaging/Reports reviewed]           *****A S S E S S M E N T   A N D   P L A N :        62y Male w/ hx of CAD, cabg  PCI 2016 normal nuc 1 month ago at [facility] admitted with recurrent CP r/o for MI found with renal dysfunction of unknown chronicity  ct head shows dilated ventricles without any clinical symptoms of nph, such as memory loss, magnetic gait or incontinence.  PT with likely congential hydrocephalus, from [place], denies any complications during birth or during development.   PT should follow up with outpt neurologist as this may evolve.  will continue to follow up while he is inhouse.  check b12, folate, tsh, rpr wnl  pt headache resolved spont   dizziness likely due to bed bound state in the hospital, encourage oob to chair as tolerated.     Thank you for allowing me to participate in the care of this patient. Please do not hesitate to call me if you have any  questions.        ________________  Joanna Pace MD  [facility] [place] [facility] ([facility]  [phone]     30 minutes spent on total encounter; more than 50 % of the visit was  spent counseling and or  coordinating care by the attending physician.   At the present time, [facility] does not  provide outpatient followup, best to call the your insurance to find a participating provider.  This was explained to you at the time of the visit. Alternatively, if your insurance allows it, you can follow up with a neurologist  Dr. Maicol Templeton([place]) [phone] or Dr. Rob Tavera ( [place]) [phone]

## 2018-05-09 NOTE — PROGRESS NOTE ADULT - SUBJECTIVE AND OBJECTIVE BOX
Subjective: No CP or SOB              MEDICATIONS  (STANDING):  amLODIPine   Tablet 5 milliGRAM(s) Oral daily  aspirin enteric coated 81 milliGRAM(s) Oral daily  atorvastatin 80 milliGRAM(s) Oral at bedtime  clopidogrel Tablet 75 milliGRAM(s) Oral daily  febuxostat 80 milliGRAM(s) Oral daily  isosorbide   mononitrate ER Tablet (IMDUR) 90 milliGRAM(s) Oral daily  lisinopril 5 milliGRAM(s) Oral daily  metoprolol succinate ER 25 milliGRAM(s) Oral daily  pantoprazole    Tablet 40 milliGRAM(s) Oral before breakfast    MEDICATIONS  (PRN):  acetaminophen   Tablet. 650 milliGRAM(s) Oral every 6 hours PRN mild and moderated headache      LABS:                        13.8   9.89  )-----------( 220      ( 09 May 2018 05:59 )             44.1     137  |  103  |  36<H>  ----------------------------<  86  5.2   |  22  |  1.67<H>    Ca    9.1      09 May 2018 05:59  Mg     2.0     05-09    Creatinine Trend: 1.67<--, 1.50<--, 1.55<--, 1.68<--, 1.85<--, 2.08<--     CARDIAC MARKERS ( 08 May 2018 21:08 )  x     / < 0.06 ng/mL / 48 u/L / 1.70 ng/mL / x      CARDIAC MARKERS ( 07 May 2018 06:44 )  x     / < 0.06 ng/mL / 59 u/L / 1.81 ng/mL / x        PHYSICAL EXAM  Vital Signs Last 24 Hrs  T(C): 37 (09 May 2018 11:47), Max: 37.2 (08 May 2018 20:02)  T(F): 98.6 (09 May 2018 11:47), Max: 98.9 (08 May 2018 20:02)  HR: 71 (09 May 2018 11:47) (67 - 98)  BP: 105/66 (09 May 2018 11:47) (101/55 - 113/77)  RR: 17 (09 May 2018 11:47) (16 - 17)  SpO2: 100% (09 May 2018 11:47) (95% - 100%)    Cardiovascular:  S1S2 RRR, No JVD  Respiratory: Lungs clear to auscultation, normal effort  Gastrointestinal: Abdomen soft, ND, NT, +BS  Skin: Warm, dry, intact. No rash.  Musculoskeletal: Normal ROM, normal strength  Ext: No C/C/E B/L LE    DIAGNOSTIC DATA  TELEMETRY: NSR    < from: CT Head No Cont (05.03.18 @ 12:44) >  IMPRESSION:    No evidence for calvarial fracture or acute intracranial hemorrhage. If   the patient has new and persistent symptoms, consider short interval   follow-up head CT or brain MRI follow-up if there are no MRI   contraindications.    Moderate dilatation of the lateral and third ventricles is noted as   described. Some considerations include chronic hydrocephalus, normal   pressure hydrocephalus, or central greater than cortical atrophy.    < end of copied text >      ASSESSMENT/PLAN: 	62 year old Male w/ hx of CAD s/p CABG in 2015, s/p  PCI 2016, with a normal NST reported 1 month ago at [facility] clinic, who is admitted with recurrent CP    --ACS ruled out with serial CE  --s/p C, await staged PCI to   --s/p head CT with moderate dilation of lateral and third ventricles.  Neuro consult appreciated.  Head CT reviewed and PT with likely congential hydrocephalus, [place], denies any complications during birth or during development. Needs OP f/u with a Neurologist  --Neuro to f/u headache today  --Orthostatics negative  --monitor renal fxn    Tiarra Dumont PA-C

## 2018-05-09 NOTE — CHART NOTE - NSCHARTNOTEFT_GEN_A_CORE
Patient is s/p cardiac cath. Patient c/o chest pressure midsternal same discomfort as what brought him to the hospital, denies shortness of breath, n, v, diaphoresis or radiation of discomfort. Patient also experienced the same chest pressure s/p Cardiac Cath relieved by Fentanyl patch. Pt's symptoms resolved on its own.     EKG - NSR @ 80 bpm no acute changes noted when compared to previous EKG.   Tele - No arrhythmias noted     site is stable. No hematoma. Dressing is clean/intact/dry. 2+ radial pulse and good rap refill.  will monitor closely.     Patient stable will continue to monitor

## 2018-05-09 NOTE — PROGRESS NOTE ADULT - SUBJECTIVE AND OBJECTIVE BOX
No pain, no shortness of breath      VITAL:  T(C): , Max: 37.2 (05-08-18 @ 20:02)  T(F): , Max: 98.9 (05-08-18 @ 20:02)  HR: 67 (05-09-18 @ 07:27)  BP: 113/77 (05-09-18 @ 05:36)  BP(mean): --  RR: 16 (05-09-18 @ 05:36)  SpO2: 95% (05-09-18 @ 05:36)      PHYSICAL EXAM:  Constitutional: NAD, Alert; frail  HEENT: NCAT, DMM  Neck: Supple, No JVD  Respiratory: CTA-b/l  Cardiovascular: RRR s1s2, no m/r/g  Gastrointestinal: BS+, soft, NT/ND  Extremities: No peripheral edema b/l  Neurological: no focal deficits; strength grossly intact  Psychiatric: Normal mood, normal affect  Back: no CVAT b/l  Skin: No rashes, no nevi (+)large tophi, including a [id number]-[id number] lesion of his right elbow.    LABS:                        13.8   9.89  )-----------( 220      ( 09 May 2018 05:59 )             44.1     Na(137)/K(5.2)/Cl(103)/HCO3(22)/BUN(36)/Cr(1.67)Glu(86)/Ca(9.1)/Mg(2.0)/PO4(--)    05-09 @ 05:59  Na(135)/K(4.6)/Cl(102)/HCO3(19)/BUN(31)/Cr(1.50)Glu(95)/Ca(8.9)/Mg(2.0)/PO4(--)    05-08 @ 06:30  Na(137)/K(4.6)/Cl(105)/HCO3(15)/BUN(35)/Cr(1.55)Glu(91)/Ca(9.3)/Mg(1.7)/PO4(--)    05-07 @ 06:44      IMPRESSION: 62M w/ HTN, HLD, gout, CKD3, and CAD-CABG, 5/3/18 a/w CP/SOB    (1)Renal - Nonproteinuric CKD3; likely from microvascular disease; the cysts seen on ultrasound likely do not represent any disease process causing renal impairment. Resolving/resolved mild superimposed hemodynamic JOURDAN; no evidence of contrast nephropathy to date, s/p cardiac cath 5/7/18    (2)Chest pain - obstructive CAD noted on diagnostic cath from 5/7/18 - planned for PCI today.    (3)Metabolic acidosis - mild; improved as of today     (4)CV - acceptable volume status. History of severe/recurrent gout; we should try to avoid diuretics if possible as they would increase his risk of further gout flares      RECOMMEND:  (1)No objection to cath today; NS 250cc bolus precath and 125cc/h x 4 hours              Jono Andino MD  [facility]  [phone] No pain, no shortness of breath      VITAL:  T(C): , Max: 37.2 (05-08-18 @ 20:02)  T(F): , Max: 98.9 (05-08-18 @ 20:02)  HR: 67 (05-09-18 @ 07:27)  BP: 113/77 (05-09-18 @ 05:36)  BP(mean): --  RR: 16 (05-09-18 @ 05:36)  SpO2: 95% (05-09-18 @ 05:36)      PHYSICAL EXAM:  Constitutional: NAD, Alert; frail  HEENT: NCAT, DMM  Neck: Supple, No JVD  Respiratory: CTA-b/l  Cardiovascular: RRR s1s2, no m/r/g  Gastrointestinal: BS+, soft, NT/ND  Extremities: No peripheral edema b/l  Neurological: no focal deficits; strength grossly intact  Psychiatric: Normal mood, normal affect  Back: no CVAT b/l  Skin: No rashes, no nevi (+)large tophi, including a ioxx-goxi-txcnf lesion of his right elbow.    LABS:                        13.8   9.89  )-----------( 220      ( 09 May 2018 05:59 )             44.1     Na(137)/K(5.2)/Cl(103)/HCO3(22)/BUN(36)/Cr(1.67)Glu(86)/Ca(9.1)/Mg(2.0)/PO4(--)    05-09 @ 05:59  Na(135)/K(4.6)/Cl(102)/HCO3(19)/BUN(31)/Cr(1.50)Glu(95)/Ca(8.9)/Mg(2.0)/PO4(--)    05-08 @ 06:30  Na(137)/K(4.6)/Cl(105)/HCO3(15)/BUN(35)/Cr(1.55)Glu(91)/Ca(9.3)/Mg(1.7)/PO4(--)    05-07 @ 06:44      IMPRESSION: 62M w/ HTN, HLD, gout, CKD3, and CAD-CABG, 5/3/18 a/w CP/SOB    (1)Renal - Nonproteinuric CKD3; likely from microvascular disease; the cysts seen on ultrasound likely do not represent any disease process causing renal impairment. Resolving/resolved mild superimposed hemodynamic JOURDAN; no evidence of contrast nephropathy to date, s/p cardiac cath 5/7/18    (2)Chest pain - obstructive CAD noted on diagnostic cath from 5/7/18 - planned for PCI today.    (3)Metabolic acidosis - mild; improved as of today     (4)CV - acceptable volume status. History of severe/recurrent gout; we should try to avoid diuretics if possible as they would increase his risk of further gout flares      RECOMMEND:  (1)No objection to cath today; NS 250cc bolus precath and 125cc/h x 4 hours  (2)Can continue ACEI as ordered for now            Jono Andino MD  [facility]  [phone] No pain, no shortness of breath      VITAL:  T(C): , Max: 37.2 (05-08-18 @ 20:02)  T(F): , Max: 98.9 (05-08-18 @ 20:02)  HR: 67 (05-09-18 @ 07:27)  BP: 113/77 (05-09-18 @ 05:36)  BP(mean): --  RR: 16 (05-09-18 @ 05:36)  SpO2: 95% (05-09-18 @ 05:36)      PHYSICAL EXAM:  Constitutional: NAD, Alert; frail  HEENT: NCAT, DMM  Neck: Supple, No JVD  Respiratory: CTA-b/l  Cardiovascular: RRR s1s2, no m/r/g  Gastrointestinal: BS+, soft, NT/ND  Extremities: No peripheral edema b/l  Neurological: no focal deficits; strength grossly intact  Psychiatric: Normal mood, normal affect  Back: no CVAT b/l  Skin: No rashes, no nevi (+)large tophi, including a thqw-nczw-qkhdi lesion of his right elbow.    LABS:                        13.8   9.89  )-----------( 220      ( 09 May 2018 05:59 )             44.1     Na(137)/K(5.2)/Cl(103)/HCO3(22)/BUN(36)/Cr(1.67)Glu(86)/Ca(9.1)/Mg(2.0)/PO4(--)    05-09 @ 05:59  Na(135)/K(4.6)/Cl(102)/HCO3(19)/BUN(31)/Cr(1.50)Glu(95)/Ca(8.9)/Mg(2.0)/PO4(--)    05-08 @ 06:30  Na(137)/K(4.6)/Cl(105)/HCO3(15)/BUN(35)/Cr(1.55)Glu(91)/Ca(9.3)/Mg(1.7)/PO4(--)    05-07 @ 06:44      IMPRESSION: 62M w/ HTN, HLD, gout, CKD3, and CAD-CABG, 5/3/18 a/w CP/SOB    (1)Renal - Nonproteinuric CKD3; likely from microvascular disease; the cysts seen on ultrasound likely do not represent any disease process causing renal impairment. Resolving/resolved mild superimposed hemodynamic JOURDAN; no evidence of contrast nephropathy to date, s/p cardiac cath 5/7/18    (2)Chest pain - obstructive CAD noted on diagnostic cath from 5/7/18 - planned for PCI today.    (3)Metabolic acidosis - mild; improved as of today     (4)Hyperkalemia - borderline - on low dose ACEI - highly acceptable to maintain ACEI for now. No need to adjust care just yet in light of the borderline high K    (5)CV - acceptable volume status. History of severe/recurrent gout; we should try to avoid diuretics if possible as they would increase his risk of further gout flares        RECOMMEND:  (1)No objection to cath today; NS 250cc bolus precath and 125cc/h x 4 hours  (2)Can continue ACEI as ordered for now  (3)BMP daily while admitted          Jono Andino MD  [facility]  [phone]

## 2018-05-09 NOTE — CHART NOTE - NSCHARTNOTEFT_GEN_A_CORE
The patient is s/p PCI to LAD, c/o 6/10 chest pain. The patient claims that had the same pain pre procedure. Denies SOB  VSS /70, HR 80bpm, RR 20, O2 sat 100%  Heart - HR - reg, +1 , S2  Lungs - clear to auscultation  Abdomen - soft, non tender.  R radial site - hemoband is in place, no hematoma.   ECG post cath # 2 no new changes.  Dr. Willow Guthrie was made aware. Fentanyl 25 mg IV x 1 give with improvement in symptoms.   Will notify tele PA to continue monitoring.

## 2018-05-10 ENCOUNTER — TRANSCRIPTION ENCOUNTER (OUTPATIENT)
Age: 63
End: 2018-05-10

## 2018-05-10 VITALS
DIASTOLIC BLOOD PRESSURE: 81 MMHG | HEART RATE: 83 BPM | RESPIRATION RATE: 14 BRPM | SYSTOLIC BLOOD PRESSURE: 126 MMHG | OXYGEN SATURATION: 100 %

## 2018-05-10 LAB
BUN SERPL-MCNC: 27 MG/DL — HIGH (ref 7–23)
CALCIUM SERPL-MCNC: 8.9 MG/DL — SIGNIFICANT CHANGE UP (ref 8.4–10.5)
CHLORIDE SERPL-SCNC: 103 MMOL/L — SIGNIFICANT CHANGE UP (ref 98–107)
CO2 SERPL-SCNC: 17 MMOL/L — LOW (ref 22–31)
CREAT SERPL-MCNC: 1.43 MG/DL — HIGH (ref 0.5–1.3)
GLUCOSE SERPL-MCNC: 92 MG/DL — SIGNIFICANT CHANGE UP (ref 70–99)
HCT VFR BLD CALC: 40.6 % — SIGNIFICANT CHANGE UP (ref 39–50)
HGB BLD-MCNC: 13.2 G/DL — SIGNIFICANT CHANGE UP (ref 13–17)
MAGNESIUM SERPL-MCNC: 1.9 MG/DL — SIGNIFICANT CHANGE UP (ref 1.6–2.6)
MCHC RBC-ENTMCNC: 28.5 PG — SIGNIFICANT CHANGE UP (ref 27–34)
MCHC RBC-ENTMCNC: 32.5 % — SIGNIFICANT CHANGE UP (ref 32–36)
MCV RBC AUTO: 87.7 FL — SIGNIFICANT CHANGE UP (ref 80–100)
NRBC # FLD: 0 — SIGNIFICANT CHANGE UP
PLATELET # BLD AUTO: 197 K/UL — SIGNIFICANT CHANGE UP (ref 150–400)
PMV BLD: 10.3 FL — SIGNIFICANT CHANGE UP (ref 7–13)
POTASSIUM SERPL-MCNC: 4.9 MMOL/L — SIGNIFICANT CHANGE UP (ref 3.5–5.3)
POTASSIUM SERPL-SCNC: 4.9 MMOL/L — SIGNIFICANT CHANGE UP (ref 3.5–5.3)
RBC # BLD: 4.63 M/UL — SIGNIFICANT CHANGE UP (ref 4.2–5.8)
RBC # FLD: 14.2 % — SIGNIFICANT CHANGE UP (ref 10.3–14.5)
SODIUM SERPL-SCNC: 133 MMOL/L — LOW (ref 135–145)
WBC # BLD: 9.65 K/UL — SIGNIFICANT CHANGE UP (ref 3.8–10.5)
WBC # FLD AUTO: 9.65 K/UL — SIGNIFICANT CHANGE UP (ref 3.8–10.5)

## 2018-05-10 RX ORDER — ATORVASTATIN CALCIUM 80 MG/1
1 TABLET, FILM COATED ORAL
Qty: 0 | Refills: 0 | DISCHARGE
Start: 2018-05-10

## 2018-05-10 RX ORDER — ROSUVASTATIN CALCIUM 5 MG/1
1 TABLET ORAL
Qty: 0 | Refills: 0 | COMMUNITY

## 2018-05-10 RX ADMIN — Medication 25 MILLIGRAM(S): at 05:13

## 2018-05-10 RX ADMIN — ISOSORBIDE MONONITRATE 90 MILLIGRAM(S): 60 TABLET, EXTENDED RELEASE ORAL at 12:50

## 2018-05-10 RX ADMIN — Medication 650 MILLIGRAM(S): at 00:15

## 2018-05-10 RX ADMIN — LISINOPRIL 5 MILLIGRAM(S): 2.5 TABLET ORAL at 05:13

## 2018-05-10 RX ADMIN — FEBUXOSTAT 80 MILLIGRAM(S): 40 TABLET ORAL at 12:50

## 2018-05-10 RX ADMIN — PANTOPRAZOLE SODIUM 40 MILLIGRAM(S): 20 TABLET, DELAYED RELEASE ORAL at 05:13

## 2018-05-10 RX ADMIN — AMLODIPINE BESYLATE 5 MILLIGRAM(S): 2.5 TABLET ORAL at 05:13

## 2018-05-10 RX ADMIN — Medication 81 MILLIGRAM(S): at 12:50

## 2018-05-10 RX ADMIN — CLOPIDOGREL BISULFATE 75 MILLIGRAM(S): 75 TABLET, FILM COATED ORAL at 12:50

## 2018-05-10 NOTE — PROGRESS NOTE ADULT - ASSESSMENT
Problem/Plan - 1:  ·  Problem: Unstable angina pectoris.  Plan: telemonitor   continue ASA, Plavix, Statin and Metoprolol  Ischemia munoz as per cards  will monitor    Problem/Plan - 2:  ·  Problem: JOURDAN (acute kidney injury).  Plan: Monitor electrolytes  monitor cr  renal consult appreciated    Problem/Plan - 3:  ·  Problem: HTN .  Plan: DASH diet  Continue bp meds.     Problem/Plan - 4:  ·  Problem: HLD (hyperlipidemia).  Plan: continue statin.
Problem/Plan - 1:  ·  Problem: Unstable angina pectoris.  Plan: telemonitor   continue ASA, Plavix, Statin and Metoprolol  awaiting PCI  will monitor    Problem/Plan - 2:  ·  Problem: JOURDAN (acute kidney injury).  Plan: Monitor electrolytes  monitor cr  renal consult appreciated    Problem/Plan - 3:  ·  Problem: HTN .  Plan: DASH diet  Continue bp meds.     Problem/Plan - 4:  ·  Problem: HA Plan Neuro fu
Problem/Plan - 1:  ·  Problem: Unstable angina pectoris.  Plan: telemonitor   continue ASA, Plavix, Statin and Metoprolol  sp staged PCI   will monitor    Problem/Plan - 2:  ·  Problem: JOURDAN (acute kidney injury).  Plan: Monitor electrolytes  monitor cr  renal consult appreciated    Problem/Plan - 3:  ·  Problem: HTN .  Plan: DASH diet  Continue bp meds.     Problem/Plan - 4:  ·  Problem: HA Plan Neuro fu appreciated
Problem/Plan - 1:  ·  Problem: Unstable angina pectoris.  Plan: telemonitor   continue ASA, Plavix, Statin and Metoprolol  staged PCI ingris  will monitor    Problem/Plan - 2:  ·  Problem: JOURDAN (acute kidney injury).  Plan: Monitor electrolytes  monitor cr  renal consult appreciated    Problem/Plan - 3:  ·  Problem: HTN .  Plan: DASH diet  Continue bp meds.     Problem/Plan - 4:  ·  Problem: HA Plan Neuro fu

## 2018-05-10 NOTE — DISCHARGE NOTE ADULT - PROVIDER TOKENS
FREE:[LAST:[CAll your cardiologist for appointment within 2 weeks],PHONE:[(   )    -],FAX:[(   )    -]] FREE:[LAST:[CALL YOUR cardiologist for follow up within 2 w eeks],PHONE:[(   )    -],FAX:[(   )    -]],FREE:[LAST:[Call your PMD for follow up within 2 weeks],PHONE:[(   )    -],FAX:[(   )    -]]

## 2018-05-10 NOTE — PROGRESS NOTE ADULT - SUBJECTIVE AND OBJECTIVE BOX
No pain, no shortness of breath      VITAL:  T(C): , Max: 37 (05-09-18 @ 11:47)  T(F): , Max: 98.6 (05-09-18 @ 11:47)  HR: 82 (05-10-18 @ 07:21)  BP: 104/64 (05-10-18 @ 04:57)  BP(mean): --  RR: 18 (05-10-18 @ 04:57)  SpO2: 100% (05-10-18 @ 04:57)      PHYSICAL EXAM:  Constitutional: NAD, Alert; frail  HEENT: NCAT, DMM  Neck: Supple, No JVD  Respiratory: CTA-b/l  Cardiovascular: RRR s1s2, no m/r/g  Gastrointestinal: BS+, soft, NT/ND  Extremities: No peripheral edema b/l  Neurological: no focal deficits; strength grossly intact  Psychiatric: Normal mood, normal affect  Back: no CVAT b/l  Skin: No rashes, no nevi (+)large tophi, including a seos-vfhm-hewzz lesion of his right elbow      LABS:                        13.2   9.65  )-----------( 197      ( 10 May 2018 07:10 )             40.6     Na(133)/K(4.9)/Cl(103)/HCO3(17)/BUN(27)/Cr(1.43)Glu(92)/Ca(8.9)/Mg(1.9)/PO4(--)    05-10 @ 07:10  Na(137)/K(5.2)/Cl(103)/HCO3(22)/BUN(36)/Cr(1.67)Glu(86)/Ca(9.1)/Mg(2.0)/PO4(--)    05-09 @ 05:59  Na(135)/K(4.6)/Cl(102)/HCO3(19)/BUN(31)/Cr(1.50)Glu(95)/Ca(8.9)/Mg(2.0)/PO4(--)    05-08 @ 06:30      IMPRESSION: 62M w/ HTN, HLD, gout, CKD3, and CAD-CABG, 5/3/18 a/w CP/SOB    (1)Renal - Nonproteinuric CKD3; likely from microvascular disease; the cysts seen on ultrasound likely do not represent any disease process causing renal impairment. Resolving/resolved mild superimposed hemodynamic JOURDAN; no evidence of contrast nephropathy to date, s/p cardiac cath 5/7 and 5/9/18    (2)Chest pain - s/p PCI yesterday    (3)Metabolic acidosis - worse as of today - likely due to administration of NS yesterday periprocedurally. Not requiring treatment for now.        RECOMMEND:  (1)Meds as ordered  (2)No objection to discharge; would repeat BMP early next week; could f/u at my office in 2-4 weeks              Jono Andino MD  [facility]  [phone]

## 2018-05-10 NOTE — PROGRESS NOTE ADULT - SUBJECTIVE AND OBJECTIVE BOX
[facility]        - Patient seen and examined.  - Today, patient is without complaints.         *****MEDICATIONS: Current medication reviewed and documented.    MEDICATIONS  (STANDING):  amLODIPine   Tablet 5 milliGRAM(s) Oral daily  aspirin enteric coated 81 milliGRAM(s) Oral daily  atorvastatin 80 milliGRAM(s) Oral at bedtime  clopidogrel Tablet 75 milliGRAM(s) Oral daily  febuxostat 80 milliGRAM(s) Oral daily  isosorbide   mononitrate ER Tablet (IMDUR) 90 milliGRAM(s) Oral daily  lisinopril 5 milliGRAM(s) Oral daily  metoprolol succinate ER 25 milliGRAM(s) Oral daily  pantoprazole    Tablet 40 milliGRAM(s) Oral before breakfast    MEDICATIONS  (PRN):  acetaminophen   Tablet. 650 milliGRAM(s) Oral every 6 hours PRN mild and moderated headache           ***** REVIEW OF SYSTEM:  GEN: no fever, no chills, no pain  RESP: no SOB, no cough, no sputum  CVS: no chest pain, no palpitations, no edema  GI: no abdominal pain, no nausea, no vomiting, no constipation, no diarrhea  : no dysurea, no frequency  NEURO: no headache, no diziness  PSYCH: no depression, not anxious  Derm : no itching, no rash         ***** VITAL SIGNS:  T(F): 98.8 (05-10-18 @ 12:35), Max: 98.8 (05-10-18 @ 12:35)  HR: 65 (05-10-18 @ 12:35) (65 - 92)  BP: 113/76 (05-10-18 @ 12:35) (104/64 - 121/62)  RR: 16 (05-10-18 @ 12:35) (16 - 18)  SpO2: 100% (05-10-18 @ 12:35) (100% - 100%)  Wt(kg): --  ,   I&O's Summary    09 May 2018 07:01  -  10 May 2018 07:00  --------------------------------------------------------  IN: 320 mL / OUT: 0 mL / NET: 320 mL             *****PHYSICAL EXAM:   alert oriented x 3 attention comprehension are fair.  Able to name, repeat.   EOmi fundi not visualized   no nystagmus VFF to confrontation  Tongue is midline  Palate elevates symmetrically   Moving all 4 ext spontaneously no drift appreciated    Gait not assessed.            *****LAB AND IMAGIN.2   9.65  )-----------( 197      ( 10 May 2018 07:10 )             40.6               05-10    133<L>  |  103  |  27<H>  ----------------------------<  92  4.9   |  17<L>  |  1.43<H>    Ca    8.9      10 May 2018 07:10  Mg     1.9     05-10             CARDIAC MARKERS ( 08 May 2018 21:08 )  x     / < 0.06 ng/mL / 48 u/L / 1.70 ng/mL / x                    [All pertinent recent Imaging/Reports reviewed]           *****A S S E S S M E N T   A N D   P L A N :      62y Male w/ hx of CAD, cabg  PCI 2016 normal nuc 1 month ago at [facility] admitted with recurrent CP s/p lhc and stent to [facility] with renal dysfunction of unknown chronicity  ct head shows dilated ventricles without any clinical symptoms of nph, such as memory loss, magnetic gait or incontinence.  PT with likely congential hydrocephalus,  denies any complications during birth or during development.   PT should follow up with outpt neurologist as this may evolve getting discharged today.      check b12, folate, tsh, rpr wnl  pt headache resolved spont   dizziness resolved.       Thank you for allowing me to participate in the care of this patient. Please do not hesitate to call me if you have any  questions.        ________________  Joanna Pace MD  [facility] ([facility]  [phone]     30 minutes spent on total encounter; more than 50 % of the visit was  spent counseling and or  coordinating care by the attending physician.   At the present time, [facility] does not  provide outpatient followup, best to call the your insurance to find a participating provider.  This was explained to you at the time of the visit. Alternatively, if your insurance allows it, you can follow up with a neurologist  Dr. Maicol Templeton([place]) [phone] or Dr. Rob Tavera ( [place]) [phone]

## 2018-05-10 NOTE — DISCHARGE NOTE ADULT - PATIENT PORTAL LINK FT
You can access the [email or web address], offered by [facility], by registering with the following website: [email or web address]

## 2018-05-10 NOTE — DISCHARGE NOTE ADULT - CARE PROVIDER_API CALL
CAll your cardiologist for appointment within 2 weeks,   Phone: (   )    -  Fax: (   )    - CALL YOUR cardiologist for follow up within 2 w eeks,   Phone: (   )    -  Fax: (   )    -    Call your PMD for follow up within 2 weeks,   Phone: (   )    -  Fax: (   )    -

## 2018-05-10 NOTE — DIETITIAN INITIAL EVALUATION ADULT. - OTHER INFO
Pt seen for Length of stay. Pt 61 yo male appears alert, oriented. Per Pt his appetite usually good; No chew/swallow problem voiced; no nausea/vomiting/diarrhea reported @ present. At home Pt eats Regular food reported. Pt's diet order includes DASH/TLC (cholesterol and sodium restricted) restriction. Prescribed diet discussed with Pt including better food choices, foods to avoid. No food related concerns voiced @ present. RDN remains available, Pt made aware.

## 2018-05-10 NOTE — DISCHARGE NOTE ADULT - HOSPITAL COURSE
This is a 60 yo M admitted with hx of CAD  cardiac stents x 2 in 2016, CABG . Admitted with unstable angina. Patient with JOURDAN and CTH with mechanical fall. Patient  with EKG with NSR and CE negative. Patient with clear lungs Patient  seen by renal . Patient had a cardiac cath with stent to Left anterior descending artery .     Patient also found to have congenital hydrocephalus. Patient was seen by neuro . He is stable for discharge to home on 5/10 as per Dr Grace.

## 2018-05-10 NOTE — PROGRESS NOTE ADULT - SUBJECTIVE AND OBJECTIVE BOX
Patient is a 62y old  Male who presents with a chief complaint of unstable angina (10 May 2018 14:12)      INTERVAL HPI/OVERNIGHT EVENTS:  T(C): 37.1 (05-10-18 @ 12:35), Max: 37.1 (05-10-18 @ 12:35)  HR: 83 (05-10-18 @ 17:39) (65 - 92)  BP: 126/81 (05-10-18 @ 17:39) (104/64 - 126/81)  RR: 14 (05-10-18 @ 17:39) (14 - 18)  SpO2: 100% (05-10-18 @ 17:39) (100% - 100%)  Wt(kg): --  I&O's Summary    09 May 2018 07:01  -  10 May 2018 07:00  --------------------------------------------------------  IN: 320 mL / OUT: 0 mL / NET: 320 mL        LABS:                        13.2   9.65  )-----------( 197      ( 10 May 2018 07:10 )             40.6     05-10    133<L>  |  103  |  27<H>  ----------------------------<  92  4.9   |  17<L>  |  1.43<H>    Ca    8.9      10 May 2018 07:10  Mg     1.9     05-10          CAPILLARY BLOOD GLUCOSE                MEDICATIONS  (STANDING):  amLODIPine   Tablet 5 milliGRAM(s) Oral daily  aspirin enteric coated 81 milliGRAM(s) Oral daily  atorvastatin 80 milliGRAM(s) Oral at bedtime  clopidogrel Tablet 75 milliGRAM(s) Oral daily  febuxostat 80 milliGRAM(s) Oral daily  isosorbide   mononitrate ER Tablet (IMDUR) 90 milliGRAM(s) Oral daily  lisinopril 5 milliGRAM(s) Oral daily  metoprolol succinate ER 25 milliGRAM(s) Oral daily  pantoprazole    Tablet 40 milliGRAM(s) Oral before breakfast    MEDICATIONS  (PRN):  acetaminophen   Tablet. 650 milliGRAM(s) Oral every 6 hours PRN mild and moderated headache          PHYSICAL EXAM:  GENERAL: NAD, well-groomed, well-developed  HEAD:  Atraumatic, Normocephalic  CHEST/LUNG: Clear to percussion bilaterally; No rales, rhonchi, wheezing, or rubs  HEART: Regular rate and rhythm; No murmurs, rubs, or gallops  ABDOMEN: Soft, Nontender, Nondistended; Bowel sounds present  EXTREMITIES:  2+ Peripheral Pulses, No clubbing, cyanosis, or edema  LYMPH: No lymphadenopathy noted  SKIN: No rashes or lesions    Care Discussed with Consultants/Other Providers [+ ] YES  [ ] NO

## 2018-05-10 NOTE — PROGRESS NOTE ADULT - SUBJECTIVE AND OBJECTIVE BOX
Subjective: No CP or SOB              MEDICATIONS  (STANDING):  amLODIPine   Tablet 5 milliGRAM(s) Oral daily  aspirin enteric coated 81 milliGRAM(s) Oral daily  atorvastatin 80 milliGRAM(s) Oral at bedtime  clopidogrel Tablet 75 milliGRAM(s) Oral daily  febuxostat 80 milliGRAM(s) Oral daily  isosorbide   mononitrate ER Tablet (IMDUR) 90 milliGRAM(s) Oral daily  lisinopril 5 milliGRAM(s) Oral daily  metoprolol succinate ER 25 milliGRAM(s) Oral daily  pantoprazole    Tablet 40 milliGRAM(s) Oral before breakfast    MEDICATIONS  (PRN):  acetaminophen   Tablet. 650 milliGRAM(s) Oral every 6 hours PRN mild and moderated headache      LABS:                        13.2   9.65  )-----------( 197      ( 10 May 2018 07:10 )             40.6     133<L>  |  103  |  27<H>  ----------------------------<  92  4.9   |  17<L>  |  1.43<H>    Ca    8.9      10 May 2018 07:10  Mg     1.9     05-10    Creatinine Trend: 1.43<--, 1.67<--, 1.50<--, 1.55<--, 1.68<--, 1.85<--     CARDIAC MARKERS ( 08 May 2018 21:08 )  x     / < 0.06 ng/mL / 48 u/L / 1.70 ng/mL / x        PHYSICAL EXAM  Vital Signs Last 24 Hrs  T(C): 36.3 (10 May 2018 04:57), Max: 37 (09 May 2018 11:47)  T(F): 97.4 (10 May 2018 04:57), Max: 98.6 (09 May 2018 11:47)  HR: 82 (10 May 2018 07:21) (71 - 92)  BP: 104/64 (10 May 2018 04:57) (104/64 - 121/62)  RR: 18 (10 May 2018 04:57) (17 - 18)  SpO2: 100% (10 May 2018 04:57) (100% - 100%)    Cardiovascular:  S1S2 RRR, No JVD  Respiratory: Lungs clear to auscultation, normal effort  Gastrointestinal: Abdomen soft, ND, NT, +BS  Skin: Warm, dry, intact. No rash.  Musculoskeletal: Normal ROM, normal strength  Ext: No C/C/E B/L LE    DIAGNOSTIC DATA  TELEMETRY: NSR    < from: CT Head No Cont (05.03.18 @ 12:44) >  IMPRESSION:    No evidence for calvarial fracture or acute intracranial hemorrhage. If   the patient has new and persistent symptoms, consider short interval   follow-up head CT or brain MRI follow-up if there are no MRI   contraindications.    Moderate dilatation of the lateral and third ventricles is noted as   described. Some considerations include chronic hydrocephalus, normal   pressure hydrocephalus, or central greater than cortical atrophy.    < end of copied text >      ASSESSMENT/PLAN: 	62 year old Male w/ hx of CAD s/p CABG in 2015, s/p  PCI 2016, with a normal NST reported 1 month ago at [facility] clinic, who is admitted with recurrent CP    --ACS ruled out with serial CE  --s/p LHC, and staged PCI to  5-9-18  --s/p head CT with moderate dilation of lateral and third ventricles.  Neuro consult appreciated.  Head CT reviewed and PT with likely congential hydrocephalus[place], denies any complications during birth or during development. Needs OP f/u with a Neurologist  -OP Renal f/u for BMP next week  -DC planning today    Tiarra Dumont PA-C

## 2018-05-10 NOTE — DISCHARGE NOTE ADULT - MEDICATION SUMMARY - MEDICATIONS TO TAKE
I will START or STAY ON the medications listed below when I get home from the hospital:    Ecotrin Adult Low Strength 81 mg oral delayed release tablet  -- 1 tab(s) by mouth once a day  -- Indication: For Heart    lisinopril 5 mg oral tablet  -- 1 tab(s) by mouth once a day  -- Indication: For HTN (hypertension)    isosorbide mononitrate 30 mg oral tablet, extended release  -- 3 tab(s) by mouth once a day (in the morning)  -- Indication: For HTN (hypertension)    atorvastatin 80 mg oral tablet  -- 1 tab(s) by mouth once a day (at bedtime)  -- Indication: For Cholesterol    Uloric 80 mg oral tablet  -- 1 tab(s) by mouth once a day  -- Indication: For Uric acid    Plavix 75 mg oral tablet  -- 1 tab(s) by mouth once a day  -- Indication: For Heart    Metoprolol Succinate ER 25 mg oral tablet, extended release  -- 1 tab(s) by mouth once a day  -- Indication: For HTN (hypertension)    amLODIPine 5 mg oral tablet  -- 1 tab(s) by mouth once a day  -- Indication: For HTN (hypertension)

## 2018-05-10 NOTE — DIETITIAN INITIAL EVALUATION ADULT. - NS AS NUTRI INTERV MEALS SNACK
Diets modified for specific foods and ingredients/Other (specify)/1. Suggest: PO diet rx: Regular, DASH/TLC (cholesterol and sodium restricted);                 2. Encourage & assist Pt with meals; Monitor PO diet tolerance;              3. Monitor labs, weights, hydration status;

## 2018-05-10 NOTE — DISCHARGE NOTE ADULT - CARE PLAN
Principal Discharge DX:	Unstable angina pectoris  Goal:	SP CARDIAC CATH  Assessment and plan of treatment:	s/p cath with  stent to Left anterior descending artery

## 2018-05-11 ENCOUNTER — INPATIENT (INPATIENT)
Facility: HOSPITAL | Age: 63
LOS: 4 days | Discharge: HOME HEALTH | End: 2018-05-16
Attending: INTERNAL MEDICINE | Admitting: INTERNAL MEDICINE
Payer: MEDICAID

## 2018-05-11 VITALS
SYSTOLIC BLOOD PRESSURE: 105 MMHG | RESPIRATION RATE: 18 BRPM | TEMPERATURE: 98 F | DIASTOLIC BLOOD PRESSURE: 60 MMHG | HEART RATE: 78 BPM | OXYGEN SATURATION: 99 %

## 2018-05-11 DIAGNOSIS — Z95.5 PRESENCE OF CORONARY ANGIOPLASTY IMPLANT AND GRAFT: Chronic | ICD-10-CM

## 2018-05-11 DIAGNOSIS — Z95.1 PRESENCE OF AORTOCORONARY BYPASS GRAFT: Chronic | ICD-10-CM

## 2018-05-11 DIAGNOSIS — R07.9 CHEST PAIN, UNSPECIFIED: ICD-10-CM

## 2018-05-11 LAB
ALBUMIN SERPL ELPH-MCNC: 4.1 G/DL — SIGNIFICANT CHANGE UP (ref 3.3–5)
ALP SERPL-CCNC: 82 U/L — SIGNIFICANT CHANGE UP (ref 40–120)
ALT FLD-CCNC: 14 U/L — SIGNIFICANT CHANGE UP (ref 4–41)
APTT BLD: 27 SEC — LOW (ref 27.5–37.4)
AST SERPL-CCNC: 19 U/L — SIGNIFICANT CHANGE UP (ref 4–40)
BASE EXCESS BLDV CALC-SCNC: -3.7 MMOL/L — SIGNIFICANT CHANGE UP
BASOPHILS # BLD AUTO: 0.06 K/UL — SIGNIFICANT CHANGE UP (ref 0–0.2)
BASOPHILS NFR BLD AUTO: 0.7 % — SIGNIFICANT CHANGE UP (ref 0–2)
BILIRUB SERPL-MCNC: 0.5 MG/DL — SIGNIFICANT CHANGE UP (ref 0.2–1.2)
BLOOD GAS VENOUS - CREATININE: 1.96 MG/DL — HIGH (ref 0.5–1.3)
BUN SERPL-MCNC: 27 MG/DL — HIGH (ref 7–23)
CALCIUM SERPL-MCNC: 9 MG/DL — SIGNIFICANT CHANGE UP (ref 8.4–10.5)
CHLORIDE BLDV-SCNC: 108 MMOL/L — SIGNIFICANT CHANGE UP (ref 96–108)
CHLORIDE SERPL-SCNC: 100 MMOL/L — SIGNIFICANT CHANGE UP (ref 98–107)
CO2 SERPL-SCNC: 20 MMOL/L — LOW (ref 22–31)
CREAT SERPL-MCNC: 1.95 MG/DL — HIGH (ref 0.5–1.3)
EOSINOPHIL # BLD AUTO: 0.64 K/UL — HIGH (ref 0–0.5)
EOSINOPHIL NFR BLD AUTO: 7.3 % — HIGH (ref 0–6)
GAS PNL BLDV: 131 MMOL/L — LOW (ref 136–146)
GLUCOSE BLDV-MCNC: 124 — HIGH (ref 70–99)
GLUCOSE SERPL-MCNC: 115 MG/DL — HIGH (ref 70–99)
HCO3 BLDV-SCNC: 21 MMOL/L — SIGNIFICANT CHANGE UP (ref 20–27)
HCT VFR BLD CALC: 38.8 % — LOW (ref 39–50)
HCT VFR BLDV CALC: 40 % — SIGNIFICANT CHANGE UP (ref 39–51)
HGB BLD-MCNC: 12.4 G/DL — LOW (ref 13–17)
HGB BLDV-MCNC: 13 G/DL — SIGNIFICANT CHANGE UP (ref 13–17)
IMM GRANULOCYTES # BLD AUTO: 0.07 # — SIGNIFICANT CHANGE UP
IMM GRANULOCYTES NFR BLD AUTO: 0.8 % — SIGNIFICANT CHANGE UP (ref 0–1.5)
INR BLD: 1.01 — SIGNIFICANT CHANGE UP (ref 0.88–1.17)
LACTATE BLDV-MCNC: 1.4 MMOL/L — SIGNIFICANT CHANGE UP (ref 0.5–2)
LYMPHOCYTES # BLD AUTO: 1.38 K/UL — SIGNIFICANT CHANGE UP (ref 1–3.3)
LYMPHOCYTES # BLD AUTO: 15.7 % — SIGNIFICANT CHANGE UP (ref 13–44)
MCHC RBC-ENTMCNC: 28 PG — SIGNIFICANT CHANGE UP (ref 27–34)
MCHC RBC-ENTMCNC: 32 % — SIGNIFICANT CHANGE UP (ref 32–36)
MCV RBC AUTO: 87.6 FL — SIGNIFICANT CHANGE UP (ref 80–100)
MONOCYTES # BLD AUTO: 0.68 K/UL — SIGNIFICANT CHANGE UP (ref 0–0.9)
MONOCYTES NFR BLD AUTO: 7.7 % — SIGNIFICANT CHANGE UP (ref 2–14)
NEUTROPHILS # BLD AUTO: 5.98 K/UL — SIGNIFICANT CHANGE UP (ref 1.8–7.4)
NEUTROPHILS NFR BLD AUTO: 67.8 % — SIGNIFICANT CHANGE UP (ref 43–77)
NRBC # FLD: 0 — SIGNIFICANT CHANGE UP
PCO2 BLDV: 42 MMHG — SIGNIFICANT CHANGE UP (ref 41–51)
PH BLDV: 7.32 PH — SIGNIFICANT CHANGE UP (ref 7.32–7.43)
PLATELET # BLD AUTO: 197 K/UL — SIGNIFICANT CHANGE UP (ref 150–400)
PMV BLD: 9.7 FL — SIGNIFICANT CHANGE UP (ref 7–13)
PO2 BLDV: 41 MMHG — HIGH (ref 35–40)
POTASSIUM BLDV-SCNC: 5.2 MMOL/L — HIGH (ref 3.4–4.5)
POTASSIUM SERPL-MCNC: 5.5 MMOL/L — HIGH (ref 3.5–5.3)
POTASSIUM SERPL-SCNC: 5.5 MMOL/L — HIGH (ref 3.5–5.3)
PROT SERPL-MCNC: 7.2 G/DL — SIGNIFICANT CHANGE UP (ref 6–8.3)
PROTHROM AB SERPL-ACNC: 11.6 SEC — SIGNIFICANT CHANGE UP (ref 9.8–13.1)
RBC # BLD: 4.43 M/UL — SIGNIFICANT CHANGE UP (ref 4.2–5.8)
RBC # FLD: 14 % — SIGNIFICANT CHANGE UP (ref 10.3–14.5)
SAO2 % BLDV: 66.6 % — SIGNIFICANT CHANGE UP (ref 60–85)
SODIUM SERPL-SCNC: 134 MMOL/L — LOW (ref 135–145)
TROPONIN T SERPL-MCNC: < 0.06 NG/ML — SIGNIFICANT CHANGE UP (ref 0–0.06)
WBC # BLD: 8.81 K/UL — SIGNIFICANT CHANGE UP (ref 3.8–10.5)
WBC # FLD AUTO: 8.81 K/UL — SIGNIFICANT CHANGE UP (ref 3.8–10.5)

## 2018-05-11 RX ORDER — HEPARIN SODIUM 5000 [USP'U]/ML
INJECTION INTRAVENOUS; SUBCUTANEOUS
Qty: 25000 | Refills: 0 | Status: DISCONTINUED | OUTPATIENT
Start: 2018-05-11 | End: 2018-05-13

## 2018-05-11 RX ORDER — HEPARIN SODIUM 5000 [USP'U]/ML
3800 INJECTION INTRAVENOUS; SUBCUTANEOUS ONCE
Qty: 0 | Refills: 0 | Status: COMPLETED | OUTPATIENT
Start: 2018-05-11 | End: 2018-05-12

## 2018-05-11 RX ORDER — HEPARIN SODIUM 5000 [USP'U]/ML
3800 INJECTION INTRAVENOUS; SUBCUTANEOUS EVERY 6 HOURS
Qty: 0 | Refills: 0 | Status: DISCONTINUED | OUTPATIENT
Start: 2018-05-11 | End: 2018-05-13

## 2018-05-11 RX ORDER — ONDANSETRON 8 MG/1
4 TABLET, FILM COATED ORAL ONCE
Qty: 0 | Refills: 0 | Status: COMPLETED | OUTPATIENT
Start: 2018-05-11 | End: 2018-05-11

## 2018-05-11 RX ORDER — MORPHINE SULFATE 50 MG/1
4 CAPSULE, EXTENDED RELEASE ORAL ONCE
Qty: 0 | Refills: 0 | Status: DISCONTINUED | OUTPATIENT
Start: 2018-05-11 | End: 2018-05-11

## 2018-05-11 RX ADMIN — MORPHINE SULFATE 4 MILLIGRAM(S): 50 CAPSULE, EXTENDED RELEASE ORAL at 23:50

## 2018-05-11 RX ADMIN — ONDANSETRON 4 MILLIGRAM(S): 8 TABLET, FILM COATED ORAL at 23:51

## 2018-05-11 NOTE — ED PROVIDER NOTE - OBJECTIVE STATEMENT
62m w CAD s/p CABG and stents, discharged yesterday after adm for unstable angina, cathed and stented 2 days ago in LAD, c/o constant left-sided cp radiating down left arm for past 8 hrs along with nausea. Took aspirin and plavix today. No SOB. No fever or cough.

## 2018-05-11 NOTE — ED PROVIDER NOTE - MEDICAL DECISION MAKING DETAILS
chest pain, unstable angina- heparin, pain control admit. 62m w significant cad hx, incl recent cath, w severe cp. High susp for ACS - labs, monitor, pain ctrl, adm

## 2018-05-11 NOTE — ED PROVIDER NOTE - PROGRESS NOTE DETAILS
Elizabeth Goldberger PGY-1: paged cardiology, awaiting call back Elizabeth Goldberger PGY-1: trop nt yet resulted but spoke to cards, suggesting starting fulldose heparin including bolus, adm to cards. CCU consult if pain does not improve w morphine Elizabeth Goldberger PGY-1: pt has already been admitted to cardiology. Still c/o 6/10 cp even after morphine, so called for ccu eval

## 2018-05-11 NOTE — ED ADULT NURSE NOTE - OBJECTIVE STATEMENT
Presents to ED complaining of constant chest pain since 2pm with nausea or vomiting.  Patient was discharged from the hospital yesterday after having a stent placed.  He states, the pain is different than last time.  Denies SOB or dizziness.  20g IV right AC, labs drawn and sent, vitals taken, connected to the cardiac monitor at sinus rhythm and will continue to monitor patient.

## 2018-05-11 NOTE — ED PROVIDER NOTE - ATTENDING CONTRIBUTION TO CARE
DR. BLOCH, ATTENDING MD-  I performed a face to face bedside interview with patient regarding history of present illness, review of symptoms and past medical history. I completed an independent physical exam.  I have discussed patient's plan of care with the resident.  Patient examined uncomfortable, heent nml lungs clear heart sounds nml abd soft nontender, ext no edema, pulses present.

## 2018-05-11 NOTE — ED ADULT TRIAGE NOTE - CHIEF COMPLAINT QUOTE
Ambulatory s/p discharge yesterday complaining of CP that radiates down his L arm/leg without relief since his DC, admitted with "blockage" and one stent placed. 3 years ago had triple bypass with 2 stents placed. PMH hyperlipidemia and HTN. Now states he has nausea and actively retching in triage. No diaphoresis. Charge RN aware. EKG in process.

## 2018-05-12 DIAGNOSIS — I10 ESSENTIAL (PRIMARY) HYPERTENSION: ICD-10-CM

## 2018-05-12 DIAGNOSIS — I25.10 ATHEROSCLEROTIC HEART DISEASE OF NATIVE CORONARY ARTERY WITHOUT ANGINA PECTORIS: ICD-10-CM

## 2018-05-12 DIAGNOSIS — M1A.0290: ICD-10-CM

## 2018-05-12 DIAGNOSIS — Z29.9 ENCOUNTER FOR PROPHYLACTIC MEASURES, UNSPECIFIED: ICD-10-CM

## 2018-05-12 DIAGNOSIS — I20.9 ANGINA PECTORIS, UNSPECIFIED: ICD-10-CM

## 2018-05-12 DIAGNOSIS — I20.0 UNSTABLE ANGINA: ICD-10-CM

## 2018-05-12 DIAGNOSIS — E78.5 HYPERLIPIDEMIA, UNSPECIFIED: ICD-10-CM

## 2018-05-12 LAB
APTT BLD: 56 SEC — HIGH (ref 27.5–37.4)
APTT BLD: 70 SEC — HIGH (ref 27.5–37.4)
APTT BLD: 74.8 SEC — HIGH (ref 27.5–37.4)
BUN SERPL-MCNC: 27 MG/DL — HIGH (ref 7–23)
CALCIUM SERPL-MCNC: 8.9 MG/DL — SIGNIFICANT CHANGE UP (ref 8.4–10.5)
CHLORIDE SERPL-SCNC: 101 MMOL/L — SIGNIFICANT CHANGE UP (ref 98–107)
CHOLEST SERPL-MCNC: 111 MG/DL — LOW (ref 120–199)
CK MB BLD-MCNC: 3.68 NG/ML — SIGNIFICANT CHANGE UP (ref 1–6.6)
CK SERPL-CCNC: 84 U/L — SIGNIFICANT CHANGE UP (ref 30–200)
CO2 SERPL-SCNC: 21 MMOL/L — LOW (ref 22–31)
CREAT SERPL-MCNC: 1.87 MG/DL — HIGH (ref 0.5–1.3)
GLUCOSE SERPL-MCNC: 90 MG/DL — SIGNIFICANT CHANGE UP (ref 70–99)
HBA1C BLD-MCNC: 6.1 % — HIGH (ref 4–5.6)
HCT VFR BLD CALC: 40.2 % — SIGNIFICANT CHANGE UP (ref 39–50)
HDLC SERPL-MCNC: 45 MG/DL — SIGNIFICANT CHANGE UP (ref 35–55)
HGB BLD-MCNC: 12.6 G/DL — LOW (ref 13–17)
LIDOCAIN IGE QN: 52.5 U/L — SIGNIFICANT CHANGE UP (ref 7–60)
LIPID PNL WITH DIRECT LDL SERPL: 61 MG/DL — SIGNIFICANT CHANGE UP
MAGNESIUM SERPL-MCNC: 1.9 MG/DL — SIGNIFICANT CHANGE UP (ref 1.6–2.6)
MCHC RBC-ENTMCNC: 27.9 PG — SIGNIFICANT CHANGE UP (ref 27–34)
MCHC RBC-ENTMCNC: 31.3 % — LOW (ref 32–36)
MCV RBC AUTO: 88.9 FL — SIGNIFICANT CHANGE UP (ref 80–100)
NRBC # FLD: 0 — SIGNIFICANT CHANGE UP
PHOSPHATE SERPL-MCNC: 3.1 MG/DL — SIGNIFICANT CHANGE UP (ref 2.5–4.5)
PLATELET # BLD AUTO: 208 K/UL — SIGNIFICANT CHANGE UP (ref 150–400)
PMV BLD: 10.1 FL — SIGNIFICANT CHANGE UP (ref 7–13)
POTASSIUM SERPL-MCNC: 5.7 MMOL/L — HIGH (ref 3.5–5.3)
POTASSIUM SERPL-SCNC: 5.7 MMOL/L — HIGH (ref 3.5–5.3)
RBC # BLD: 4.52 M/UL — SIGNIFICANT CHANGE UP (ref 4.2–5.8)
RBC # FLD: 14.2 % — SIGNIFICANT CHANGE UP (ref 10.3–14.5)
SODIUM SERPL-SCNC: 134 MMOL/L — LOW (ref 135–145)
TRIGL SERPL-MCNC: 61 MG/DL — SIGNIFICANT CHANGE UP (ref 10–149)
TROPONIN T SERPL-MCNC: < 0.06 NG/ML — SIGNIFICANT CHANGE UP (ref 0–0.06)
TSH SERPL-MCNC: 1.57 UIU/ML — SIGNIFICANT CHANGE UP (ref 0.27–4.2)
WBC # BLD: 8.13 K/UL — SIGNIFICANT CHANGE UP (ref 3.8–10.5)
WBC # FLD AUTO: 8.13 K/UL — SIGNIFICANT CHANGE UP (ref 3.8–10.5)

## 2018-05-12 PROCEDURE — 71045 X-RAY EXAM CHEST 1 VIEW: CPT | Mod: 26

## 2018-05-12 PROCEDURE — 93010 ELECTROCARDIOGRAM REPORT: CPT

## 2018-05-12 RX ORDER — SODIUM POLYSTYRENE SULFONATE 4.1 MEQ/G
30 POWDER, FOR SUSPENSION ORAL ONCE
Qty: 0 | Refills: 0 | Status: COMPLETED | OUTPATIENT
Start: 2018-05-12 | End: 2018-05-12

## 2018-05-12 RX ORDER — TRAMADOL HYDROCHLORIDE 50 MG/1
25 TABLET ORAL ONCE
Qty: 0 | Refills: 0 | Status: DISCONTINUED | OUTPATIENT
Start: 2018-05-12 | End: 2018-05-12

## 2018-05-12 RX ORDER — AMLODIPINE BESYLATE 2.5 MG/1
5 TABLET ORAL DAILY
Qty: 0 | Refills: 0 | Status: DISCONTINUED | OUTPATIENT
Start: 2018-05-12 | End: 2018-05-16

## 2018-05-12 RX ORDER — METOPROLOL TARTRATE 50 MG
25 TABLET ORAL DAILY
Qty: 0 | Refills: 0 | Status: DISCONTINUED | OUTPATIENT
Start: 2018-05-12 | End: 2018-05-16

## 2018-05-12 RX ORDER — FEBUXOSTAT 40 MG/1
80 TABLET ORAL DAILY
Qty: 0 | Refills: 0 | Status: DISCONTINUED | OUTPATIENT
Start: 2018-05-12 | End: 2018-05-16

## 2018-05-12 RX ORDER — CLOPIDOGREL BISULFATE 75 MG/1
75 TABLET, FILM COATED ORAL DAILY
Qty: 0 | Refills: 0 | Status: DISCONTINUED | OUTPATIENT
Start: 2018-05-12 | End: 2018-05-16

## 2018-05-12 RX ORDER — SODIUM POLYSTYRENE SULFONATE 4.1 MEQ/G
15 POWDER, FOR SUSPENSION ORAL ONCE
Qty: 0 | Refills: 0 | Status: COMPLETED | OUTPATIENT
Start: 2018-05-12 | End: 2018-05-12

## 2018-05-12 RX ORDER — ISOSORBIDE MONONITRATE 60 MG/1
30 TABLET, EXTENDED RELEASE ORAL DAILY
Qty: 0 | Refills: 0 | Status: DISCONTINUED | OUTPATIENT
Start: 2018-05-12 | End: 2018-05-16

## 2018-05-12 RX ORDER — ATORVASTATIN CALCIUM 80 MG/1
80 TABLET, FILM COATED ORAL AT BEDTIME
Qty: 0 | Refills: 0 | Status: DISCONTINUED | OUTPATIENT
Start: 2018-05-12 | End: 2018-05-16

## 2018-05-12 RX ORDER — ASPIRIN/CALCIUM CARB/MAGNESIUM 324 MG
81 TABLET ORAL DAILY
Qty: 0 | Refills: 0 | Status: DISCONTINUED | OUTPATIENT
Start: 2018-05-12 | End: 2018-05-16

## 2018-05-12 RX ORDER — LANOLIN ALCOHOL/MO/W.PET/CERES
3 CREAM (GRAM) TOPICAL AT BEDTIME
Qty: 0 | Refills: 0 | Status: DISCONTINUED | OUTPATIENT
Start: 2018-05-12 | End: 2018-05-16

## 2018-05-12 RX ADMIN — AMLODIPINE BESYLATE 5 MILLIGRAM(S): 2.5 TABLET ORAL at 05:18

## 2018-05-12 RX ADMIN — HEPARIN SODIUM 750 UNIT(S)/HR: 5000 INJECTION INTRAVENOUS; SUBCUTANEOUS at 08:23

## 2018-05-12 RX ADMIN — ATORVASTATIN CALCIUM 80 MILLIGRAM(S): 80 TABLET, FILM COATED ORAL at 21:46

## 2018-05-12 RX ADMIN — TRAMADOL HYDROCHLORIDE 25 MILLIGRAM(S): 50 TABLET ORAL at 17:48

## 2018-05-12 RX ADMIN — TRAMADOL HYDROCHLORIDE 25 MILLIGRAM(S): 50 TABLET ORAL at 01:46

## 2018-05-12 RX ADMIN — CLOPIDOGREL BISULFATE 75 MILLIGRAM(S): 75 TABLET, FILM COATED ORAL at 11:01

## 2018-05-12 RX ADMIN — Medication 81 MILLIGRAM(S): at 11:01

## 2018-05-12 RX ADMIN — TRAMADOL HYDROCHLORIDE 25 MILLIGRAM(S): 50 TABLET ORAL at 17:18

## 2018-05-12 RX ADMIN — Medication 25 MILLIGRAM(S): at 05:18

## 2018-05-12 RX ADMIN — TRAMADOL HYDROCHLORIDE 25 MILLIGRAM(S): 50 TABLET ORAL at 02:36

## 2018-05-12 RX ADMIN — SODIUM POLYSTYRENE SULFONATE 15 GRAM(S): 4.1 POWDER, FOR SUSPENSION ORAL at 16:51

## 2018-05-12 RX ADMIN — Medication 3 MILLIGRAM(S): at 22:25

## 2018-05-12 RX ADMIN — HEPARIN SODIUM 700 UNIT(S)/HR: 5000 INJECTION INTRAVENOUS; SUBCUTANEOUS at 15:08

## 2018-05-12 RX ADMIN — HEPARIN SODIUM 3800 UNIT(S): 5000 INJECTION INTRAVENOUS; SUBCUTANEOUS at 00:20

## 2018-05-12 RX ADMIN — FEBUXOSTAT 80 MILLIGRAM(S): 40 TABLET ORAL at 11:01

## 2018-05-12 RX ADMIN — HEPARIN SODIUM 750 UNIT(S)/HR: 5000 INJECTION INTRAVENOUS; SUBCUTANEOUS at 00:20

## 2018-05-12 RX ADMIN — HEPARIN SODIUM 700 UNIT(S)/HR: 5000 INJECTION INTRAVENOUS; SUBCUTANEOUS at 22:24

## 2018-05-12 RX ADMIN — ISOSORBIDE MONONITRATE 30 MILLIGRAM(S): 60 TABLET, EXTENDED RELEASE ORAL at 11:01

## 2018-05-12 NOTE — CONSULT NOTE ADULT - SUBJECTIVE AND OBJECTIVE BOX
HPI  This is a 62yoM w/ PMHx CAD s/p CABG and stents (most recently 2 days ago to mLAD) c/o left sided chest pain with radiation to left arm, back and down left sided of abd and leg a/w nausea.  States he has been taking his ASA and plavix, not missed a dose.  Upon examination, chest pain is reproducible.      PAST MEDICAL & SURGICAL HISTORY:  Chronic gout of elbow, unspecified cause, unspecified laterality  HLD (hyperlipidemia)  CAD (coronary artery disease)  HTN (hypertension)  S/P CABG x 3: 2015  H/O heart artery stent: 2016        amLODIPine   Tablet 5 milliGRAM(s) Oral daily  aspirin enteric coated 81 milliGRAM(s) Oral daily  atorvastatin 80 milliGRAM(s) Oral at bedtime  clopidogrel Tablet 75 milliGRAM(s) Oral daily  febuxostat 80 milliGRAM(s) Oral daily  heparin  Infusion.  Unit(s)/Hr IV Continuous <Continuous>  heparin  Injectable 3800 Unit(s) IV Push every 6 hours PRN  isosorbide   mononitrate ER Tablet (IMDUR) 30 milliGRAM(s) Oral daily  metoprolol succinate ER 25 milliGRAM(s) Oral daily    No Known Allergies    Objective:  T(C): 36.7 (05-12-18 @ 01:39), Max: 36.7 (05-12-18 @ 00:21)  HR: 80 (05-12-18 @ 01:39) (65 - 80)  BP: 107/56 (05-12-18 @ 01:39) (103/69 - 107/56)  RR: 16 (05-12-18 @ 01:39) (16 - 18)  SpO2: 100% (05-12-18 @ 01:39) (99% - 100%)  Wt(kg): --  CM:   General: Awake, alert, no acute distress.  Neuro: A&OX3  Chest: CTA, S1, S2, no murmur, RRR  Abdomen: Soft  Groin: No bleeding, no hematoma  Ext: + distal pulses    EKG: NSR, no ischemic changes    Labs:                         12.4   8.81  )-----------( 197      ( 11 May 2018 23:00 )             38.8     05-11    134<L>  |  100  |  27<H>  ----------------------------<  115<H>  5.5<H>   |  20<L>  |  1.95<H>    Ca    9.0      11 May 2018 23:00  Mg     1.9     05-10    TPro  7.2  /  Alb  4.1  /  TBili  0.5  /  DBili  x   /  AST  19  /  ALT  14  /  AlkPhos  82  05-11    PT/INR - ( 11 May 2018 23:00 )   PT: 11.6 SEC;   INR: 1.01          PTT - ( 11 May 2018 23:00 )  PTT:27.0 SEC  Cardiac Enzymes  Troponin T, Serum: < 0.06 ng/mL [0.00 - 0.06] (05-11 @ 23:00)

## 2018-05-12 NOTE — H&P ADULT - NSHPLABSRESULTS_GEN_ALL_CORE
LABS:                        12.4   8.81  )-----------( 197      ( 11 May 2018 23:00 )             38.8     05-11    134<L>  |  100  |  27<H>  ----------------------------<  115<H>  5.5<H>   |  20<L>  |  1.95<H>    Ca    9.0      11 May 2018 23:00  Mg     1.9     05-10    TPro  7.2  /  Alb  4.1  /  TBili  0.5  /  DBili  x   /  AST  19  /  ALT  14  /  AlkPhos  82  05-11    PT/INR - ( 11 May 2018 23:00 )   PT: 11.6 SEC;   INR: 1.01          PTT - ( 11 May 2018 23:00 )  PTT:27.0 SEC    CAPILLARY BLOOD GLUCOSE    EKG shows NSR @ 73 bpm  ms unchanged from prior.

## 2018-05-12 NOTE — CONSULT NOTE ADULT - PROBLEM SELECTOR RECOMMENDATION 9
chest pain is atypical and reproducible, enzymes negative, and EKG unchanged from previous  -unlikely ACS  -continue to trend cardiac enzymes chest pain is atypical and reproducible, enzymes negative, and EKG unchanged from previous  -unlikely ACS  -continue to trend cardiac enzymes  -is not candidate for CCU admission at this time

## 2018-05-12 NOTE — H&P ADULT - PROBLEM SELECTOR PLAN 1
Admit to telemetry.   Trend CE. EKG PRN chest pain.   Continue with heparin gtt.   Check cbc,tsh,lipid, hemoglobin a1c, bmp with mag and phos.   f/u MD note

## 2018-05-12 NOTE — H&P ADULT - ATTENDING COMMENTS
CARDIOLOGY ATTENDING    Patient seen and examined. Agree with above. 62 year old Male w/ hx of CAD s/p CABG in 2015, s/p  PCI 2016, who just had a prox LAD LEE ANN 3 days ago now readmitted with recurrent chest pain. EKG without any changes, and cardiac markers negative.    -get VQ scan  -continue aspirin and plavix  -d/c heparin drip if VQ scan unremarkable

## 2018-05-12 NOTE — CONSULT NOTE ADULT - ASSESSMENT
62yoM w/ PMHx CAD s/p CABG and stents (most recently 2 days ago to mLAD) c/o left sided chest pain with radiation to left arm, back and down left sided of abd and leg a/w nausea.  States he has been taking his ASA and plavix, not missed a dose.  Upon examination, chest pain is reproducible.

## 2018-05-12 NOTE — H&P ADULT - NSHPPHYSICALEXAM_GEN_ALL_CORE
GENERAL APPEARANCE: Well developed, well nourished, alert and cooperative, and appears to be in no acute distress.  HEAD: normocephalic.  EYES: PERRL, EOMI.   EARS: External auditory canals clear, hearing grossly intact.  NECK: Neck supple, non-tender without lymphadenopathy, masses or thyromegaly.  CARDIAC: Normal S1 and S2. No S3, S4 or murmurs. Rhythm is regular. + TTP to the left side of the chest.    LUNGS: Clear to auscultation and percussion without rales, rhonchi, wheezing or diminished breath sounds.  ABDOMEN: Positive bowel sounds. Soft, nondistended, nontender. No guarding or rebound. No masses.  EXTREMITIES: No significant deformity or joint abnormality. No edema. Peripheral pulses intact.   SKIN: Skin normal color, texture and turgor with no lesions or eruptions.  PSYCHIATRIC: The mental examination revealed the patient was oriented to person, place, and time. The patient was able to demonstrate good judgement and reason, without hallucinations, abnormal affect or abnormal behaviors during the examination. Patient is not suicidal.

## 2018-05-13 LAB
APTT BLD: 72.1 SEC — HIGH (ref 27.5–37.4)
BUN SERPL-MCNC: 22 MG/DL — SIGNIFICANT CHANGE UP (ref 7–23)
CALCIUM SERPL-MCNC: 8.9 MG/DL — SIGNIFICANT CHANGE UP (ref 8.4–10.5)
CHLORIDE SERPL-SCNC: 104 MMOL/L — SIGNIFICANT CHANGE UP (ref 98–107)
CO2 SERPL-SCNC: 18 MMOL/L — LOW (ref 22–31)
CREAT SERPL-MCNC: 1.79 MG/DL — HIGH (ref 0.5–1.3)
GLUCOSE SERPL-MCNC: 90 MG/DL — SIGNIFICANT CHANGE UP (ref 70–99)
HCT VFR BLD CALC: 40.2 % — SIGNIFICANT CHANGE UP (ref 39–50)
HGB BLD-MCNC: 12.6 G/DL — LOW (ref 13–17)
MAGNESIUM SERPL-MCNC: 1.9 MG/DL — SIGNIFICANT CHANGE UP (ref 1.6–2.6)
MCHC RBC-ENTMCNC: 27.7 PG — SIGNIFICANT CHANGE UP (ref 27–34)
MCHC RBC-ENTMCNC: 31.3 % — LOW (ref 32–36)
MCV RBC AUTO: 88.4 FL — SIGNIFICANT CHANGE UP (ref 80–100)
NRBC # FLD: 0 — SIGNIFICANT CHANGE UP
PLATELET # BLD AUTO: 216 K/UL — SIGNIFICANT CHANGE UP (ref 150–400)
PMV BLD: 9.8 FL — SIGNIFICANT CHANGE UP (ref 7–13)
POTASSIUM SERPL-MCNC: 4.7 MMOL/L — SIGNIFICANT CHANGE UP (ref 3.5–5.3)
POTASSIUM SERPL-SCNC: 4.7 MMOL/L — SIGNIFICANT CHANGE UP (ref 3.5–5.3)
RBC # BLD: 4.55 M/UL — SIGNIFICANT CHANGE UP (ref 4.2–5.8)
RBC # FLD: 14.3 % — SIGNIFICANT CHANGE UP (ref 10.3–14.5)
SODIUM SERPL-SCNC: 135 MMOL/L — SIGNIFICANT CHANGE UP (ref 135–145)
WBC # BLD: 7.13 K/UL — SIGNIFICANT CHANGE UP (ref 3.8–10.5)
WBC # FLD AUTO: 7.13 K/UL — SIGNIFICANT CHANGE UP (ref 3.8–10.5)

## 2018-05-13 PROCEDURE — 78582 LUNG VENTILAT&PERFUS IMAGING: CPT | Mod: 26,GC

## 2018-05-13 RX ORDER — HEPARIN SODIUM 5000 [USP'U]/ML
5000 INJECTION INTRAVENOUS; SUBCUTANEOUS EVERY 12 HOURS
Qty: 0 | Refills: 0 | Status: DISCONTINUED | OUTPATIENT
Start: 2018-05-13 | End: 2018-05-16

## 2018-05-13 RX ADMIN — CLOPIDOGREL BISULFATE 75 MILLIGRAM(S): 75 TABLET, FILM COATED ORAL at 11:15

## 2018-05-13 RX ADMIN — ISOSORBIDE MONONITRATE 30 MILLIGRAM(S): 60 TABLET, EXTENDED RELEASE ORAL at 11:15

## 2018-05-13 RX ADMIN — AMLODIPINE BESYLATE 5 MILLIGRAM(S): 2.5 TABLET ORAL at 06:06

## 2018-05-13 RX ADMIN — SODIUM POLYSTYRENE SULFONATE 30 GRAM(S): 4.1 POWDER, FOR SUSPENSION ORAL at 00:05

## 2018-05-13 RX ADMIN — ATORVASTATIN CALCIUM 80 MILLIGRAM(S): 80 TABLET, FILM COATED ORAL at 21:20

## 2018-05-13 RX ADMIN — HEPARIN SODIUM 5000 UNIT(S): 5000 INJECTION INTRAVENOUS; SUBCUTANEOUS at 17:25

## 2018-05-13 RX ADMIN — FEBUXOSTAT 80 MILLIGRAM(S): 40 TABLET ORAL at 11:15

## 2018-05-13 RX ADMIN — HEPARIN SODIUM 700 UNIT(S)/HR: 5000 INJECTION INTRAVENOUS; SUBCUTANEOUS at 06:06

## 2018-05-13 RX ADMIN — Medication 81 MILLIGRAM(S): at 11:15

## 2018-05-13 RX ADMIN — Medication 25 MILLIGRAM(S): at 06:06

## 2018-05-13 RX ADMIN — Medication 3 MILLIGRAM(S): at 21:20

## 2018-05-13 NOTE — PROGRESS NOTE ADULT - SUBJECTIVE AND OBJECTIVE BOX
No pain, no shortness of breath      VITAL:  T(C): , Max: 36.7 (05-12-18 @ 11:00)  T(F): , Max: 98 (05-12-18 @ 11:00)  HR: 65 (05-13-18 @ 06:04)  BP: 122/83 (05-13-18 @ 06:04)  BP(mean): --  RR: 16 (05-13-18 @ 06:04)  SpO2: 100% (05-13-18 @ 06:04)      PHYSICAL EXAM:  Constitutional: NAD, Alert; frail  HEENT: NCAT, DMM  Neck: Supple, No JVD  Respiratory: CTA-b/l  Cardiovascular: RRR s1s2, no m/r/g  Gastrointestinal: BS+, soft, NT/ND  Extremities: No peripheral edema b/l  Neurological: no focal deficits; strength grossly intact  Psychiatric: Normal mood, normal affect  Back: no CVAT b/l  Skin: No rashes, no nevi , (+)scattered large tophi      LABS:                        12.6   7.13  )-----------( 216      ( 13 May 2018 04:00 )             40.2     Na(135)/K(4.7)/Cl(104)/HCO3(18)/BUN(22)/Cr(1.79)Glu(90)/Ca(8.9)/Mg(1.9)/PO4(--)    05-13 @ 04:00  Na(134)/K(5.7)/Cl(101)/HCO3(21)/BUN(27)/Cr(1.87)Glu(90)/Ca(8.9)/Mg(1.9)/PO4(3.1)    05-12 @ 07:28  Na(134)/K(5.5)/Cl(100)/HCO3(20)/BUN(27)/Cr(1.95)Glu(115)/Ca(9.0)/Mg(--)/PO4(--)    05-11 @ 23:00        IMPRESSION: 62M w/ HTN, HLD, gout, CKD3, and CAD-CABG, s/p recent admission for PCI, and 5/12/18 returned with chest pain    (1)Renal - CKD3 - nonproteinuric. Numbers fluctuating based on hemodynamic status (prerenal). GFR 30-40ml/min.    (2)Hyperkalemia -now improved, off ACEI, and s/p Kayexalate. On a low-K diet for now.    (3)Metabolic acidosis - a bit worse today - likely from GI losses induced by Kayexalate. No need for treatment of the acidosis for now    (4)Chest pain - being ruled out for PE; on heparin gtt; awaiting VQ        RECOMMEND:  (1)Continue low-K diet for now  (2)No ACEI/ARB for now  (3)F/U VQ  (4)Dose new meds for GFR 30-40ml/min          Jono Andino MD  [facility]  [phone]

## 2018-05-13 NOTE — CONSULT NOTE ADULT - ASSESSMENT
63 y/o M with h/o CAD s/p CABG and stent, gout, HLD, HTN presents to the ED for chest pain. Admit to telemetry.       Problem/Plan - 1:  ·  Problem: Angina pectoris.  Plan: telemonitor  cards fu  cw current meenu  Ischemia munoz as per cards    Problem/Plan - 2:  ·  Problem: HLD (hyperlipidemia).  Plan: cw sytatin    Problem/Plan - 3:  ·  Problem: HTN (hypertension).  Plan: cw home meds    Problem/Plan - 4:  ·  Problem: CAD (coronary artery disease).  Plan: Continue with aspirin and plavix.   cards following

## 2018-05-13 NOTE — CONSULT NOTE ADULT - SUBJECTIVE AND OBJECTIVE BOX
cc chest pain    [place] 63y/o M w/ PMHx CAD s/p CABG and stents (most recently 2 days ago to mLAD), HTN, HLD, gout presents to the ED for chest pain. Pt states he has left sided chest pain with radiation to the left arm, leg and neck and back. Pt states the chest pain is pleuritic, nonpositional, and reproducible. Pt states he took his ASA and plavix today. Pt denies LOC, syncope, fever, chills, N/V/D/C, shortness of breath, palpitations, numbness, tingling, dysuria, urinary/bowel incontinence, weakness or any other complaints at this time.     Allergies NKDA    REVIEW OF SYSTEMS:    CONSTITUTIONAL: No weakness, fevers or chills  EYES/ENT: No visual changes;  No vertigo or throat pain   NECK: No pain or stiffness  RESPIRATORY: No cough, wheezing, hemoptysis; No shortness of breath  CARDIOVASCULAR: No chest pain or palpitations  GASTROINTESTINAL: No abdominal or epigastric pain. No nausea, vomiting, or hematemesis; No diarrhea or constipation. No melena or hematochezia.  GENITOURINARY: No dysuria, frequency or hematuria  NEUROLOGICAL: No numbness or weakness  SKIN: No itching, burning, rashes, or lesions   All other review of systems is negative unless indicated above.    Home Medications:   * Patient Currently Takes Medications as of 12-May-2018 01:25 documented in Structured Notes  · 	atorvastatin 80 mg oral tablet: 1 tab(s) orally once a day (at bedtime), Last Dose Taken:    · 	Metoprolol Succinate ER 25 mg oral tablet, extended release: 1 tab(s) orally once a day, Last Dose Taken:    · 	Ecotrin Adult Low Strength 81 mg oral delayed release tablet: 1 tab(s) orally once a day, Last Dose Taken:    · 	amLODIPine 5 mg oral tablet: 1 tab(s) orally once a day, Last Dose Taken:    · 	Plavix 75 mg oral tablet: 1 tab(s) orally once a day, Last Dose Taken:    · 	lisinopril 5 mg oral tablet: 1 tab(s) orally once a day, Last Dose Taken:    · 	Uloric 80 mg oral tablet: 1 tab(s) orally once a day, Last Dose Taken:    · 	isosorbide mononitrate 30 mg oral tablet, extended release: 3 tab(s) orally once a day (in the morning), Last Dose Taken:      Patient History:   Past Medical History:  CAD (coronary artery disease)    Chronic gout of elbow, unspecified cause, unspecified laterality    HLD (hyperlipidemia)    HTN (hypertension).    Past Surgical History:H/O heart artery stent  2016  S/P CABG x 3  2015.    Family History:  No pertinent family history in first degree relatives.    Social History:  Social History (marital status, living situation, occupation, tobacco use, alcohol and drug use, and sexual history): Pt , lives with spouse. Denies smoking, drinking or drugs.	      Physical exam    General: WN/WD NAD  PERRLA  Neurology: A&Ox3, nonfocal, BAKER x 4  Respiratory: CTA B/L  CV: RRR, S1S2, no murmurs, rubs or gallops  Abdominal: Soft, NT, ND +BS, Last BM  Extremities: No edema, + peripheral pulses  Skin Normal     labs    Lab Results:  CBC  CBC Full  -  ( 13 May 2018 04:00 )  WBC Count : 7.13 K/uL  Hemoglobin : 12.6 g/dL  Hematocrit : 40.2 %  Platelet Count - Automated : 216 K/uL  Mean Cell Volume : 88.4 fL  Mean Cell Hemoglobin : 27.7 pg  Mean Cell Hemoglobin Concentration : 31.3 %  Auto Neutrophil # : x  Auto Lymphocyte # : x  Auto Monocyte # : x  Auto Eosinophil # : x  Auto Basophil # : x  Auto Neutrophil % : x  Auto Lymphocyte % : x  Auto Monocyte % : x  Auto Eosinophil % : x  Auto Basophil % : x    .		Differential:	[] Automated		[] Manual  Chemistry                        12.6   7.13  )-----------( 216      ( 13 May 2018 04:00 )             40.2     05-13    135  |  104  |  22  ----------------------------<  90  4.7   |  18<L>  |  1.79<H>    Ca    8.9      13 May 2018 04:00  Phos  3.1     05-12  Mg     1.9     05-13    TPro  7.2  /  Alb  4.1  /  TBili  0.5  /  DBili  x   /  AST  19  /  ALT  14  /  AlkPhos  82  05-11    LIVER FUNCTIONS - ( 11 May 2018 23:00 )  Alb: 4.1 g/dL / Pro: 7.2 g/dL / ALK PHOS: 82 u/L / ALT: 14 u/L / AST: 19 u/L / GGT: x           PT/INR - ( 11 May 2018 23:00 )   PT: 11.6 SEC;   INR: 1.01          PTT - ( 13 May 2018 04:00 )  PTT:72.1 SEC          MICROBIOLOGY/CULTURES:      RADIOLOGY RESULTS: reviewed

## 2018-05-13 NOTE — PROGRESS NOTE ADULT - SUBJECTIVE AND OBJECTIVE BOX
Subjective:   	 no c/o chest pain / shortness of breath  on exam      MEDICATIONS:  MEDICATIONS  (STANDING):  amLODIPine   Tablet 5 milliGRAM(s) Oral daily  aspirin enteric coated 81 milliGRAM(s) Oral daily  atorvastatin 80 milliGRAM(s) Oral at bedtime  clopidogrel Tablet 75 milliGRAM(s) Oral daily  febuxostat 80 milliGRAM(s) Oral daily  isosorbide   mononitrate ER Tablet (IMDUR) 30 milliGRAM(s) Oral daily  metoprolol succinate ER 25 milliGRAM(s) Oral daily    MEDICATIONS  (PRN):  melatonin 3 milliGRAM(s) Oral at bedtime PRN Sleep      LABS:	 	    CARDIAC MARKERS:  CARDIAC MARKERS ( 12 May 2018 07:28 )  x     / < 0.06 ng/mL / 84 u/L / 3.68 ng/mL / x      CARDIAC MARKERS ( 11 May 2018 23:00 )  x     / < 0.06 ng/mL / x     / x     / x                                    12.6   7.13  )-----------( 216      ( 13 May 2018 04:00 )             40.2     05-13    135  |  104  |  22  ----------------------------<  90  4.7   |  18<L>  |  1.79<H>    Ca    8.9      13 May 2018 04:00  Phos  3.1     05-12  Mg     1.9     05-13    TPro  7.2  /  Alb  4.1  /  TBili  0.5  /  DBili  x   /  AST  19  /  ALT  14  /  AlkPhos  82  05-11    COAGS:   PTT - ( 13 May 2018 04:00 )  PTT:72.1 SEC    proBNP:   Lipid Profile:   HgA1c:   TSH:       PHYSICAL EXAM:  T(C): 36.6 (05-13-18 @ 11:14), Max: 36.7 (05-12-18 @ 21:36)  HR: 67 (05-13-18 @ 11:14) (65 - 67)  BP: 151/83 (05-13-18 @ 11:14) (101/63 - 151/83)  RR: 16 (05-13-18 @ 11:14) (16 - 16)  SpO2: 100% (05-13-18 @ 11:14) (100% - 100%)  Wt(kg): --  I&O's Summary    12 May 2018 07:01  -  13 May 2018 07:00  --------------------------------------------------------  IN: 480 mL / OUT: 0 mL / NET: 480 mL    13 May 2018 07:01  -  13 May 2018 14:08  --------------------------------------------------------  IN: 480 mL / OUT: 400 mL / NET: 80 mL          	    Cardiovascular: Normal S1 S2,     No JVD, 1/6 PATRICIA murmur,   Respiratory: Lungs clear to auscultation, normal effort 	  Gastrointestinal:  Soft, Non-tender, + BS	  Extremities no edema, cyanosis, clubbing B/L LE's   Peripheral pulses palpable 2+ bilaterally    TELEMETRY: 	    ECG:  	  RADIOLOGY:     DIAGNOSTIC TESTING:  [ ] Echocardiogram:   [ ]  Catheterization:  < from: Cardiac Cath Lab - Adult (05.09.18 @ 15:23) >  VENTRICLES: No left ventriculogram was performed.  CORONARY VESSELS:  LM:   --  Distal left main: There was a discrete 30 % stenosis at a site  with no prior intervention. The lesion was concentric. There was HARINI  grade 3 flow through the vessel (brisk flow).  LAD:   --  Proximal LAD: There was a tubular 70 % stenosis at a site with  no prior intervention. The lesion was eccentric. There was HARINI grade 3  flow through the vessel (brisk flow) and a large vascular territory distal  to the lesion. This is a likely culprit for the patient's clinical  presentation. An intervention was performed.iFR 0.88  --  Mid LAD: Mild Myocardial bridging was present. There was a discrete 50  % stenosis at a site with no prior intervention. The lesion was eccentric.  There was HARINI grade 3 flow through the vessel (brisk flow).  COMPLICATIONS: There were no complications.  DIAGNOSTIC IMPRESSIONS: Successful PCI to severe stenosis of proximal LAD  using 3.5mm Giancarlo LEE ANN  Post PCI iFR was 0.92 (this is related to moderate left main and moderate  mid LAD stenosis)  DIAGNOSTIC RECOMMENDATIONS: DAPT x 12 months  Aggressive risk factor modification  INTERVENTIONAL IMPRESSIONS: Successful PCI to severe stenosis of proximal  LAD using 3.5mm Nicholson LEE ANN  Post PCI iFR was 0.92 (this is related to moderate left main and moderate  mid LAD stenosis)  INTERVENTIONAL RECOMMENDATIONS: DAPT x 12 months  Aggressive risk factor modification  Prepared and signed by  Willow Guthrie M.D.    < end of copied text >    [ ] Stress Test:      OTHER: 	  < from: [facility] Pulmonary Ventilation/Perfusion Scan (05.13.18 @ 10:16) >  IMPRESSION: Abnormal ventilation/perfusion lung scan. Very low   probability of pulmonary embolus.      < end of copied text >      ASSESSMENT/PLAN: 	62y Male w/ PMHx CAD s/p CABG and stents (most recently 2 days ago to mLAD), HTN, HLD, gout presents to the ED for chest pain. Pt states he has left sided chest pain with radiation to the left arm, leg and neck and back. Pt states the chest pain is pleuritic, nonpositional, and reproducible.     - VQ scan very low probability of PE   -continue aspirin and Plavix  -f/u renal   -f/u GI

## 2018-05-14 DIAGNOSIS — R10.13 EPIGASTRIC PAIN: ICD-10-CM

## 2018-05-14 LAB
APTT BLD: 29.8 SEC — SIGNIFICANT CHANGE UP (ref 27.5–37.4)
BUN SERPL-MCNC: 20 MG/DL — SIGNIFICANT CHANGE UP (ref 7–23)
BUN SERPL-MCNC: 20 MG/DL — SIGNIFICANT CHANGE UP (ref 7–23)
CALCIUM SERPL-MCNC: 9.1 MG/DL — SIGNIFICANT CHANGE UP (ref 8.4–10.5)
CALCIUM SERPL-MCNC: 9.1 MG/DL — SIGNIFICANT CHANGE UP (ref 8.4–10.5)
CHLORIDE SERPL-SCNC: 103 MMOL/L — SIGNIFICANT CHANGE UP (ref 98–107)
CHLORIDE SERPL-SCNC: 103 MMOL/L — SIGNIFICANT CHANGE UP (ref 98–107)
CO2 SERPL-SCNC: 20 MMOL/L — LOW (ref 22–31)
CO2 SERPL-SCNC: 20 MMOL/L — LOW (ref 22–31)
CREAT SERPL-MCNC: 1.55 MG/DL — HIGH (ref 0.5–1.3)
CREAT SERPL-MCNC: 1.55 MG/DL — HIGH (ref 0.5–1.3)
GLUCOSE SERPL-MCNC: 76 MG/DL — SIGNIFICANT CHANGE UP (ref 70–99)
GLUCOSE SERPL-MCNC: 76 MG/DL — SIGNIFICANT CHANGE UP (ref 70–99)
HCT VFR BLD CALC: 39.1 % — SIGNIFICANT CHANGE UP (ref 39–50)
HGB BLD-MCNC: 12.8 G/DL — LOW (ref 13–17)
MAGNESIUM SERPL-MCNC: 1.7 MG/DL — SIGNIFICANT CHANGE UP (ref 1.6–2.6)
MAGNESIUM SERPL-MCNC: 1.7 MG/DL — SIGNIFICANT CHANGE UP (ref 1.6–2.6)
MCHC RBC-ENTMCNC: 28.2 PG — SIGNIFICANT CHANGE UP (ref 27–34)
MCHC RBC-ENTMCNC: 32.7 % — SIGNIFICANT CHANGE UP (ref 32–36)
MCV RBC AUTO: 86.1 FL — SIGNIFICANT CHANGE UP (ref 80–100)
NRBC # FLD: 0 — SIGNIFICANT CHANGE UP
PHOSPHATE SERPL-MCNC: 2.7 MG/DL — SIGNIFICANT CHANGE UP (ref 2.5–4.5)
PLATELET # BLD AUTO: 224 K/UL — SIGNIFICANT CHANGE UP (ref 150–400)
PMV BLD: 10 FL — SIGNIFICANT CHANGE UP (ref 7–13)
POTASSIUM SERPL-MCNC: 4.4 MMOL/L — SIGNIFICANT CHANGE UP (ref 3.5–5.3)
POTASSIUM SERPL-MCNC: 4.4 MMOL/L — SIGNIFICANT CHANGE UP (ref 3.5–5.3)
POTASSIUM SERPL-SCNC: 4.4 MMOL/L — SIGNIFICANT CHANGE UP (ref 3.5–5.3)
POTASSIUM SERPL-SCNC: 4.4 MMOL/L — SIGNIFICANT CHANGE UP (ref 3.5–5.3)
RBC # BLD: 4.54 M/UL — SIGNIFICANT CHANGE UP (ref 4.2–5.8)
RBC # FLD: 14.3 % — SIGNIFICANT CHANGE UP (ref 10.3–14.5)
SODIUM SERPL-SCNC: 136 MMOL/L — SIGNIFICANT CHANGE UP (ref 135–145)
SODIUM SERPL-SCNC: 136 MMOL/L — SIGNIFICANT CHANGE UP (ref 135–145)
WBC # BLD: 8.04 K/UL — SIGNIFICANT CHANGE UP (ref 3.8–10.5)
WBC # FLD AUTO: 8.04 K/UL — SIGNIFICANT CHANGE UP (ref 3.8–10.5)

## 2018-05-14 RX ORDER — PANTOPRAZOLE SODIUM 20 MG/1
40 TABLET, DELAYED RELEASE ORAL
Qty: 0 | Refills: 0 | Status: DISCONTINUED | OUTPATIENT
Start: 2018-05-14 | End: 2018-05-16

## 2018-05-14 RX ORDER — DOCUSATE SODIUM 100 MG
100 CAPSULE ORAL
Qty: 0 | Refills: 0 | Status: DISCONTINUED | OUTPATIENT
Start: 2018-05-14 | End: 2018-05-16

## 2018-05-14 RX ORDER — SENNA PLUS 8.6 MG/1
2 TABLET ORAL AT BEDTIME
Qty: 0 | Refills: 0 | Status: DISCONTINUED | OUTPATIENT
Start: 2018-05-14 | End: 2018-05-16

## 2018-05-14 RX ORDER — SUCRALFATE 1 G
1 TABLET ORAL
Qty: 0 | Refills: 0 | Status: DISCONTINUED | OUTPATIENT
Start: 2018-05-14 | End: 2018-05-16

## 2018-05-14 RX ADMIN — Medication 1 GRAM(S): at 12:50

## 2018-05-14 RX ADMIN — ISOSORBIDE MONONITRATE 30 MILLIGRAM(S): 60 TABLET, EXTENDED RELEASE ORAL at 10:00

## 2018-05-14 RX ADMIN — HEPARIN SODIUM 5000 UNIT(S): 5000 INJECTION INTRAVENOUS; SUBCUTANEOUS at 17:09

## 2018-05-14 RX ADMIN — Medication 30 MILLILITER(S): at 17:12

## 2018-05-14 RX ADMIN — FEBUXOSTAT 80 MILLIGRAM(S): 40 TABLET ORAL at 10:01

## 2018-05-14 RX ADMIN — CLOPIDOGREL BISULFATE 75 MILLIGRAM(S): 75 TABLET, FILM COATED ORAL at 09:59

## 2018-05-14 RX ADMIN — Medication 1 GRAM(S): at 17:09

## 2018-05-14 RX ADMIN — Medication 81 MILLIGRAM(S): at 10:00

## 2018-05-14 RX ADMIN — PANTOPRAZOLE SODIUM 40 MILLIGRAM(S): 20 TABLET, DELAYED RELEASE ORAL at 17:12

## 2018-05-14 RX ADMIN — Medication 25 MILLIGRAM(S): at 05:28

## 2018-05-14 RX ADMIN — AMLODIPINE BESYLATE 5 MILLIGRAM(S): 2.5 TABLET ORAL at 05:28

## 2018-05-14 RX ADMIN — ATORVASTATIN CALCIUM 80 MILLIGRAM(S): 80 TABLET, FILM COATED ORAL at 22:42

## 2018-05-14 RX ADMIN — Medication 1 GRAM(S): at 22:41

## 2018-05-14 RX ADMIN — HEPARIN SODIUM 5000 UNIT(S): 5000 INJECTION INTRAVENOUS; SUBCUTANEOUS at 05:28

## 2018-05-14 RX ADMIN — SENNA PLUS 2 TABLET(S): 8.6 TABLET ORAL at 22:44

## 2018-05-14 RX ADMIN — Medication 3 MILLIGRAM(S): at 22:42

## 2018-05-14 RX ADMIN — Medication 100 MILLIGRAM(S): at 17:12

## 2018-05-14 NOTE — CONSULT NOTE ADULT - ASSESSMENT
63yo M w/ PMHx CAD s/p CABG and stents (most recent on 5/9/2018 to pLAARTEM), HTN, HLD, gout presents to the ED for left sided chest discomfort/epigastric discomfort

## 2018-05-14 NOTE — CONSULT NOTE ADULT - PROBLEM SELECTOR RECOMMENDATION 9
- trend cbc  - transfuse prn   - having dark stools  - protonix 40mg IVP BID started; change to PO on discharge  - carafate 1g PO QID started  - maalox prn   - will treat medically and defer endoscopic evaluation at this time given recent stent placement to pLAD on 5/9

## 2018-05-14 NOTE — PROGRESS NOTE ADULT - SUBJECTIVE AND OBJECTIVE BOX
SUBJECTIVE: No further CP, no SOB    MEDICATIONS  (STANDING):  amLODIPine   Tablet 5 milliGRAM(s) Oral daily  aspirin enteric coated 81 milliGRAM(s) Oral daily  atorvastatin 80 milliGRAM(s) Oral at bedtime  clopidogrel Tablet 75 milliGRAM(s) Oral daily  docusate sodium 100 milliGRAM(s) Oral two times a day  febuxostat 80 milliGRAM(s) Oral daily  heparin  Injectable 5000 Unit(s) SubCutaneous every 12 hours  isosorbide   mononitrate ER Tablet (IMDUR) 30 milliGRAM(s) Oral daily  metoprolol succinate ER 25 milliGRAM(s) Oral daily  pantoprazole  Injectable 40 milliGRAM(s) IV Push two times a day  senna 2 Tablet(s) Oral at bedtime  sucralfate 1 Gram(s) Oral four times a day    MEDICATIONS  (PRN):  aluminum hydroxide/magnesium hydroxide/simethicone Suspension 30 milliLiter(s) Oral every 6 hours PRN Dyspepsia  melatonin 3 milliGRAM(s) Oral at bedtime PRN Sleep      LABS:                        12.8   8.04  )-----------( 224      ( 14 May 2018 06:42 )             39.1   136  |  103  |  20  ----------------------------<  76  4.4   |  20<L>  |  1.55<H>    Ca    9.1      14 May 2018 06:42  Phos  2.7     05-14  Mg     1.7     05-14    PTT - ( 14 May 2018 06:42 )  PTT:29.8 SEC  CARDIAC MARKERS ( 12 May 2018 07:28 )  x     / < 0.06 ng/mL / 84 u/L / 3.68 ng/mL / x      CARDIAC MARKERS ( 11 May 2018 23:00 )  x     / < 0.06 ng/mL / x     / x     / x        PHYSICAL EXAM:  Vital Signs Last 24 Hrs  T(C): 36.8 (14 May 2018 09:56), Max: 36.8 (14 May 2018 09:56)  T(F): 98.2 (14 May 2018 09:56), Max: 98.2 (14 May 2018 09:56)  HR: 68 (14 May 2018 12:41) (61 - 78)  BP: 117/74 (14 May 2018 12:41) (106/73 - 118/74)  BP(mean): 83 (14 May 2018 09:56) (83 - 83)  RR: 16 (14 May 2018 12:41) (16 - 16)  SpO2: 100% (14 May 2018 12:41) (100% - 100%)    Cardiovascular:  S1S2 RRR, No JVD  Respiratory: Lungs clear to auscultation, normal effort  Gastrointestinal: Abdomen soft, ND, NT, +BS  Skin: Warm, dry, intact. No rash.  Musculoskeletal: Normal ROM, normal strength  Ext: No C/C/E B/L LE    DIAGNOSTIC DATA  TELEMETRY: NSR    < from: Cardiac Cath Lab - Adult (05.09.18 @ 15:23) >  VENTRICLES: No left ventriculogram was performed.  CORONARY VESSELS:  LM:   --  Distal left main: There was a discrete 30 % stenosis at a site  with no prior intervention. The lesion was concentric. There was HARINI  grade 3 flow through the vessel (brisk flow).  LAD:   --  Proximal LAD: There was a tubular 70 % stenosis at a site with  no prior intervention. The lesion was eccentric. There was HARINI grade 3  flow through the vessel (brisk flow) and a large vascular territory distal  to the lesion. This is a likely culprit for the patient's clinical  presentation. An intervention was performed.iFR 0.88  --  Mid LAD: Mild Myocardial bridging was present. There was a discrete 50  % stenosis at a site with no prior intervention. The lesion was eccentric.  There was HARINI grade 3 flow through the vessel (brisk flow).  COMPLICATIONS: There were no complications.  DIAGNOSTIC IMPRESSIONS: Successful PCI to severe stenosis of proximal LAD  using 3.5mm Giancarlo LEE ANN  Post PCI iFR was 0.92 (this is related to moderate left main and moderate  mid LAD stenosis)  DIAGNOSTIC RECOMMENDATIONS: DAPT x 12 months  Aggressive risk factor modification  INTERVENTIONAL IMPRESSIONS: Successful PCI to severe stenosis of proximal  LAD using 3.5mm Giancarlo LEE ANN  Post PCI iFR was 0.92 (this is related to moderate left main and moderate  mid LAD stenosis)  INTERVENTIONAL RECOMMENDATIONS: DAPT x 12 months  Aggressive risk factor modification  Prepared and signed by  Willow Guthrie M.D.  Signed 05/10/2018 11:07:40    < end of copied text >    < from: [facility] Pulmonary Ventilation/Perfusion Scan (05.13.18 @ 10:16) >  IMPRESSION: Abnormal ventilation/perfusion lung scan. Very low   probability of pulmonary embolus.    < end of copied text >        ASSESSMENT AND PLAN:  62 year old Male w/ hx of CAD s/p CABG in 2015, s/p  PCI 2016, admitted to us last week with Chest pain, s/p recent normal NST as OP per patient, s/p cath and staged PCI to pLAD on 5/9, HTN, HLD, Gout, congential hydrocephalus, who is readmitted with atypical chest pain    --ACS ruled out with serial CE  --VQ scan very low prob for PE  --GI consulted --recommend IV PPI, Carafate and Maalox, monitor stools and monitor H/H  --repeat TTE pending, r/o pericardial effusion/pericarditis    Tiarra Dumont PA-C

## 2018-05-14 NOTE — CONSULT NOTE ADULT - SUBJECTIVE AND OBJECTIVE BOX
Chief Complaint:  Patient is a 62y old  Male who presents with a chief complaint of chest pain     Chronic gout of elbow, unspecified cause, unspecified laterality  HLD (hyperlipidemia)  CAD (coronary artery disease)  HTN (hypertension)  S/P CABG x 3  H/O heart artery stent     HPI:  61yo M w/ PMHx CAD s/p CABG and stents (most recent about 1-2 weeks ago to mLAD), HTN, HLD, gout presents to the ED for chest pain. Pt states he has left sided chest pain with radiation to the left arm, leg and neck and back. Pt states the chest pain is pleuritic, non positional and reproducible. Patient is on ASA and plavix . GI consulted for atypical chest pain/epigastric pain. The patient is pointing to his left chest wall as to the location of his pain. He also states that it occasionally is epigastric and PO intake at times worsens the discomfort. His appetite is "just fine" and he is without any nausea or vomiting. He reports that he has had some black color stools the last few times he went. He has had a colonoscopy about 1 year ago that was "normal" but has denied ever having an EGD. He reports that he does not take iron pills. HE has no n/v/d/c, at present no abdominal pain, c/o left chest wall pain, no hematochezia or hematemesis.          No Known Allergies      aluminum hydroxide/magnesium hydroxide/simethicone Suspension 30 milliLiter(s) Oral every 6 hours PRN  amLODIPine   Tablet 5 milliGRAM(s) Oral daily  aspirin enteric coated 81 milliGRAM(s) Oral daily  atorvastatin 80 milliGRAM(s) Oral at bedtime  clopidogrel Tablet 75 milliGRAM(s) Oral daily  docusate sodium 100 milliGRAM(s) Oral two times a day  febuxostat 80 milliGRAM(s) Oral daily  heparin  Injectable 5000 Unit(s) SubCutaneous every 12 hours  isosorbide   mononitrate ER Tablet (IMDUR) 30 milliGRAM(s) Oral daily  melatonin 3 milliGRAM(s) Oral at bedtime PRN  metoprolol succinate ER 25 milliGRAM(s) Oral daily  pantoprazole  Injectable 40 milliGRAM(s) IV Push two times a day  senna 2 Tablet(s) Oral at bedtime  sucralfate 1 Gram(s) Oral four times a day        FAMILY HISTORY:  No pertinent family history in first degree relatives        Review of Systems:    General:  No wt loss, fevers, chills, night sweats, fatigue  Eyes:  Good vision, no reported pain  ENT:  No sore throat, pain, runny nose, dysphagia  CV:  No pain, palpitations, no lightheadedness  Resp:  No dyspnea, cough, tachypnea, wheezing  GI: as above  :  No pain, bleeding, incontinence, nocturia  Muscle:  No pain, weakness  Neuro:  No weakness, tingling, memory problems  Psych:  No fatigue, insomnia, mood problems, depression  Endocrine:  No polyuria, polydypsia, cold/heat intolerance  Heme:  No petechiae, ecchymosis, easy bruisability  Skin:  No rash, tattoos, scars, edema    Relevant Family History:   n/c    Relevant Social History: n/c      Physical Exam:    Vital Signs:  Vital Signs Last 24 Hrs  T(C): 36.8 (14 May 2018 09:56), Max: 36.8 (14 May 2018 09:56)  T(F): 98.2 (14 May 2018 09:56), Max: 98.2 (14 May 2018 09:56)  HR: 71 (14 May 2018 09:56) (61 - 78)  BP: 114/70 (14 May 2018 09:56) (106/73 - 151/83)  BP(mean): 83 (14 May 2018 09:56) (83 - 83)  RR: 16 (14 May 2018 09:56) (16 - 16)  SpO2: 100% (14 May 2018 09:56) (100% - 100%)  Daily     Daily Weight in k.8 (14 May 2018 07:15)    General:  Appears stated age, well-groomed, nad  HEENT:  NC/AT,  conjunctivae clear and pink, no thyromegaly, nodules, adenopathy, no JVD  Chest:  Full & symmetric excursion, no increased effort, breath sounds clear  Cardiovascular:  Regular rhythm, S1, S2, no murmur/rub/S3/S4, no abdominal bruit, no edema  Abdomen:  Soft, non-tender, non-distended, normoactive bowel sounds,  no masses ,no hepatosplenomeagaly, no signs of chronic liver disease  Extremities:  no cyanosis,clubbing or edema  Skin:  No rash/erythema/ecchymoses/petechiae/wounds/abscess/warm/dry  Neuro/Psych:  A&Ox3, no asterixis, no tremor, no encephalopathy    Laboratory:                            12.8   8.04  )-----------( 224      ( 14 May 2018 06:42 )             39.1     -    136  |  103  |  20  ----------------------------<  76  4.4   |  20<L>  |  1.55<H>    Ca    9.1      14 May 2018 06:42  Phos  2.7       Mg     1.7             PTT - ( 14 May 2018 06:42 )  PTT:29.8 SEC      Imaging:

## 2018-05-14 NOTE — PROGRESS NOTE ADULT - SUBJECTIVE AND OBJECTIVE BOX
No pain, no shortness of breath      VITAL:  T(C): , Max: 36.7 (05-13-18 @ 21:19)  T(F): , Max: 98 (05-13-18 @ 21:19)  HR: 61 (05-14-18 @ 05:26)  BP: 118/74 (05-14-18 @ 05:26)  BP(mean): --  RR: 16 (05-14-18 @ 05:26)  SpO2: 100% (05-14-18 @ 05:26)      PHYSICAL EXAM:  Constitutional: NAD, Alert; frail  HEENT: NCAT, DMM  Neck: Supple, No JVD  Respiratory: CTA-b/l  Cardiovascular: RRR s1s2, no m/r/g  Gastrointestinal: BS+, soft, NT/ND  Extremities: No peripheral edema b/l  Neurological: no focal deficits; strength grossly intact  Psychiatric: Normal mood, normal affect  Back: no CVAT b/l  Skin: No rashes, no nevi , (+)scattered large tophi      LABS:                        12.8   8.04  )-----------( 224      ( 14 May 2018 06:42 )             39.1     Na(136)/K(4.4)/Cl(103)/HCO3(20)/BUN(20)/Cr(1.55)Glu(76)/Ca(9.1)/Mg(1.7)/PO4(2.7)    05-14 @ 06:42  Na(135)/K(4.7)/Cl(104)/HCO3(18)/BUN(22)/Cr(1.79)Glu(90)/Ca(8.9)/Mg(1.9)/PO4(--)    05-13 @ 04:00  Na(134)/K(5.7)/Cl(101)/HCO3(21)/BUN(27)/Cr(1.87)Glu(90)/Ca(8.9)/Mg(1.9)/PO4(3.1)    05-12 @ 07:28  Na(134)/K(5.5)/Cl(100)/HCO3(20)/BUN(27)/Cr(1.95)Glu(115)/Ca(9.0)/Mg(--)/PO4(--)    05-11 @ 23:00    IMAGING:   VQ Scan 5/13/18 - very low probability for PE      IMPRESSION: 62M w/ HTN, HLD, gout, CKD3, and CAD-CABG, s/p recent admission for PCI, and 5/12/18 returned with chest pain    (1)Renal - CKD3 - nonproteinuric. Numbers fluctuating based on hemodynamic status. GFR 30-40ml/min.    (2)Hyperkalemia -now improved, off ACEI, and s/p Kayexalate.     (3)Metabolic acidosis - improved as of today    (4)Chest pain - ruled out for PE by VQ scan      RECOMMEND:  (1)No ACEI/ARB for now  (2)Can discontinue dietary K+ restriction  (3)D/C planning per primary team; could f/u at my office in 2-4 weeks after discharge            Jono Andino MD  [facility]  [phone] No pain, no shortness of breath      VITAL:  T(C): , Max: 36.7 (05-13-18 @ 21:19)  T(F): , Max: 98 (05-13-18 @ 21:19)  HR: 61 (05-14-18 @ 05:26)  BP: 118/74 (05-14-18 @ 05:26)  BP(mean): --  RR: 16 (05-14-18 @ 05:26)  SpO2: 100% (05-14-18 @ 05:26)      PHYSICAL EXAM:  Constitutional: NAD, Alert; frail  HEENT: NCAT, DMM  Neck: Supple, No JVD  Respiratory: CTA-b/l  Cardiovascular: RRR s1s2, no m/r/g  Gastrointestinal: BS+, soft, NT/ND  Extremities: No peripheral edema b/l  Neurological: no focal deficits; strength grossly intact  Psychiatric: Normal mood, normal affect  Back: no CVAT b/l  Skin: No rashes, no nevi , (+)scattered large tophi      LABS:                        12.8   8.04  )-----------( 224      ( 14 May 2018 06:42 )             39.1     Na(136)/K(4.4)/Cl(103)/HCO3(20)/BUN(20)/Cr(1.55)Glu(76)/Ca(9.1)/Mg(1.7)/PO4(2.7)    05-14 @ 06:42  Na(135)/K(4.7)/Cl(104)/HCO3(18)/BUN(22)/Cr(1.79)Glu(90)/Ca(8.9)/Mg(1.9)/PO4(--)    05-13 @ 04:00  Na(134)/K(5.7)/Cl(101)/HCO3(21)/BUN(27)/Cr(1.87)Glu(90)/Ca(8.9)/Mg(1.9)/PO4(3.1)    05-12 @ 07:28  Na(134)/K(5.5)/Cl(100)/HCO3(20)/BUN(27)/Cr(1.95)Glu(115)/Ca(9.0)/Mg(--)/PO4(--)    05-11 @ 23:00    IMAGING:   VQ Scan 5/13/18 - very low probability for PE      IMPRESSION: 62M w/ HTN, HLD, gout, CKD3, and CAD-CABG, s/p recent admission for PCI, and 5/12/18 returned with chest pain    (1)Renal - CKD3 - nonproteinuric. Numbers fluctuating based on hemodynamic status. GFR 30-40ml/min.    (2)Hyperkalemia -now improved, off ACEI, and s/p Kayexalate.     (3)Metabolic acidosis - improved as of today    (4)Chest pain - ruled out for PE by VQ scan      RECOMMEND:  (1)No ACEI/ARB for now  (2)Can discontinue dietary K+ restriction for now; patient counseled on high-K+ foods to limit at home  (3)D/C planning per primary team; could f/u at my office in 2-4 weeks after discharge            Jono Andino MD  [facility]  [phone]

## 2018-05-14 NOTE — PROGRESS NOTE ADULT - SUBJECTIVE AND OBJECTIVE BOX
Patient is a 62y old  Male who presents with a chief complaint of chest pain (12 May 2018 02:53)      INTERVAL HPI/OVERNIGHT EVENTS:  T(C): 36.8 (05-14-18 @ 09:56), Max: 36.8 (05-14-18 @ 09:56)  HR: 68 (05-14-18 @ 12:41) (61 - 78)  BP: 117/74 (05-14-18 @ 12:41) (106/73 - 118/74)  RR: 16 (05-14-18 @ 12:41) (16 - 16)  SpO2: 100% (05-14-18 @ 12:41) (100% - 100%)  Wt(kg): --  I&O's Summary    13 May 2018 07:01  -  14 May 2018 07:00  --------------------------------------------------------  IN: 880 mL / OUT: 400 mL / NET: 480 mL    14 May 2018 07:01  -  14 May 2018 17:38  --------------------------------------------------------  IN: 360 mL / OUT: 0 mL / NET: 360 mL        LABS:                        12.8   8.04  )-----------( 224      ( 14 May 2018 06:42 )             39.1     05-14    136  |  103  |  20  ----------------------------<  76  4.4   |  20<L>  |  1.55<H>    Ca    9.1      14 May 2018 06:42  Phos  2.7     05-14  Mg     1.7     05-14      PTT - ( 14 May 2018 06:42 )  PTT:29.8 SEC    CAPILLARY BLOOD GLUCOSE                MEDICATIONS  (STANDING):  amLODIPine   Tablet 5 milliGRAM(s) Oral daily  aspirin enteric coated 81 milliGRAM(s) Oral daily  atorvastatin 80 milliGRAM(s) Oral at bedtime  clopidogrel Tablet 75 milliGRAM(s) Oral daily  docusate sodium 100 milliGRAM(s) Oral two times a day  febuxostat 80 milliGRAM(s) Oral daily  heparin  Injectable 5000 Unit(s) SubCutaneous every 12 hours  isosorbide   mononitrate ER Tablet (IMDUR) 30 milliGRAM(s) Oral daily  metoprolol succinate ER 25 milliGRAM(s) Oral daily  pantoprazole  Injectable 40 milliGRAM(s) IV Push two times a day  senna 2 Tablet(s) Oral at bedtime  sucralfate 1 Gram(s) Oral four times a day    MEDICATIONS  (PRN):  aluminum hydroxide/magnesium hydroxide/simethicone Suspension 30 milliLiter(s) Oral every 6 hours PRN Dyspepsia  melatonin 3 milliGRAM(s) Oral at bedtime PRN Sleep          PHYSICAL EXAM:  GENERAL: NAD, well-groomed, well-developed  HEAD:  Atraumatic, Normocephalic  CHEST/LUNG: Clear to percussion bilaterally; No rales, rhonchi, wheezing, or rubs  HEART: Regular rate and rhythm; No murmurs, rubs, or gallops  ABDOMEN: Soft, Nontender, Nondistended; Bowel sounds present  EXTREMITIES:  2+ Peripheral Pulses, No clubbing, cyanosis, or edema  LYMPH: No lymphadenopathy noted  SKIN: No rashes or lesions    Care Discussed with Consultants/Other Providers [ +] YES  [ ] NO

## 2018-05-15 DIAGNOSIS — R69 ILLNESS, UNSPECIFIED: ICD-10-CM

## 2018-05-15 LAB
BASOPHILS # BLD AUTO: 0.05 K/UL — SIGNIFICANT CHANGE UP (ref 0–0.2)
BASOPHILS NFR BLD AUTO: 0.7 % — SIGNIFICANT CHANGE UP (ref 0–2)
BUN SERPL-MCNC: 16 MG/DL — SIGNIFICANT CHANGE UP (ref 7–23)
CALCIUM SERPL-MCNC: 8.9 MG/DL — SIGNIFICANT CHANGE UP (ref 8.4–10.5)
CHLORIDE SERPL-SCNC: 106 MMOL/L — SIGNIFICANT CHANGE UP (ref 98–107)
CO2 SERPL-SCNC: 20 MMOL/L — LOW (ref 22–31)
CREAT SERPL-MCNC: 1.45 MG/DL — HIGH (ref 0.5–1.3)
EOSINOPHIL # BLD AUTO: 0.57 K/UL — HIGH (ref 0–0.5)
EOSINOPHIL NFR BLD AUTO: 7.8 % — HIGH (ref 0–6)
GLUCOSE SERPL-MCNC: 100 MG/DL — HIGH (ref 70–99)
HCT VFR BLD CALC: 40.1 % — SIGNIFICANT CHANGE UP (ref 39–50)
HCT VFR BLD CALC: 40.1 % — SIGNIFICANT CHANGE UP (ref 39–50)
HGB BLD-MCNC: 12.7 G/DL — LOW (ref 13–17)
HGB BLD-MCNC: 12.7 G/DL — LOW (ref 13–17)
IMM GRANULOCYTES # BLD AUTO: 0.04 # — SIGNIFICANT CHANGE UP
IMM GRANULOCYTES NFR BLD AUTO: 0.5 % — SIGNIFICANT CHANGE UP (ref 0–1.5)
LYMPHOCYTES # BLD AUTO: 1.63 K/UL — SIGNIFICANT CHANGE UP (ref 1–3.3)
LYMPHOCYTES # BLD AUTO: 22.3 % — SIGNIFICANT CHANGE UP (ref 13–44)
MAGNESIUM SERPL-MCNC: 1.6 MG/DL — SIGNIFICANT CHANGE UP (ref 1.6–2.6)
MCHC RBC-ENTMCNC: 27.8 PG — SIGNIFICANT CHANGE UP (ref 27–34)
MCHC RBC-ENTMCNC: 27.8 PG — SIGNIFICANT CHANGE UP (ref 27–34)
MCHC RBC-ENTMCNC: 31.7 % — LOW (ref 32–36)
MCHC RBC-ENTMCNC: 31.7 % — LOW (ref 32–36)
MCV RBC AUTO: 87.7 FL — SIGNIFICANT CHANGE UP (ref 80–100)
MCV RBC AUTO: 87.7 FL — SIGNIFICANT CHANGE UP (ref 80–100)
MONOCYTES # BLD AUTO: 0.79 K/UL — SIGNIFICANT CHANGE UP (ref 0–0.9)
MONOCYTES NFR BLD AUTO: 10.8 % — SIGNIFICANT CHANGE UP (ref 2–14)
NEUTROPHILS # BLD AUTO: 4.24 K/UL — SIGNIFICANT CHANGE UP (ref 1.8–7.4)
NEUTROPHILS NFR BLD AUTO: 57.9 % — SIGNIFICANT CHANGE UP (ref 43–77)
NRBC # FLD: 0 — SIGNIFICANT CHANGE UP
NRBC # FLD: 0 — SIGNIFICANT CHANGE UP
PLATELET # BLD AUTO: 215 K/UL — SIGNIFICANT CHANGE UP (ref 150–400)
PLATELET # BLD AUTO: 215 K/UL — SIGNIFICANT CHANGE UP (ref 150–400)
PMV BLD: 9.7 FL — SIGNIFICANT CHANGE UP (ref 7–13)
PMV BLD: 9.7 FL — SIGNIFICANT CHANGE UP (ref 7–13)
POTASSIUM SERPL-MCNC: 4 MMOL/L — SIGNIFICANT CHANGE UP (ref 3.5–5.3)
POTASSIUM SERPL-SCNC: 4 MMOL/L — SIGNIFICANT CHANGE UP (ref 3.5–5.3)
RBC # BLD: 4.57 M/UL — SIGNIFICANT CHANGE UP (ref 4.2–5.8)
RBC # BLD: 4.57 M/UL — SIGNIFICANT CHANGE UP (ref 4.2–5.8)
RBC # FLD: 14.3 % — SIGNIFICANT CHANGE UP (ref 10.3–14.5)
RBC # FLD: 14.3 % — SIGNIFICANT CHANGE UP (ref 10.3–14.5)
SODIUM SERPL-SCNC: 138 MMOL/L — SIGNIFICANT CHANGE UP (ref 135–145)
WBC # BLD: 7.32 K/UL — SIGNIFICANT CHANGE UP (ref 3.8–10.5)
WBC # BLD: 7.32 K/UL — SIGNIFICANT CHANGE UP (ref 3.8–10.5)
WBC # FLD AUTO: 7.32 K/UL — SIGNIFICANT CHANGE UP (ref 3.8–10.5)
WBC # FLD AUTO: 7.32 K/UL — SIGNIFICANT CHANGE UP (ref 3.8–10.5)

## 2018-05-15 PROCEDURE — 93306 TTE W/DOPPLER COMPLETE: CPT | Mod: 26

## 2018-05-15 RX ADMIN — Medication 25 MILLIGRAM(S): at 05:23

## 2018-05-15 RX ADMIN — PANTOPRAZOLE SODIUM 40 MILLIGRAM(S): 20 TABLET, DELAYED RELEASE ORAL at 05:24

## 2018-05-15 RX ADMIN — Medication 30 MILLILITER(S): at 00:36

## 2018-05-15 RX ADMIN — Medication 1 GRAM(S): at 17:14

## 2018-05-15 RX ADMIN — HEPARIN SODIUM 5000 UNIT(S): 5000 INJECTION INTRAVENOUS; SUBCUTANEOUS at 05:24

## 2018-05-15 RX ADMIN — PANTOPRAZOLE SODIUM 40 MILLIGRAM(S): 20 TABLET, DELAYED RELEASE ORAL at 17:14

## 2018-05-15 RX ADMIN — ISOSORBIDE MONONITRATE 30 MILLIGRAM(S): 60 TABLET, EXTENDED RELEASE ORAL at 11:13

## 2018-05-15 RX ADMIN — HEPARIN SODIUM 5000 UNIT(S): 5000 INJECTION INTRAVENOUS; SUBCUTANEOUS at 17:14

## 2018-05-15 RX ADMIN — ATORVASTATIN CALCIUM 80 MILLIGRAM(S): 80 TABLET, FILM COATED ORAL at 21:17

## 2018-05-15 RX ADMIN — Medication 100 MILLIGRAM(S): at 17:14

## 2018-05-15 RX ADMIN — Medication 1 GRAM(S): at 05:26

## 2018-05-15 RX ADMIN — Medication 1 GRAM(S): at 11:13

## 2018-05-15 RX ADMIN — CLOPIDOGREL BISULFATE 75 MILLIGRAM(S): 75 TABLET, FILM COATED ORAL at 11:13

## 2018-05-15 RX ADMIN — Medication 1 GRAM(S): at 23:51

## 2018-05-15 RX ADMIN — Medication 30 MILLILITER(S): at 21:17

## 2018-05-15 RX ADMIN — AMLODIPINE BESYLATE 5 MILLIGRAM(S): 2.5 TABLET ORAL at 05:23

## 2018-05-15 RX ADMIN — Medication 81 MILLIGRAM(S): at 11:13

## 2018-05-15 RX ADMIN — FEBUXOSTAT 80 MILLIGRAM(S): 40 TABLET ORAL at 11:13

## 2018-05-15 RX ADMIN — Medication 3 MILLIGRAM(S): at 21:17

## 2018-05-15 RX ADMIN — Medication 100 MILLIGRAM(S): at 05:23

## 2018-05-15 NOTE — PROGRESS NOTE ADULT - SUBJECTIVE AND OBJECTIVE BOX
No pain, no shortness of breath      VITAL:  T(C): , Max: 36.8 (05-14-18 @ 22:40)  T(F): , Max: 98.3 (05-14-18 @ 22:40)  HR: 69 (05-15-18 @ 11:10)  BP: 122/79 (05-15-18 @ 11:10)  BP(mean): --  RR: 16 (05-15-18 @ 05:21)  SpO2: 100% (05-15-18 @ 05:21)      PHYSICAL EXAM:  Constitutional: NAD, Alert; frail  HEENT: NCAT, DMM  Neck: Supple, No JVD  Respiratory: CTA-b/l  Cardiovascular: RRR s1s2, no m/r/g  Gastrointestinal: BS+, soft, NT/ND  Extremities: No peripheral edema b/l  Neurological: no focal deficits; strength grossly intact  Psychiatric: Normal mood, normal affect  Back: no CVAT b/l  Skin: No rashes, no nevi , (+)scattered large tophi      LABS:                        12.7   7.32  )-----------( 215      ( 15 May 2018 06:40 )             40.1     Na(138)/K(4.0)/Cl(106)/HCO3(20)/BUN(16)/Cr(1.45)Glu(100)/Ca(8.9)/Mg(1.6)/PO4(--)    05-15 @ 06:40  Na(136)/K(4.4)/Cl(103)/HCO3(20)/BUN(20)/Cr(1.55)Glu(76)/Ca(9.1)/Mg(1.7)/PO4(2.7)    05-14 @ 06:42  Na(135)/K(4.7)/Cl(104)/HCO3(18)/BUN(22)/Cr(1.79)Glu(90)/Ca(8.9)/Mg(1.9)/PO4(--)    05-13 @ 04:00      IMPRESSION: 62M w/ HTN, HLD, gout, CKD3, and CAD-CABG, s/p recent admission for PCI, and 5/12/18 returned with chest pain    (1)Renal - CKD3 - nonproteinuric. Numbers fluctuating based on hemodynamic status. GFR 30-40ml/min.    (2)Hyperkalemia -now improved, off ACEI, and s/p Kayexalate.     (3)Metabolic acidosis - improved    (4)Chest pain - ruled out for PE by VQ scan      RECOMMEND:  (1)No ACEI/ARB for now  (2)Can discontinue dietary K+ restriction for now; patient counseled on high-K+ foods to limit at home  (3)D/C planning per primary team; could f/u at my office in 2-4 weeks after discharge              Jono Andino MD  [facility]  [phone] No pain, no shortness of breath      VITAL:  T(C): , Max: 36.8 (05-14-18 @ 22:40)  T(F): , Max: 98.3 (05-14-18 @ 22:40)  HR: 69 (05-15-18 @ 11:10)  BP: 122/79 (05-15-18 @ 11:10)  BP(mean): --  RR: 16 (05-15-18 @ 05:21)  SpO2: 100% (05-15-18 @ 05:21)      PHYSICAL EXAM:  Constitutional: NAD, Alert; frail  HEENT: NCAT, DMM  Neck: Supple, No JVD  Respiratory: CTA-b/l  Cardiovascular: RRR s1s2, no m/r/g  Gastrointestinal: BS+, soft, NT/ND  Extremities: No peripheral edema b/l  Neurological: no focal deficits; strength grossly intact  Psychiatric: Normal mood, normal affect  Back: no CVAT b/l  Skin: No rashes, no nevi , (+)scattered large tophi      LABS:                        12.7   7.32  )-----------( 215      ( 15 May 2018 06:40 )             40.1     Na(138)/K(4.0)/Cl(106)/HCO3(20)/BUN(16)/Cr(1.45)Glu(100)/Ca(8.9)/Mg(1.6)/PO4(--)    05-15 @ 06:40  Na(136)/K(4.4)/Cl(103)/HCO3(20)/BUN(20)/Cr(1.55)Glu(76)/Ca(9.1)/Mg(1.7)/PO4(2.7)    05-14 @ 06:42  Na(135)/K(4.7)/Cl(104)/HCO3(18)/BUN(22)/Cr(1.79)Glu(90)/Ca(8.9)/Mg(1.9)/PO4(--)    05-13 @ 04:00      IMPRESSION: 62M w/ HTN, HLD, gout, CKD3, and CAD-CABG, s/p recent admission for PCI, and 5/12/18 returned with chest pain    (1)Renal - CKD3 - nonproteinuric. Numbers fluctuating based on hemodynamic status. GFR 30-40ml/min.    (2)Hyperkalemia -now improved, off ACEI, and s/p Kayexalate.     (3)Metabolic acidosis - improved    (4)Chest pain - ruled out for PE by VQ scan      RECOMMEND:  (1)No ACEI/ARB for now  (2)Can discontinue dietary K+ restriction for now; patient counseled on high-K+ foods to limit at home  (3)F/U TTE                Jono Andino MD  [facility]  [phone]

## 2018-05-15 NOTE — PROGRESS NOTE ADULT - SUBJECTIVE AND OBJECTIVE BOX
Patient is a 62y old  Male who presents with a chief complaint of chest pain (12 May 2018 02:53)      INTERVAL HPI/OVERNIGHT EVENTS:  T(C): 37.1 (05-15-18 @ 12:38), Max: 37.1 (05-15-18 @ 12:38)  HR: 63 (05-15-18 @ 12:38) (63 - 77)  BP: 113/73 (05-15-18 @ 12:38) (113/73 - 123/79)  RR: 16 (05-15-18 @ 12:38) (16 - 18)  SpO2: 100% (05-15-18 @ 12:38) (100% - 100%)  Wt(kg): --  I&O's Summary    14 May 2018 07:01  -  15 May 2018 07:00  --------------------------------------------------------  IN: 360 mL / OUT: 0 mL / NET: 360 mL        LABS:                        12.7   7.32  )-----------( 215      ( 15 May 2018 06:40 )             40.1     05-15    138  |  106  |  16  ----------------------------<  100<H>  4.0   |  20<L>  |  1.45<H>    Ca    8.9      15 May 2018 06:40  Phos  2.7     05-14  Mg     1.6     05-15      PTT - ( 14 May 2018 06:42 )  PTT:29.8 SEC    CAPILLARY BLOOD GLUCOSE                MEDICATIONS  (STANDING):  amLODIPine   Tablet 5 milliGRAM(s) Oral daily  aspirin enteric coated 81 milliGRAM(s) Oral daily  atorvastatin 80 milliGRAM(s) Oral at bedtime  clopidogrel Tablet 75 milliGRAM(s) Oral daily  docusate sodium 100 milliGRAM(s) Oral two times a day  febuxostat 80 milliGRAM(s) Oral daily  heparin  Injectable 5000 Unit(s) SubCutaneous every 12 hours  isosorbide   mononitrate ER Tablet (IMDUR) 30 milliGRAM(s) Oral daily  metoprolol succinate ER 25 milliGRAM(s) Oral daily  pantoprazole  Injectable 40 milliGRAM(s) IV Push two times a day  senna 2 Tablet(s) Oral at bedtime  sucralfate 1 Gram(s) Oral four times a day    MEDICATIONS  (PRN):  aluminum hydroxide/magnesium hydroxide/simethicone Suspension 30 milliLiter(s) Oral every 6 hours PRN Dyspepsia  melatonin 3 milliGRAM(s) Oral at bedtime PRN Sleep          PHYSICAL EXAM:  GENERAL: NAD, well-groomed, well-developed  HEAD:  Atraumatic, Normocephalic  CHEST/LUNG: Clear to percussion bilaterally; No rales, rhonchi, wheezing, or rubs  HEART: Regular rate and rhythm; No murmurs, rubs, or gallops  ABDOMEN: Soft, Nontender, Nondistended; Bowel sounds present  EXTREMITIES:  2+ Peripheral Pulses, No clubbing, cyanosis, or edema  LYMPH: No lymphadenopathy noted  SKIN: No rashes or lesions    Care Discussed with Consultants/Other Providers [ ] YES  [ ] NO

## 2018-05-15 NOTE — PROGRESS NOTE ADULT - SUBJECTIVE AND OBJECTIVE BOX
INTERVAL HPI/OVERNIGHT EVENTS:    pt with dyspepsia overnight into this morning; improved with prn maalox  denied bm this morning  ate breakfast     MEDICATIONS  (STANDING):  amLODIPine   Tablet 5 milliGRAM(s) Oral daily  aspirin enteric coated 81 milliGRAM(s) Oral daily  atorvastatin 80 milliGRAM(s) Oral at bedtime  clopidogrel Tablet 75 milliGRAM(s) Oral daily  docusate sodium 100 milliGRAM(s) Oral two times a day  febuxostat 80 milliGRAM(s) Oral daily  heparin  Injectable 5000 Unit(s) SubCutaneous every 12 hours  isosorbide   mononitrate ER Tablet (IMDUR) 30 milliGRAM(s) Oral daily  metoprolol succinate ER 25 milliGRAM(s) Oral daily  pantoprazole  Injectable 40 milliGRAM(s) IV Push two times a day  senna 2 Tablet(s) Oral at bedtime  sucralfate 1 Gram(s) Oral four times a day    MEDICATIONS  (PRN):  aluminum hydroxide/magnesium hydroxide/simethicone Suspension 30 milliLiter(s) Oral every 6 hours PRN Dyspepsia  melatonin 3 milliGRAM(s) Oral at bedtime PRN Sleep      Allergies    No Known Allergies    Intolerances        Review of Systems:    General:  No wt loss, fevers, chills, night sweats, fatigue   Eyes:  Good vision, no reported pain  ENT:  No sore throat, pain, runny nose, dysphagia  CV:  No pain, palpitations, hypo/hypertension  Resp:  No dyspnea, cough, tachypnea, wheezing  GI:  No pain, No nausea, No vomiting, No diarrhea, No constipation, No weight loss, No fever, No pruritis, No rectal bleeding, No melena, No dysphagia; +heartburn  :  No pain, bleeding, incontinence, nocturia  Muscle:  No pain, weakness  Neuro:  No weakness, tingling, memory problems  Psych:  No fatigue, insomnia, mood problems, depression  Endocrine:  No polyuria, polydypsia, cold/heat intolerance  Heme:  No petechiae, ecchymosis, easy bruisability  Skin:  No rash, tattoos, scars, edema      Vital Signs Last 24 Hrs  T(C): 36.5 (15 May 2018 05:21), Max: 36.8 (14 May 2018 22:40)  T(F): 97.7 (15 May 2018 05:21), Max: 98.3 (14 May 2018 22:40)  HR: 72 (15 May 2018 05:21) (68 - 77)  BP: 121/81 (15 May 2018 05:21) (117/74 - 123/79)  BP(mean): --  RR: 16 (15 May 2018 05:21) (16 - 18)  SpO2: 100% (15 May 2018 05:21) (100% - 100%)    PHYSICAL EXAM:    Constitutional: NAD  HEENT: EOMI, throat clear  Neck: No LAD, supple  Respiratory: CTA and P  Cardiovascular: S1 and S2, RRR, no M  Gastrointestinal: BS+, soft, NT/ND, neg HSM,  Extremities: No peripheral edema, neg clubbing, cyanosis  Vascular: 2+ peripheral pulses  Neurological: A/O x 3, no focal deficits  Psychiatric: Normal mood, normal affect  Skin: No rashes      LABS:                        12.7   7.32  )-----------( 215      ( 15 May 2018 06:40 )             40.1     05-15    138  |  106  |  16  ----------------------------<  100<H>  4.0   |  20<L>  |  1.45<H>    Ca    8.9      15 May 2018 06:40  Phos  2.7     05-14  Mg     1.6     05-15      PTT - ( 14 May 2018 06:42 )  PTT:29.8 SEC      RADIOLOGY & ADDITIONAL TESTS:

## 2018-05-15 NOTE — PROGRESS NOTE ADULT - SUBJECTIVE AND OBJECTIVE BOX
No anginal chest pain. no dyspnea  ROS otherwise negative         MEDICATIONS  (STANDING):  amLODIPine   Tablet 5 milliGRAM(s) Oral daily  aspirin enteric coated 81 milliGRAM(s) Oral daily  atorvastatin 80 milliGRAM(s) Oral at bedtime  clopidogrel Tablet 75 milliGRAM(s) Oral daily  docusate sodium 100 milliGRAM(s) Oral two times a day  febuxostat 80 milliGRAM(s) Oral daily  heparin  Injectable 5000 Unit(s) SubCutaneous every 12 hours  isosorbide   mononitrate ER Tablet (IMDUR) 30 milliGRAM(s) Oral daily  metoprolol succinate ER 25 milliGRAM(s) Oral daily  pantoprazole  Injectable 40 milliGRAM(s) IV Push two times a day  senna 2 Tablet(s) Oral at bedtime  sucralfate 1 Gram(s) Oral four times a day    MEDICATIONS  (PRN):  aluminum hydroxide/magnesium hydroxide/simethicone Suspension 30 milliLiter(s) Oral every 6 hours PRN Dyspepsia  melatonin 3 milliGRAM(s) Oral at bedtime PRN Sleep      LABS:                        12.7   7.32  )-----------( 215      ( 15 May 2018 06:40 )             40.1     Hemoglobin: 12.7 g/dL (05-15 @ 06:40)  Hemoglobin: 12.7 g/dL (05-15 @ 06:40)  Hemoglobin: 12.8 g/dL (05-14 @ 06:42)  Hemoglobin: 12.6 g/dL (05-13 @ 04:00)  Hemoglobin: 12.6 g/dL (05-12 @ 07:28)    05-15    138  |  106  |  16  ----------------------------<  100<H>  4.0   |  20<L>  |  1.45<H>    Ca    8.9      15 May 2018 06:40  Phos  2.7     05-14  Mg     1.6     05-15      Creatinine Trend: 1.45<--, 1.55<--, 1.79<--, 1.87<--, 1.95<--, 1.43<--           PHYSICAL EXAM  Vital Signs Last 24 Hrs  T(C): 36.5 (15 May 2018 05:21), Max: 36.8 (14 May 2018 22:40)  T(F): 97.7 (15 May 2018 05:21), Max: 98.3 (14 May 2018 22:40)  HR: 69 (15 May 2018 11:10) (68 - 77)  BP: 122/79 (15 May 2018 11:10) (117/74 - 123/79)  BP(mean): --  RR: 16 (15 May 2018 05:21) (16 - 18)  SpO2: 100% (15 May 2018 05:21) (100% - 100%)      Cardiovascular:  S1S2 RRR, No JVD  Respiratory: Lungs clear to auscultation, normal effort  Gastrointestinal: Abdomen soft, ND, NT, +BS  Skin: Warm, dry, intact. No rash.  Musculoskeletal: Normal ROM, normal strength  Ext: No C/C/E B/L LE    DIAGNOSTIC DATA  TELEMETRY: NSR    < from: Cardiac Cath Lab - Adult (05.09.18 @ 15:23) >  VENTRICLES: No left ventriculogram was performed.  CORONARY VESSELS:  LM:   --  Distal left main: There was a discrete 30 % stenosis at a site  with no prior intervention. The lesion was concentric. There was HARINI  grade 3 flow through the vessel (brisk flow).  LAD:   --  Proximal LAD: There was a tubular 70 % stenosis at a site with  no prior intervention. The lesion was eccentric. There was HARINI grade 3  flow through the vessel (brisk flow) and a large vascular territory distal  to the lesion. This is a likely culprit for the patient's clinical  presentation. An intervention was performed.iFR 0.88  --  Mid LAD: Mild Myocardial bridging was present. There was a discrete 50  % stenosis at a site with no prior intervention. The lesion was eccentric.  There was HARINI grade 3 flow through the vessel (brisk flow).  COMPLICATIONS: There were no complications.  DIAGNOSTIC IMPRESSIONS: Successful PCI to severe stenosis of proximal LAD  using 3.5mm Giancarlo LEE ANN  Post PCI iFR was 0.92 (this is related to moderate left main and moderate  mid LAD stenosis)  DIAGNOSTIC RECOMMENDATIONS: DAPT x 12 months  Aggressive risk factor modification  INTERVENTIONAL IMPRESSIONS: Successful PCI to severe stenosis of proximal  LAD using 3.5mm H[place]ie LEE ANN  Post PCI iFR was 0.92 (this is related to moderate left main and moderate  mid LAD stenosis)  INTERVENTIONAL RECOMMENDATIONS: DAPT x 12 months  Aggressive risk factor modification  Prepared and signed by  Willow Guthrie M.D.  Signed 05/10/2018 11:07:40    < end of copied text >    < from: [facility] Pulmonary Ventilation/Perfusion Scan (05.13.18 @ 10:16) >  IMPRESSION: Abnormal ventilation/perfusion lung scan. Very low   probability of pulmonary embolus.    < end of copied text >        ASSESSMENT AND PLAN:  62 year old Male w/ hx of CAD s/p CABG in 2015, s/p  PCI 2016, admitted to us last week with Chest pain, s/p recent normal NST as OP per patient, s/p cath and staged PCI to pLAD on 5/9, HTN, HLD, Gout, congential hydrocephalus, who is readmitted with atypical chest pain    --ACS ruled out with serial CE  --VQ scan very low prob for PE  --GI consulted --recommend IV PPI, Carafate and Maalox, monitor stools and monitor H/H  --repeat TTE pending, r/o pericardial effusion/pericarditis    Flora Webster RPA-C

## 2018-05-16 ENCOUNTER — TRANSCRIPTION ENCOUNTER (OUTPATIENT)
Age: 63
End: 2018-05-16

## 2018-05-16 VITALS
HEART RATE: 79 BPM | SYSTOLIC BLOOD PRESSURE: 129 MMHG | RESPIRATION RATE: 17 BRPM | OXYGEN SATURATION: 100 % | DIASTOLIC BLOOD PRESSURE: 84 MMHG | TEMPERATURE: 99 F

## 2018-05-16 LAB
BASOPHILS # BLD AUTO: 0.04 K/UL — SIGNIFICANT CHANGE UP (ref 0–0.2)
BASOPHILS NFR BLD AUTO: 0.4 % — SIGNIFICANT CHANGE UP (ref 0–2)
BUN SERPL-MCNC: 17 MG/DL — SIGNIFICANT CHANGE UP (ref 7–23)
CALCIUM SERPL-MCNC: 9.5 MG/DL — SIGNIFICANT CHANGE UP (ref 8.4–10.5)
CHLORIDE SERPL-SCNC: 103 MMOL/L — SIGNIFICANT CHANGE UP (ref 98–107)
CO2 SERPL-SCNC: 22 MMOL/L — SIGNIFICANT CHANGE UP (ref 22–31)
CREAT SERPL-MCNC: 1.29 MG/DL — SIGNIFICANT CHANGE UP (ref 0.5–1.3)
EOSINOPHIL # BLD AUTO: 0.57 K/UL — HIGH (ref 0–0.5)
EOSINOPHIL NFR BLD AUTO: 6.3 % — HIGH (ref 0–6)
GLUCOSE SERPL-MCNC: 83 MG/DL — SIGNIFICANT CHANGE UP (ref 70–99)
HCT VFR BLD CALC: 39.8 % — SIGNIFICANT CHANGE UP (ref 39–50)
HCT VFR BLD CALC: 39.8 % — SIGNIFICANT CHANGE UP (ref 39–50)
HGB BLD-MCNC: 12.9 G/DL — LOW (ref 13–17)
HGB BLD-MCNC: 12.9 G/DL — LOW (ref 13–17)
IMM GRANULOCYTES # BLD AUTO: 0.05 # — SIGNIFICANT CHANGE UP
IMM GRANULOCYTES NFR BLD AUTO: 0.6 % — SIGNIFICANT CHANGE UP (ref 0–1.5)
LYMPHOCYTES # BLD AUTO: 1.97 K/UL — SIGNIFICANT CHANGE UP (ref 1–3.3)
LYMPHOCYTES # BLD AUTO: 21.7 % — SIGNIFICANT CHANGE UP (ref 13–44)
MAGNESIUM SERPL-MCNC: 1.5 MG/DL — LOW (ref 1.6–2.6)
MCHC RBC-ENTMCNC: 28 PG — SIGNIFICANT CHANGE UP (ref 27–34)
MCHC RBC-ENTMCNC: 28 PG — SIGNIFICANT CHANGE UP (ref 27–34)
MCHC RBC-ENTMCNC: 32.4 % — SIGNIFICANT CHANGE UP (ref 32–36)
MCHC RBC-ENTMCNC: 32.4 % — SIGNIFICANT CHANGE UP (ref 32–36)
MCV RBC AUTO: 86.3 FL — SIGNIFICANT CHANGE UP (ref 80–100)
MCV RBC AUTO: 86.3 FL — SIGNIFICANT CHANGE UP (ref 80–100)
MONOCYTES # BLD AUTO: 0.97 K/UL — HIGH (ref 0–0.9)
MONOCYTES NFR BLD AUTO: 10.7 % — SIGNIFICANT CHANGE UP (ref 2–14)
NEUTROPHILS # BLD AUTO: 5.49 K/UL — SIGNIFICANT CHANGE UP (ref 1.8–7.4)
NEUTROPHILS NFR BLD AUTO: 60.3 % — SIGNIFICANT CHANGE UP (ref 43–77)
NRBC # FLD: 0 — SIGNIFICANT CHANGE UP
NRBC # FLD: 0 — SIGNIFICANT CHANGE UP
PLATELET # BLD AUTO: 226 K/UL — SIGNIFICANT CHANGE UP (ref 150–400)
PLATELET # BLD AUTO: 226 K/UL — SIGNIFICANT CHANGE UP (ref 150–400)
PMV BLD: 9.9 FL — SIGNIFICANT CHANGE UP (ref 7–13)
PMV BLD: 9.9 FL — SIGNIFICANT CHANGE UP (ref 7–13)
POTASSIUM SERPL-MCNC: 4.1 MMOL/L — SIGNIFICANT CHANGE UP (ref 3.5–5.3)
POTASSIUM SERPL-SCNC: 4.1 MMOL/L — SIGNIFICANT CHANGE UP (ref 3.5–5.3)
RBC # BLD: 4.61 M/UL — SIGNIFICANT CHANGE UP (ref 4.2–5.8)
RBC # BLD: 4.61 M/UL — SIGNIFICANT CHANGE UP (ref 4.2–5.8)
RBC # FLD: 14.4 % — SIGNIFICANT CHANGE UP (ref 10.3–14.5)
RBC # FLD: 14.4 % — SIGNIFICANT CHANGE UP (ref 10.3–14.5)
SODIUM SERPL-SCNC: 138 MMOL/L — SIGNIFICANT CHANGE UP (ref 135–145)
WBC # BLD: 9.09 K/UL — SIGNIFICANT CHANGE UP (ref 3.8–10.5)
WBC # BLD: 9.09 K/UL — SIGNIFICANT CHANGE UP (ref 3.8–10.5)
WBC # FLD AUTO: 9.09 K/UL — SIGNIFICANT CHANGE UP (ref 3.8–10.5)
WBC # FLD AUTO: 9.09 K/UL — SIGNIFICANT CHANGE UP (ref 3.8–10.5)

## 2018-05-16 PROCEDURE — 71100 X-RAY EXAM RIBS UNI 2 VIEWS: CPT | Mod: 26,LT

## 2018-05-16 RX ORDER — ISOSORBIDE MONONITRATE 60 MG/1
3 TABLET, EXTENDED RELEASE ORAL
Qty: 0 | Refills: 0 | COMMUNITY

## 2018-05-16 RX ORDER — SENNA PLUS 8.6 MG/1
2 TABLET ORAL
Qty: 0 | Refills: 0 | DISCHARGE
Start: 2018-05-16

## 2018-05-16 RX ORDER — DOCUSATE SODIUM 100 MG
1 CAPSULE ORAL
Qty: 0 | Refills: 0 | DISCHARGE
Start: 2018-05-16

## 2018-05-16 RX ORDER — MAGNESIUM OXIDE 400 MG ORAL TABLET 241.3 MG
400 TABLET ORAL
Qty: 0 | Refills: 0 | Status: DISCONTINUED | OUTPATIENT
Start: 2018-05-16 | End: 2018-05-16

## 2018-05-16 RX ORDER — SUCRALFATE 1 G
1 TABLET ORAL
Qty: 120 | Refills: 0
Start: 2018-05-16 | End: 2018-06-14

## 2018-05-16 RX ORDER — ISOSORBIDE MONONITRATE 60 MG/1
1 TABLET, EXTENDED RELEASE ORAL
Qty: 0 | Refills: 0 | DISCHARGE
Start: 2018-05-16

## 2018-05-16 RX ORDER — PANTOPRAZOLE SODIUM 20 MG/1
1 TABLET, DELAYED RELEASE ORAL
Qty: 60 | Refills: 0
Start: 2018-05-16 | End: 2018-06-14

## 2018-05-16 RX ORDER — LISINOPRIL 2.5 MG/1
1 TABLET ORAL
Qty: 0 | Refills: 0 | COMMUNITY

## 2018-05-16 RX ADMIN — Medication 81 MILLIGRAM(S): at 11:27

## 2018-05-16 RX ADMIN — FEBUXOSTAT 80 MILLIGRAM(S): 40 TABLET ORAL at 11:29

## 2018-05-16 RX ADMIN — Medication 100 MILLIGRAM(S): at 17:05

## 2018-05-16 RX ADMIN — Medication 100 MILLIGRAM(S): at 05:25

## 2018-05-16 RX ADMIN — Medication 25 MILLIGRAM(S): at 05:25

## 2018-05-16 RX ADMIN — HEPARIN SODIUM 5000 UNIT(S): 5000 INJECTION INTRAVENOUS; SUBCUTANEOUS at 17:04

## 2018-05-16 RX ADMIN — PANTOPRAZOLE SODIUM 40 MILLIGRAM(S): 20 TABLET, DELAYED RELEASE ORAL at 17:03

## 2018-05-16 RX ADMIN — CLOPIDOGREL BISULFATE 75 MILLIGRAM(S): 75 TABLET, FILM COATED ORAL at 11:31

## 2018-05-16 RX ADMIN — AMLODIPINE BESYLATE 5 MILLIGRAM(S): 2.5 TABLET ORAL at 05:25

## 2018-05-16 RX ADMIN — Medication 30 MILLILITER(S): at 11:31

## 2018-05-16 RX ADMIN — Medication 1 GRAM(S): at 05:25

## 2018-05-16 RX ADMIN — MAGNESIUM OXIDE 400 MG ORAL TABLET 400 MILLIGRAM(S): 241.3 TABLET ORAL at 17:04

## 2018-05-16 RX ADMIN — PANTOPRAZOLE SODIUM 40 MILLIGRAM(S): 20 TABLET, DELAYED RELEASE ORAL at 05:25

## 2018-05-16 RX ADMIN — Medication 1 GRAM(S): at 11:30

## 2018-05-16 RX ADMIN — ISOSORBIDE MONONITRATE 30 MILLIGRAM(S): 60 TABLET, EXTENDED RELEASE ORAL at 11:29

## 2018-05-16 RX ADMIN — HEPARIN SODIUM 5000 UNIT(S): 5000 INJECTION INTRAVENOUS; SUBCUTANEOUS at 05:25

## 2018-05-16 RX ADMIN — Medication 1 GRAM(S): at 17:04

## 2018-05-16 RX ADMIN — MAGNESIUM OXIDE 400 MG ORAL TABLET 400 MILLIGRAM(S): 241.3 TABLET ORAL at 09:37

## 2018-05-16 NOTE — PROGRESS NOTE ADULT - PROVIDER SPECIALTY LIST ADULT
Cardiology
Gastroenterology
Hospitalist
Nephrology
Cardiology
Gastroenterology

## 2018-05-16 NOTE — DISCHARGE NOTE ADULT - SECONDARY DIAGNOSIS.
HLD (hyperlipidemia) HTN (hypertension) CAD (coronary artery disease) CKD (chronic kidney disease), stage III

## 2018-05-16 NOTE — DISCHARGE NOTE ADULT - CARE PLAN
Principal Discharge DX:	Atypical chest pain  Assessment and plan of treatment:	Follow up with your primary care physician for further monitoring in 1-2 weeks. Please call to arrange appointment. Follow up with your gastroenterologist Dr. Wadsworth for further monitoring in 1-2 weeks, please call to arrange appointment for endoscopy as outpatient.  Secondary Diagnosis:	HLD (hyperlipidemia)  Goal:	Continue Lipitor at bedtime. Goal LDL <100. Low cholesterol diet.  Assessment and plan of treatment:	Follow up with your primary care physician for further monitoring in 1-2 weeks. Please call to arrange appointment.  Secondary Diagnosis:	HTN (hypertension)  Goal:	Ensure compliance with your medications as directed to maintain goal /80mmhg. Low salt diet.  Assessment and plan of treatment:	Follow up with your primary care physician for further monitoring in 1-2 weeks. Please call to arrange appointment.  Secondary Diagnosis:	CAD (coronary artery disease)  Goal:	Ensure compliance with your medications (Aspirin, Plavix, Metoprolol, and Lipitor) as directed to prevent progression of coronary artery disease.  Assessment and plan of treatment:	Follow up with your Cardiologist for further monitoring in 1-2 weeks. Please call to arrange appointment.  Secondary Diagnosis:	CKD (chronic kidney disease), stage III  Goal:	Prevent worsening kidney disease.  Assessment and plan of treatment:	Follow up with your nephrologist Dr. Andino for further monitoring in 1-2 weeks. Please call to arrange appointment. Avoid medications that may be harmful to your kidneys such as NSAID pain relievers (Advil, Aleve, Motrin, etc.) Use Tylenol for pain if needed. Avoid contrast if you require any medical testing. Principal Discharge DX:	Atypical chest pain  Goal:	Ensure compliance with your medications as directed and follow up as noted below.  Assessment and plan of treatment:	Follow up with your Cardiologist for further monitoring in 1-2 weeks. Please call to arrange appointment. Follow up with your gastroenterologist Dr. Wadsworth for further monitoring in 1-2 weeks, please call to arrange appointment for endoscopy as outpatient.  Secondary Diagnosis:	HLD (hyperlipidemia)  Goal:	Continue Lipitor at bedtime. Goal LDL <100. Low cholesterol diet.  Assessment and plan of treatment:	Follow up with your primary care physician for further monitoring in 1-2 weeks. Please call to arrange appointment.  Secondary Diagnosis:	HTN (hypertension)  Goal:	Ensure compliance with your medications as directed to maintain goal /80mmhg. Low salt diet. Lisinopril was discontinued.  Assessment and plan of treatment:	Follow up with your primary care physician for further monitoring in 1-2 weeks. Please call to arrange appointment.  Secondary Diagnosis:	CAD (coronary artery disease)  Goal:	Ensure compliance with your medications (Aspirin, Plavix, Metoprolol, and Lipitor) as directed to prevent progression of coronary artery disease.  Assessment and plan of treatment:	Follow up with your Cardiologist for further monitoring in 1-2 weeks. Please call to arrange appointment.  Secondary Diagnosis:	CKD (chronic kidney disease), stage III  Goal:	Prevent worsening kidney disease. Lisinopril was stopped.  Assessment and plan of treatment:	Follow up with your nephrologist Dr. Andino for further monitoring in 1-2 weeks. Please call to arrange appointment. Avoid medications that may be harmful to your kidneys such as NSAID pain relievers (Advil, Aleve, Motrin, etc.) Use Tylenol for pain if needed. Avoid contrast if you require any medical testing.

## 2018-05-16 NOTE — DISCHARGE NOTE ADULT - CARE PROVIDER_API CALL
Gabriel Wadsworth (), Gastroenterology; Internal Medicine  [address] [place] [address]  Phone: [phone]  Fax: [phone]    Jono Andino), Internal Medicine; Nephrology  [address] [facility] [address]  [place] [address]  Phone: [phone]  Fax: [phone]    Gary Grace), Cardiovascular Disease; Internal Medicine; Interventional Cardiology  [address] [place] [address]  Phone: [phone]  Fax: [phone]

## 2018-05-16 NOTE — DISCHARGE NOTE ADULT - MEDICATION SUMMARY - MEDICATIONS TO CHANGE
I will SWITCH the dose or number of times a day I take the medications listed below when I get home from the hospital:    isosorbide mononitrate 30 mg oral tablet, extended release  -- 3 tab(s) by mouth once a day (in the morning)

## 2018-05-16 NOTE — PROGRESS NOTE ADULT - PROBLEM SELECTOR PLAN 1
- cbc stable  - transfuse prn   - continue protonix 40mg IVP BID change to PO on discharge  - continue carafate 1g PO QID   - maalox prn   - manage medically and defer endoscopic evaluation at this time [facility]; will plan for outpatient follow up for eventual EGD  - no gi objection to dc planning per primary team
- cbc stable  - transfuse prn   - continue protonix 40mg IVP BID change to PO on discharge  - continue carafate 1g PO QID   - maalox prn   - manage medically and defer endoscopic evaluation at this time [facility]; will plan for outpatient follow up for eventual EGD

## 2018-05-16 NOTE — PROGRESS NOTE ADULT - SUBJECTIVE AND OBJECTIVE BOX
INTERVAL HPI/OVERNIGHT EVENTS:    pt reports some improvement in epigastric discomfort  no n/v  ate  brown bm this morning     MEDICATIONS  (STANDING):  amLODIPine   Tablet 5 milliGRAM(s) Oral daily  aspirin enteric coated 81 milliGRAM(s) Oral daily  atorvastatin 80 milliGRAM(s) Oral at bedtime  clopidogrel Tablet 75 milliGRAM(s) Oral daily  docusate sodium 100 milliGRAM(s) Oral two times a day  febuxostat 80 milliGRAM(s) Oral daily  heparin  Injectable 5000 Unit(s) SubCutaneous every 12 hours  isosorbide   mononitrate ER Tablet (IMDUR) 30 milliGRAM(s) Oral daily  magnesium oxide 400 milliGRAM(s) Oral two times a day with meals  metoprolol succinate ER 25 milliGRAM(s) Oral daily  pantoprazole  Injectable 40 milliGRAM(s) IV Push two times a day  senna 2 Tablet(s) Oral at bedtime  sucralfate 1 Gram(s) Oral four times a day    MEDICATIONS  (PRN):  aluminum hydroxide/magnesium hydroxide/simethicone Suspension 30 milliLiter(s) Oral every 6 hours PRN Dyspepsia  melatonin 3 milliGRAM(s) Oral at bedtime PRN Sleep      Allergies    No Known Allergies    Intolerances        Review of Systems:    General:  No wt loss, fevers, chills, night sweats, fatigue   Eyes:  Good vision, no reported pain  ENT:  No sore throat, pain, runny nose, dysphagia  CV:  No pain, palpitations, hypo/hypertension  Resp:  No dyspnea, cough, tachypnea, wheezing  GI:  No pain, No nausea, No vomiting, No diarrhea, No constipation, No weight loss, No fever, No pruritis, No rectal bleeding, No melena, No dysphagia  :  No pain, bleeding, incontinence, nocturia  Muscle:  No pain, weakness  Neuro:  No weakness, tingling, memory problems  Psych:  No fatigue, insomnia, mood problems, depression  Endocrine:  No polyuria, polydypsia, cold/heat intolerance  Heme:  No petechiae, ecchymosis, easy bruisability  Skin:  No rash, tattoos, scars, edema      Vital Signs Last 24 Hrs  T(C): 36.8 (16 May 2018 07:56), Max: 37.1 (15 May 2018 12:38)  T(F): 98.2 (16 May 2018 07:56), Max: 98.8 (15 May 2018 12:38)  HR: 71 (16 May 2018 07:56) (63 - 74)  BP: 125/86 (16 May 2018 07:56) (113/73 - 133/81)  BP(mean): --  RR: 16 (16 May 2018 07:56) (16 - 16)  SpO2: 100% (16 May 2018 07:56) (100% - 100%)    PHYSICAL EXAM:    Constitutional: NAD  HEENT: EOMI, throat clear  Neck: No LAD, supple  Respiratory: CTA and P  Cardiovascular: S1 and S2, RRR, no M  Gastrointestinal: BS+, soft, NT/ND, neg HSM,  Extremities: No peripheral edema, neg clubbing, cyanosis  Vascular: 2+ peripheral pulses  Neurological: A/O x 3, no focal deficits  Psychiatric: Normal mood, normal affect  Skin: No rashes      LABS:                        12.9   9.09  )-----------( 226      ( 16 May 2018 07:00 )             39.8     05-16    138  |  103  |  17  ----------------------------<  83  4.1   |  22  |  1.29    Ca    9.5      16 May 2018 07:00  Mg     1.5     05-16            RADIOLOGY & ADDITIONAL TESTS:

## 2018-05-16 NOTE — PROGRESS NOTE ADULT - SUBJECTIVE AND OBJECTIVE BOX
No pain, no shortness of breath      VITAL:  T(C): , Max: 37.1 (05-15-18 @ 12:38)  T(F): , Max: 98.8 (05-15-18 @ 12:38)  HR: 74 (05-16-18 @ 05:22)  BP: 129/83 (05-16-18 @ 05:22)  RR: 16 (05-16-18 @ 05:22)  SpO2: 100% (05-16-18 @ 05:22)      PHYSICAL EXAM:  Constitutional: NAD, Alert; frail  HEENT: NCAT, DMM  Neck: Supple, No JVD  Respiratory: CTA-b/l  Cardiovascular: RRR s1s2, no m/r/g  Gastrointestinal: BS+, soft, NT/ND  Extremities: No peripheral edema b/l  Neurological: no focal deficits; strength grossly intact  Psychiatric: Normal mood, normal affect  Back: no CVAT b/l  Skin: No rashes, no nevi , (+)scattered large tophi      LABS:                        12.9   9.09  )-----------( 226      ( 16 May 2018 07:00 )             39.8     Na(138)/K(4.0)/Cl(106)/HCO3(20)/BUN(16)/Cr(1.45)Glu(100)/Ca(8.9)/Mg(1.6)/PO4(--)    05-15 @ 06:40  Na(136)/K(4.4)/Cl(103)/HCO3(20)/BUN(20)/Cr(1.55)Glu(76)/Ca(9.1)/Mg(1.7)/PO4(2.7)    05-14 @ 06:42    IMAGING:  < from: Transthoracic Echocardiogram (05.15.18 @ 13:09) >  1. Mitral annular calcification, otherwise normal mitral  valve. Mild mitral regurgitation.  2. Calcified trileaflet aortic valve with normal opening.  Mild aortic regurgitation.  3. Normal left ventricular internal dimensions and wall  thicknesses.  4. Endocardium not well visualized; grossly normal left  ventricular systolicfunction.  5. The right ventricle is not well visualized; grossly  normal right ventricular systolic function.      IMPRESSION: 62M w/ HTN, HLD, gout, CKD3, and CAD-CABG, s/p recent admission for PCI, and 5/12/18 returned with chest pain    (1)Renal - CKD3 - nonproteinuric. Numbers fluctuating based on hemodynamic status.    (2)Hyperkalemia -now improved, off ACEI, and s/p Kayexalate.     (3)Metabolic acidosis - improved    (4)Chest pain - unclear etiology - repeat TTE relatively unremarkable and does not account for etiology of the chest pain      RECOMMEND:  (1)No ACEI/ARB for now  (2)D/C planning per primary team - could f/u at my office in 2-4weeks            Jono Andino MD  [facility]  [phone] (+)intermittent chest pain - triggered by eating. No SOB.      VITAL:  T(C): , Max: 37.1 (05-15-18 @ 12:38)  T(F): , Max: 98.8 (05-15-18 @ 12:38)  HR: 74 (05-16-18 @ 05:22)  BP: 129/83 (05-16-18 @ 05:22)  RR: 16 (05-16-18 @ 05:22)  SpO2: 100% (05-16-18 @ 05:22)      PHYSICAL EXAM:  Constitutional: NAD, Alert; frail  HEENT: NCAT, DMM  Neck: Supple, No JVD  Respiratory: CTA-b/l  Cardiovascular: RRR s1s2, no m/r/g  Gastrointestinal: BS+, soft, NT/ND  Extremities: No peripheral edema b/l  Neurological: no focal deficits; strength grossly intact  Psychiatric: Normal mood, normal affect  Back: no CVAT b/l  Skin: No rashes, no nevi , (+)scattered large tophi      LABS:                        12.9   9.09  )-----------( 226      ( 16 May 2018 07:00 )             39.8     Na(138)/K(4.0)/Cl(106)/HCO3(20)/BUN(16)/Cr(1.45)Glu(100)/Ca(8.9)/Mg(1.6)/PO4(--)    05-15 @ 06:40  Na(136)/K(4.4)/Cl(103)/HCO3(20)/BUN(20)/Cr(1.55)Glu(76)/Ca(9.1)/Mg(1.7)/PO4(2.7)    05-14 @ 06:42    IMAGING:  < from: Transthoracic Echocardiogram (05.15.18 @ 13:09) >  1. Mitral annular calcification, otherwise normal mitral  valve. Mild mitral regurgitation.  2. Calcified trileaflet aortic valve with normal opening.  Mild aortic regurgitation.  3. Normal left ventricular internal dimensions and wall  thicknesses.  4. Endocardium not well visualized; grossly normal left  ventricular systolicfunction.  5. The right ventricle is not well visualized; grossly  normal right ventricular systolic function.      IMPRESSION: 62M w/ HTN, HLD, gout, CKD3, and CAD-CABG, s/p recent admission for PCI, and 5/12/18 returned with chest pain    (1)Renal - CKD3 - nonproteinuric. Numbers fluctuating based on hemodynamic status.    (2)Hyperkalemia -now improved, off ACEI, and s/p Kayexalate.     (3)Metabolic acidosis - improved    (4)Chest pain - GI etiology? Repeat TTE relatively unremarkable and does not account for etiology of the chest pain      RECOMMEND:  (1)No ACEI/ARB for now  (2)Further workup for source of chest pain per GI  (3)D/C planning per primary team - could f/u at my office in 2-4weeks            Jono Andino MD  [facility]  [phone]

## 2018-05-16 NOTE — DISCHARGE NOTE ADULT - MEDICATION SUMMARY - MEDICATIONS TO TAKE
I will START or STAY ON the medications listed below when I get home from the hospital:    Ecotrin Adult Low Strength 81 mg oral delayed release tablet  -- 1 tab(s) by mouth once a day  -- Indication: For CAD (coronary artery disease)    aluminum hydroxide-magnesium hydroxide 200 mg-200 mg/5 mL oral suspension  -- 30 milliliter(s) by mouth every 6 hours, As needed, Dyspepsia  -- Indication: For Acid reflux, indigestion    isosorbide mononitrate 30 mg oral tablet, extended release  -- 1 tab(s) by mouth once a day  -- Indication: For CAD (coronary artery disease)    atorvastatin 80 mg oral tablet  -- 1 tab(s) by mouth once a day (at bedtime)  -- Indication: For CAD (coronary artery disease), High cholesterol    Uloric 80 mg oral tablet  -- 1 tab(s) by mouth once a day  -- Indication: For Prophylaxis    Plavix 75 mg oral tablet  -- 1 tab(s) by mouth once a day  -- Indication: For CAD (coronary artery disease)    Metoprolol Succinate ER 25 mg oral tablet, extended release  -- 1 tab(s) by mouth once a day  -- Indication: For CAD (coronary artery disease), High blood pressure    amLODIPine 5 mg oral tablet  -- 1 tab(s) by mouth once a day  -- Indication: For High blood pressure    senna oral tablet  -- 2 tab(s) by mouth once a day (at bedtime), As Needed -  for constipation  -- Indication: For Constipation    docusate sodium 100 mg oral capsule  -- 1 cap(s) by mouth 2 times a day, As Needed - for constipation  -- Indication: For Constipation    sucralfate 1 g oral tablet  -- 1 tab(s) by mouth 4 times a day  -- Indication: For Acid reflux, indigestion    pantoprazole 40 mg oral delayed release tablet  -- 1 tab(s) by mouth 2 times a day   -- Indication: For Acid reflux, indigestion

## 2018-05-16 NOTE — DISCHARGE NOTE ADULT - HOSPITAL COURSE
61y/o M w/ PMHx CAD s/p CABG and stents (most recently 2 days ago to mLAD), HTN, HLD, gout presents to the ED for chest pain. Pt states he has left sided chest pain with radiation to the left arm, leg and neck and back. Pt states the chest pain is pleuritic, nonpositional, and reproducible. Pt states he took his ASA and plavix today.    On admission: H/H 12.4/38.8. Na 134. K 5.5 (mildly hemolyzed). BUN/Cr 27/1.95. ACS ruled out by negative cardiac enzymes. EKG shows NSR @ 73 bpm  unchanged from prior. VQ scan: Abnormal ventilation/perfusion lung scan. Very low probability of pulmonary embolus.    Echocardiogram displayed mitral annular calcification, otherwise normal mitral valve. Mild mitral regurgitation. Calcified trileaflet aortic valve with normal opening. Mild aortic regurgitation. Normal left ventricular internal dimensions and wall thicknesses. Endocardium not well visualized; grossly normal left ventricular systolic function. The right ventricle is not well visualized; grossly normal right ventricular systolic function 63y/o M w/ PMHx CAD s/p CABG and stents (most recently 2 days ago to mLAD), HTN, HLD, gout presents to the ED for chest pain. Pt states he has left sided chest pain with radiation to the left arm, leg and neck and back. Pt states the chest pain is pleuritic, nonpositional, and reproducible. Pt states he took his ASA and plavix today.    On admission: H/H 12.4/38.8. Na 134. K 5.5 (mildly hemolyzed). BUN/Cr 27/1.95. ACS ruled out by negative cardiac enzymes. EKG shows NSR @ 73 bpm  unchanged from prior. VQ scan: Abnormal ventilation/perfusion lung scan. Very low probability of pulmonary embolus.    Echocardiogram displayed mitral annular calcification, otherwise normal mitral valve. Mild mitral regurgitation. Calcified trileaflet aortic valve with normal opening. Mild aortic regurgitation. Normal left ventricular internal dimensions and wall thicknesses. Endocardium not well visualized; grossly normal left ventricular systolic function. The right ventricle is not well visualized; grossly normal right ventricular systolic function.    GI consult was called: Epigastric abdominal pain.  Plan: - cbc stable. Transfuse prn. Continue Protonix 40mg IVP BID change to PO on discharge. Continue Carafate 1g PO QID. Maalox prn. Manage medically and defer endoscopic evaluation at this time given recent stent placement to pLAD on 5/9; will plan for outpatient follow up for eventual EGD. No GI objection to dc planning per primary team.     Cardiology follow up: ACS ruled out with serial CE, c/w ASA/Plavix/statin/Imdur/Metoprolol for CAD/PCI, VQ scan very low prob for PE. GI consult appreciated --recommend IV PPI, Carafate and Maalox, unremarkable stool, stable CBC, - outpatient EGD. Repeat TTE noted - normal Bi_v function, no pericardial effusion, no valve disease. Left rib series pending - if negative, plan for dc home today. HD stable no evidence CHF.    Renal: CKD3 - nonproteinuric. Numbers fluctuating based on hemodynamic status. Hyperkalemia -now improved, off ACEI, and s/p Kayexalate. Metabolic acidosis - improved. Chest pain - GI etiology? Repeat TTE relatively unremarkable and does not account for etiology of the chest pain. No ACEI/ARB for now. Further workup for source of chest pain per GI. D/C planning per primary team - could f/u at my office in 2-4weeks.    Left rib series displayed ************INCOMPLETE 61y/o M w/ PMHx CAD s/p CABG and stents (most recently 2 days ago to mLAD), HTN, HLD, gout presents to the ED for chest pain. Pt states he has left sided chest pain with radiation to the left arm, leg and neck and back. Pt states the chest pain is pleuritic, nonpositional, and reproducible. Pt states he took his ASA and plavix today.    On admission: H/H 12.4/38.8. Na 134. K 5.5 (mildly hemolyzed). BUN/Cr 27/1.95. ACS ruled out by negative cardiac enzymes. EKG shows NSR @ 73 bpm  unchanged from prior. VQ scan: Abnormal ventilation/perfusion lung scan. Very low probability of pulmonary embolus.    Echocardiogram displayed mitral annular calcification, otherwise normal mitral valve. Mild mitral regurgitation. Calcified trileaflet aortic valve with normal opening. Mild aortic regurgitation. Normal left ventricular internal dimensions and wall thicknesses. Endocardium not well visualized; grossly normal left ventricular systolic function. The right ventricle is not well visualized; grossly normal right ventricular systolic function.    GI consult was called: Epigastric abdominal pain.  Plan: - cbc stable. Transfuse prn. Continue Protonix 40mg IVP BID change to PO on discharge. Continue Carafate 1g PO QID. Maalox prn. Manage medically and defer endoscopic evaluation at this time given recent stent placement to pLAD on 5/9; will plan for outpatient follow up for eventual EGD. No GI objection to dc planning per primary team.     Cardiology follow up: ACS ruled out with serial CE, c/w ASA/Plavix/statin/Imdur/Metoprolol for CAD/PCI, VQ scan very low prob for PE. GI consult appreciated --recommend IV PPI, Carafate and Maalox, unremarkable stool, stable CBC, - outpatient EGD. Repeat TTE noted - normal Bi_v function, no pericardial effusion, no valve disease. Left rib series pending - if negative, plan for dc home today. HD stable no evidence CHF.    Renal: CKD3 - nonproteinuric. Numbers fluctuating based on hemodynamic status. Hyperkalemia -now improved, off ACEI, and s/p Kayexalate. Metabolic acidosis - improved. Chest pain - GI etiology? Repeat TTE relatively unremarkable and does not account for etiology of the chest pain. No ACEI/ARB for now. Further workup for source of chest pain per GI. D/C planning per primary team - could f/u at my office in 2-4weeks.    Left rib series displayed 12 rib pairs present. No acute appearing displaced rib fractures or other gross rib pathology. Faint amorphous intra-articular calcific deposits in association with the left AC joint compatible with chronic calcinosis, most commonly seen in association with CPPD. Otherwise intact remaining imaged osseous structures. Clear visualized lungs. No pleural effusion and no pneumothorax. Sternal wires and mediastinal surgical clips noted. Calcified and slightly tortuous aorta.    Case discussed with Dr. Grace, labs/vitals/imaging reviewed, Pt medically cleared for discharge to home with follow up noted above.

## 2018-05-16 NOTE — DISCHARGE NOTE ADULT - PATIENT PORTAL LINK FT
You can access the [email or web address] Patient Portal, offered by [facility], by registering with the following website: [email or web address]

## 2018-05-16 NOTE — PROGRESS NOTE ADULT - PROBLEM SELECTOR PLAN 2
- s/p recent stent to pLAD on 5/9/2018  - care per cardiology appreciated
- s/p recent stent to pLAD on 5/9/2018  - care per cardiology appreciated

## 2018-05-16 NOTE — PROGRESS NOTE ADULT - SUBJECTIVE AND OBJECTIVE BOX
No anginal chest pain. no dyspnea  c/o chest burning after eating   ROS otherwise negative       MEDICATIONS  (STANDING):  amLODIPine   Tablet 5 milliGRAM(s) Oral daily  aspirin enteric coated 81 milliGRAM(s) Oral daily  atorvastatin 80 milliGRAM(s) Oral at bedtime  clopidogrel Tablet 75 milliGRAM(s) Oral daily  docusate sodium 100 milliGRAM(s) Oral two times a day  febuxostat 80 milliGRAM(s) Oral daily  heparin  Injectable 5000 Unit(s) SubCutaneous every 12 hours  isosorbide   mononitrate ER Tablet (IMDUR) 30 milliGRAM(s) Oral daily  magnesium oxide 400 milliGRAM(s) Oral two times a day with meals  metoprolol succinate ER 25 milliGRAM(s) Oral daily  pantoprazole  Injectable 40 milliGRAM(s) IV Push two times a day  senna 2 Tablet(s) Oral at bedtime  sucralfate 1 Gram(s) Oral four times a day    MEDICATIONS  (PRN):  aluminum hydroxide/magnesium hydroxide/simethicone Suspension 30 milliLiter(s) Oral every 6 hours PRN Dyspepsia  melatonin 3 milliGRAM(s) Oral at bedtime PRN Sleep      LABS:                        12.9   9.09  )-----------( 226      ( 16 May 2018 07:00 )             39.8     Hemoglobin: 12.9 g/dL (05-16 @ 07:00)  Hemoglobin: 12.9 g/dL (05-16 @ 07:00)  Hemoglobin: 12.7 g/dL (05-15 @ 06:40)  Hemoglobin: 12.7 g/dL (05-15 @ 06:40)  Hemoglobin: 12.8 g/dL (05-14 @ 06:42)    05-16    138  |  103  |  17  ----------------------------<  83  4.1   |  22  |  1.29    Ca    9.5      16 May 2018 07:00  Mg     1.5     05-16      Creatinine Trend: 1.29<--, 1.45<--, 1.55<--, 1.79<--, 1.87<--, 1.95<--           PHYSICAL EXAM  Vital Signs Last 24 Hrs  T(C): 36.4 (16 May 2018 05:22), Max: 37.1 (15 May 2018 12:38)  T(F): 97.6 (16 May 2018 05:22), Max: 98.8 (15 May 2018 12:38)  HR: 74 (16 May 2018 05:22) (63 - 74)  BP: 129/83 (16 May 2018 05:22) (113/73 - 133/81)  BP(mean): --  RR: 16 (16 May 2018 05:22) (16 - 16)  SpO2: 100% (16 May 2018 05:22) (100% - 100%)    Cardio: S1S2 RRR   Respiratory: Lungs clear to auscultation, normal effort  Gastrointestinal: Abdomen soft, ND, NT, +BS  Skin: Warm, dry, intact. No rash.  Musculoskeletal: Normal ROM, normal strength  Ext: No C/C/E B/L LE    DIAGNOSTIC DATA  TELEMETRY: NSR    < from: Cardiac Cath Lab - Adult (05.09.18 @ 15:23) >  VENTRICLES: No left ventriculogram was performed.  CORONARY VESSELS:  LM:   --  Distal left main: There was a discrete 30 % stenosis at a site  with no prior intervention. The lesion was concentric. There was HARINI  grade 3 flow through the vessel (brisk flow).  LAD:   --  Proximal LAD: There was a tubular 70 % stenosis at a site with  no prior intervention. The lesion was eccentric. There was HARINI grade 3  flow through the vessel (brisk flow) and a large vascular territory distal  to the lesion. This is a likely culprit for the patient's clinical  presentation. An intervention was performed.iFR 0.88  --  Mid LAD: Mild Myocardial bridging was present. There was a discrete 50  % stenosis at a site with no prior intervention. The lesion was eccentric.  There was HARINI grade 3 flow through the vessel (brisk flow).  COMPLICATIONS: There were no complications.  DIAGNOSTIC IMPRESSIONS: Successful PCI to severe stenosis of proximal LAD  using 3.5mm Giancarlo LEE ANN  Post PCI iFR was 0.92 (this is related to moderate left main and moderate  mid LAD stenosis)  DIAGNOSTIC RECOMMENDATIONS: DAPT x 12 months  Aggressive risk factor modification  INTERVENTIONAL IMPRESSIONS: Successful PCI to severe stenosis of proximal  LAD using 3.5mm [place] LEE ANN  Post PCI iFR was 0.92 (this is related to moderate left main and moderate  mid LAD stenosis)  INTERVENTIONAL RECOMMENDATIONS: DAPT x 12 months  Aggressive risk factor modification  Prepared and signed by  Willow Guthrie M.D.  Signed 05/10/2018 11:07:40    < end of copied text >    < from: NM Pulmonary Ventilation/Perfusion Scan (05.13.18 @ 10:16) >  IMPRESSION: Abnormal ventilation/perfusion lung scan. Very low   probability of pulmonary embolus.    < end of copied text >      < from: Transthoracic Echocardiogram (05.15.18 @ 13:09) >  CONCLUSIONS:  1. Mitral annular calcification, otherwise normal mitral  valve. Mild mitral regurgitation.  2. Calcified trileaflet aortic valve with normal opening.  Mild aortic regurgitation.  3. Normal left ventricular internal dimensions and wall  thicknesses.  4. Endocardium not well visualized; grossly normal left  ventricular systolicfunction.  5. The right ventricle is not well visualized; grossly  normal right ventricular systolic function.  ------------------------------------------------------------------------  Confirmed on  5/15/2018 - 14:40:25 by Chema Jauregui M.D.  ------------------------------------------------------------------------    < end of copied text >    ASSESSMENT AND PLAN:  62 year old Male w/ hx of CAD s/p CABG in 2015, s/p  PCI 2016, admitted to us last week with Chest pain, s/p recent normal NST as OP per patient, s/p cath and staged PCI to pLAD on 5/9, HTN, HLD, Gout, congential hydrocephalus, who is readmitted with atypical chest pain    --ACS ruled out with serial CE  -- c/w ASA/Plavix/statin/Imdur/Metoprolol for CAD/PCI  --VQ scan very low prob for PE  --GI consult appreciated --recommend IV PPI, Carafate and Maalox, unremarkable stool, stable CBC, - outpatient EGD  --repeat TTE noted - normal Bi_v function , no pericardial effusion , no valve disease  -- HD stable no evidence CHF    Flora ZEPEDA No anginal chest pain. no dyspnea  c/o chest burning after eating and pinpoint left chest discomfort that is reproducible with palpation   ROS otherwise negative       MEDICATIONS  (STANDING):  amLODIPine   Tablet 5 milliGRAM(s) Oral daily  aspirin enteric coated 81 milliGRAM(s) Oral daily  atorvastatin 80 milliGRAM(s) Oral at bedtime  clopidogrel Tablet 75 milliGRAM(s) Oral daily  docusate sodium 100 milliGRAM(s) Oral two times a day  febuxostat 80 milliGRAM(s) Oral daily  heparin  Injectable 5000 Unit(s) SubCutaneous every 12 hours  isosorbide   mononitrate ER Tablet (IMDUR) 30 milliGRAM(s) Oral daily  magnesium oxide 400 milliGRAM(s) Oral two times a day with meals  metoprolol succinate ER 25 milliGRAM(s) Oral daily  pantoprazole  Injectable 40 milliGRAM(s) IV Push two times a day  senna 2 Tablet(s) Oral at bedtime  sucralfate 1 Gram(s) Oral four times a day    MEDICATIONS  (PRN):  aluminum hydroxide/magnesium hydroxide/simethicone Suspension 30 milliLiter(s) Oral every 6 hours PRN Dyspepsia  melatonin 3 milliGRAM(s) Oral at bedtime PRN Sleep      LABS:                        12.9   9.09  )-----------( 226      ( 16 May 2018 07:00 )             39.8     Hemoglobin: 12.9 g/dL (05-16 @ 07:00)  Hemoglobin: 12.9 g/dL (05-16 @ 07:00)  Hemoglobin: 12.7 g/dL (05-15 @ 06:40)  Hemoglobin: 12.7 g/dL (05-15 @ 06:40)  Hemoglobin: 12.8 g/dL (05-14 @ 06:42)    05-16    138  |  103  |  17  ----------------------------<  83  4.1   |  22  |  1.29    Ca    9.5      16 May 2018 07:00  Mg     1.5     05-16      Creatinine Trend: 1.29<--, 1.45<--, 1.55<--, 1.79<--, 1.87<--, 1.95<--           PHYSICAL EXAM  Vital Signs Last 24 Hrs  T(C): 36.4 (16 May 2018 05:22), Max: 37.1 (15 May 2018 12:38)  T(F): 97.6 (16 May 2018 05:22), Max: 98.8 (15 May 2018 12:38)  HR: 74 (16 May 2018 05:22) (63 - 74)  BP: 129/83 (16 May 2018 05:22) (113/73 - 133/81)  BP(mean): --  RR: 16 (16 May 2018 05:22) (16 - 16)  SpO2: 100% (16 May 2018 05:22) (100% - 100%)    Cardio: S1S2 RRR   Respiratory: Lungs clear to auscultation, normal effort  Gastrointestinal: Abdomen soft, ND, NT, +BS  Skin: Warm, dry, intact. No rash.  Musculoskeletal: Normal ROM, normal strength  Ext: No C/C/E B/L LE    DIAGNOSTIC DATA  TELEMETRY: NSR    < from: Cardiac Cath Lab - Adult (05.09.18 @ 15:23) >  VENTRICLES: No left ventriculogram was performed.  CORONARY VESSELS:  LM:   --  Distal left main: There was a discrete 30 % stenosis at a site  with no prior intervention. The lesion was concentric. There was HARINI  grade 3 flow through the vessel (brisk flow).  LAD:   --  Proximal LAD: There was a tubular 70 % stenosis at a site with  no prior intervention. The lesion was eccentric. There was HARINI grade 3  flow through the vessel (brisk flow) and a large vascular territory distal  to the lesion. This is a likely culprit for the patient's clinical  presentation. An intervention was performed.iFR 0.88  --  Mid LAD: Mild Myocardial bridging was present. There was a discrete 50  % stenosis at a site with no prior intervention. The lesion was eccentric.  There was HARINI grade 3 flow through the vessel (brisk flow).  COMPLICATIONS: There were no complications.  DIAGNOSTIC IMPRESSIONS: Successful PCI to severe stenosis of proximal LAD  using 3.5mm Giancarlo LEE ANN  Post PCI iFR was 0.92 (this is related to moderate left main and moderate  mid LAD stenosis)  DIAGNOSTIC RECOMMENDATIONS: DAPT x 12 months  Aggressive risk factor modification  INTERVENTIONAL IMPRESSIONS: Successful PCI to severe stenosis of proximal  LAD using 3.5mm [place] LEE ANN  Post PCI iFR was 0.92 (this is related to moderate left main and moderate  mid LAD stenosis)  INTERVENTIONAL RECOMMENDATIONS: DAPT x 12 months  Aggressive risk factor modification  Prepared and signed by  Willow Guthrie M.D.  Signed 05/10/2018 11:07:40    < end of copied text >    < from: NM Pulmonary Ventilation/Perfusion Scan (05.13.18 @ 10:16) >  IMPRESSION: Abnormal ventilation/perfusion lung scan. Very low   probability of pulmonary embolus.    < end of copied text >      < from: Transthoracic Echocardiogram (05.15.18 @ 13:09) >  CONCLUSIONS:  1. Mitral annular calcification, otherwise normal mitral  valve. Mild mitral regurgitation.  2. Calcified trileaflet aortic valve with normal opening.  Mild aortic regurgitation.  3. Normal left ventricular internal dimensions and wall  thicknesses.  4. Endocardium not well visualized; grossly normal left  ventricular systolicfunction.  5. The right ventricle is not well visualized; grossly  normal right ventricular systolic function.  ------------------------------------------------------------------------  Confirmed on  5/15/2018 - 14:40:25 by Chema Jauregui M.D.  ------------------------------------------------------------------------    < end of copied text >    ASSESSMENT AND PLAN:  62 year old Male w/ hx of CAD s/p CABG in 2015, s/p  PCI 2016, admitted to us last week with Chest pain, s/p recent normal NST as OP per patient, s/p cath and staged PCI to pLAD on 5/9, HTN, HLD, Gout, congential hydrocephalus, who is readmitted with atypical chest pain    --ACS ruled out with serial CE  -- c/w ASA/Plavix/statin/Imdur/Metoprolol for CAD/PCI  --VQ scan very low prob for PE  --GI consult appreciated --recommend IV PPI, Carafate and Maalox, unremarkable stool, stable CBC, - outpatient EGD  --repeat TTE noted - normal Bi_v function , no pericardial effusion , no valve disease  -- left rib series pending - if negative, plan for dc home today   -- HD stable no evidence CHF      Flora Webster RPA-C

## 2018-05-16 NOTE — PROGRESS NOTE ADULT - SUBJECTIVE AND OBJECTIVE BOX
Patient is a 62y old  Male who presents with a chief complaint of chest pain (16 May 2018 11:14)      INTERVAL HPI/OVERNIGHT EVENTS:  T(C): 37.1 (05-16-18 @ 13:11), Max: 37.1 (05-16-18 @ 13:11)  HR: 79 (05-16-18 @ 13:11) (71 - 79)  BP: 129/84 (05-16-18 @ 13:11) (125/86 - 133/81)  RR: 17 (05-16-18 @ 13:11) (16 - 17)  SpO2: 100% (05-16-18 @ 13:11) (100% - 100%)  Wt(kg): --  I&O's Summary      LABS:                        12.9   9.09  )-----------( 226      ( 16 May 2018 07:00 )             39.8     05-16    138  |  103  |  17  ----------------------------<  83  4.1   |  22  |  1.29    Ca    9.5      16 May 2018 07:00  Mg     1.5     05-16          CAPILLARY BLOOD GLUCOSE      POCT Blood Glucose.: 82 mg/dL (16 May 2018 08:44)            MEDICATIONS  (STANDING):  amLODIPine   Tablet 5 milliGRAM(s) Oral daily  aspirin enteric coated 81 milliGRAM(s) Oral daily  atorvastatin 80 milliGRAM(s) Oral at bedtime  clopidogrel Tablet 75 milliGRAM(s) Oral daily  docusate sodium 100 milliGRAM(s) Oral two times a day  febuxostat 80 milliGRAM(s) Oral daily  heparin  Injectable 5000 Unit(s) SubCutaneous every 12 hours  isosorbide   mononitrate ER Tablet (IMDUR) 30 milliGRAM(s) Oral daily  magnesium oxide 400 milliGRAM(s) Oral two times a day with meals  metoprolol succinate ER 25 milliGRAM(s) Oral daily  pantoprazole  Injectable 40 milliGRAM(s) IV Push two times a day  senna 2 Tablet(s) Oral at bedtime  sucralfate 1 Gram(s) Oral four times a day    MEDICATIONS  (PRN):  aluminum hydroxide/magnesium hydroxide/simethicone Suspension 30 milliLiter(s) Oral every 6 hours PRN Dyspepsia  melatonin 3 milliGRAM(s) Oral at bedtime PRN Sleep          PHYSICAL EXAM:  GENERAL: NAD, well-groomed, well-developed  HEAD:  Atraumatic, Normocephalic  CHEST/LUNG: Clear to percussion bilaterally; No rales, rhonchi, wheezing, or rubs  HEART: Regular rate and rhythm; No murmurs, rubs, or gallops  ABDOMEN: Soft, Nontender, Nondistended; Bowel sounds present  EXTREMITIES:  2+ Peripheral Pulses, No clubbing, cyanosis, or edema  LYMPH: No lymphadenopathy noted  SKIN: No rashes or lesions    Care Discussed with Consultants/Other Providers [+ ] YES  [ ] NO

## 2018-05-16 NOTE — DISCHARGE NOTE ADULT - ADDITIONAL INSTRUCTIONS
Cardiology follow up in 1-2 weeks  Gastroenterologist follow up for endoscopy as outpt   Renal follow up to monitor kidney function in 1-2 weeks.

## 2018-05-16 NOTE — DISCHARGE NOTE ADULT - PLAN OF CARE
Follow up with your primary care physician for further monitoring in 1-2 weeks. Please call to arrange appointment. Follow up with your gastroenterologist Dr. Wadsworth for further monitoring in 1-2 weeks, please call to arrange appointment for endoscopy as outpatient. Continue Lipitor at bedtime. Goal LDL <100. Low cholesterol diet. Follow up with your primary care physician for further monitoring in 1-2 weeks. Please call to arrange appointment. Ensure compliance with your medications as directed to maintain goal /80mmhg. Low salt diet. Ensure compliance with your medications (Aspirin, Plavix, Metoprolol, and Lipitor) as directed to prevent progression of coronary artery disease. Follow up with your Cardiologist for further monitoring in 1-2 weeks. Please call to arrange appointment. Prevent worsening kidney disease. Follow up with your nephrologist Dr. Andino for further monitoring in 1-2 weeks. Please call to arrange appointment. Avoid medications that may be harmful to your kidneys such as NSAID pain relievers (Advil, Aleve, Motrin, etc.) Use Tylenol for pain if needed. Avoid contrast if you require any medical testing. Ensure compliance with your medications as directed and follow up as noted below. Follow up with your Cardiologist for further monitoring in 1-2 weeks. Please call to arrange appointment. Follow up with your gastroenterologist Dr. Wadsworth for further monitoring in 1-2 weeks, please call to arrange appointment for endoscopy as outpatient. Ensure compliance with your medications as directed to maintain goal /80mmhg. Low salt diet. Lisinopril was discontinued. Prevent worsening kidney disease. Lisinopril was stopped.

## 2018-06-01 PROCEDURE — G9005: CPT

## 2018-06-02 NOTE — DIETITIAN INITIAL EVALUATION ADULT. - PROBLEM SELECTOR PLAN 1
Defibrillator discharge tele monitor   cardiac enzymes x 2  Serial EKGs  DASH diet  continue ASA, Plavix, Statin and Metoprolol  consider cardiac cath

## 2018-07-01 ENCOUNTER — OUTPATIENT (OUTPATIENT)
Dept: OUTPATIENT SERVICES | Facility: HOSPITAL | Age: 63
LOS: 1 days | End: 2018-07-01
Payer: MEDICAID

## 2018-07-01 DIAGNOSIS — Z95.1 PRESENCE OF AORTOCORONARY BYPASS GRAFT: Chronic | ICD-10-CM

## 2018-07-01 DIAGNOSIS — Z95.5 PRESENCE OF CORONARY ANGIOPLASTY IMPLANT AND GRAFT: Chronic | ICD-10-CM

## 2018-07-19 DIAGNOSIS — Z71.89 OTHER SPECIFIED COUNSELING: ICD-10-CM

## 2018-08-20 ENCOUNTER — EMERGENCY (EMERGENCY)
Facility: HOSPITAL | Age: 63
LOS: 1 days | Discharge: HOME | End: 2018-08-20
Attending: EMERGENCY MEDICINE | Admitting: EMERGENCY MEDICINE
Payer: MEDICAID

## 2018-08-20 VITALS
HEART RATE: 86 BPM | TEMPERATURE: 98 F | DIASTOLIC BLOOD PRESSURE: 73 MMHG | RESPIRATION RATE: 16 BRPM | SYSTOLIC BLOOD PRESSURE: 140 MMHG | OXYGEN SATURATION: 100 %

## 2018-08-20 DIAGNOSIS — Z95.1 PRESENCE OF AORTOCORONARY BYPASS GRAFT: Chronic | ICD-10-CM

## 2018-08-20 DIAGNOSIS — Z95.5 PRESENCE OF CORONARY ANGIOPLASTY IMPLANT AND GRAFT: Chronic | ICD-10-CM

## 2018-08-20 LAB
ALBUMIN SERPL ELPH-MCNC: 4.6 G/DL — SIGNIFICANT CHANGE UP (ref 3.3–5)
ALP SERPL-CCNC: 77 U/L — SIGNIFICANT CHANGE UP (ref 40–120)
ALT FLD-CCNC: 16 U/L — SIGNIFICANT CHANGE UP (ref 4–41)
APTT BLD: 27.8 SEC — SIGNIFICANT CHANGE UP (ref 27.5–37.4)
AST SERPL-CCNC: 25 U/L — SIGNIFICANT CHANGE UP (ref 4–40)
BASE EXCESS BLDV CALC-SCNC: -0.2 MMOL/L — SIGNIFICANT CHANGE UP
BASOPHILS # BLD AUTO: 0.03 K/UL — SIGNIFICANT CHANGE UP (ref 0–0.2)
BASOPHILS NFR BLD AUTO: 0.3 % — SIGNIFICANT CHANGE UP (ref 0–2)
BILIRUB SERPL-MCNC: 0.3 MG/DL — SIGNIFICANT CHANGE UP (ref 0.2–1.2)
BLOOD GAS VENOUS - CREATININE: 1.68 MG/DL — HIGH (ref 0.5–1.3)
BUN SERPL-MCNC: 31 MG/DL — HIGH (ref 7–23)
CALCIUM SERPL-MCNC: 10.4 MG/DL — SIGNIFICANT CHANGE UP (ref 8.4–10.5)
CHLORIDE BLDV-SCNC: 111 MMOL/L — HIGH (ref 96–108)
CHLORIDE SERPL-SCNC: 105 MMOL/L — SIGNIFICANT CHANGE UP (ref 98–107)
CO2 SERPL-SCNC: 22 MMOL/L — SIGNIFICANT CHANGE UP (ref 22–31)
CREAT SERPL-MCNC: 1.71 MG/DL — HIGH (ref 0.5–1.3)
EOSINOPHIL # BLD AUTO: 0.15 K/UL — SIGNIFICANT CHANGE UP (ref 0–0.5)
EOSINOPHIL NFR BLD AUTO: 1.5 % — SIGNIFICANT CHANGE UP (ref 0–6)
GAS PNL BLDV: 140 MMOL/L — SIGNIFICANT CHANGE UP (ref 136–146)
GLUCOSE BLDV-MCNC: 96 — SIGNIFICANT CHANGE UP (ref 70–99)
GLUCOSE SERPL-MCNC: 96 MG/DL — SIGNIFICANT CHANGE UP (ref 70–99)
HCO3 BLDV-SCNC: 22 MMOL/L — SIGNIFICANT CHANGE UP (ref 20–27)
HCT VFR BLD CALC: 41.4 % — SIGNIFICANT CHANGE UP (ref 39–50)
HCT VFR BLDV CALC: 40.4 % — SIGNIFICANT CHANGE UP (ref 39–51)
HGB BLD-MCNC: 12.5 G/DL — LOW (ref 13–17)
HGB BLDV-MCNC: 13.2 G/DL — SIGNIFICANT CHANGE UP (ref 13–17)
IMM GRANULOCYTES # BLD AUTO: 0.05 # — SIGNIFICANT CHANGE UP
IMM GRANULOCYTES NFR BLD AUTO: 0.5 % — SIGNIFICANT CHANGE UP (ref 0–1.5)
INR BLD: 0.88 — SIGNIFICANT CHANGE UP (ref 0.88–1.17)
LACTATE BLDV-MCNC: 2.8 MMOL/L — HIGH (ref 0.5–2)
LYMPHOCYTES # BLD AUTO: 1.69 K/UL — SIGNIFICANT CHANGE UP (ref 1–3.3)
LYMPHOCYTES # BLD AUTO: 16.5 % — SIGNIFICANT CHANGE UP (ref 13–44)
MCHC RBC-ENTMCNC: 24.7 PG — LOW (ref 27–34)
MCHC RBC-ENTMCNC: 30.2 % — LOW (ref 32–36)
MCV RBC AUTO: 81.7 FL — SIGNIFICANT CHANGE UP (ref 80–100)
MONOCYTES # BLD AUTO: 0.91 K/UL — HIGH (ref 0–0.9)
MONOCYTES NFR BLD AUTO: 8.9 % — SIGNIFICANT CHANGE UP (ref 2–14)
NEUTROPHILS # BLD AUTO: 7.4 K/UL — SIGNIFICANT CHANGE UP (ref 1.8–7.4)
NEUTROPHILS NFR BLD AUTO: 72.3 % — SIGNIFICANT CHANGE UP (ref 43–77)
NRBC # FLD: 0 — SIGNIFICANT CHANGE UP
PCO2 BLDV: 54 MMHG — HIGH (ref 41–51)
PH BLDV: 7.29 PH — LOW (ref 7.32–7.43)
PLATELET # BLD AUTO: 332 K/UL — SIGNIFICANT CHANGE UP (ref 150–400)
PMV BLD: 9.4 FL — SIGNIFICANT CHANGE UP (ref 7–13)
PO2 BLDV: 18 MMHG — LOW (ref 35–40)
POTASSIUM BLDV-SCNC: 4.5 MMOL/L — SIGNIFICANT CHANGE UP (ref 3.4–4.5)
POTASSIUM SERPL-MCNC: 5.1 MMOL/L — SIGNIFICANT CHANGE UP (ref 3.5–5.3)
POTASSIUM SERPL-SCNC: 5.1 MMOL/L — SIGNIFICANT CHANGE UP (ref 3.5–5.3)
PROT SERPL-MCNC: 8.4 G/DL — HIGH (ref 6–8.3)
PROTHROM AB SERPL-ACNC: 10.1 SEC — SIGNIFICANT CHANGE UP (ref 9.8–13.1)
RBC # BLD: 5.07 M/UL — SIGNIFICANT CHANGE UP (ref 4.2–5.8)
RBC # FLD: 14.7 % — HIGH (ref 10.3–14.5)
SAO2 % BLDV: 15.1 % — LOW (ref 60–85)
SODIUM SERPL-SCNC: 143 MMOL/L — SIGNIFICANT CHANGE UP (ref 135–145)
WBC # BLD: 10.23 K/UL — SIGNIFICANT CHANGE UP (ref 3.8–10.5)
WBC # FLD AUTO: 10.23 K/UL — SIGNIFICANT CHANGE UP (ref 3.8–10.5)

## 2018-08-20 PROCEDURE — 70450 CT HEAD/BRAIN W/O DYE: CPT | Mod: 26

## 2018-08-20 PROCEDURE — 99284 EMERGENCY DEPT VISIT MOD MDM: CPT

## 2018-08-20 RX ORDER — SODIUM CHLORIDE 9 MG/ML
1000 INJECTION INTRAMUSCULAR; INTRAVENOUS; SUBCUTANEOUS ONCE
Qty: 0 | Refills: 0 | Status: COMPLETED | OUTPATIENT
Start: 2018-08-20 | End: 2018-08-20

## 2018-08-20 RX ORDER — MECLIZINE HCL 12.5 MG
1 TABLET ORAL
Qty: 63 | Refills: 0
Start: 2018-08-20 | End: 2018-09-09

## 2018-08-20 RX ORDER — MECLIZINE HCL 12.5 MG
50 TABLET ORAL ONCE
Qty: 0 | Refills: 0 | Status: COMPLETED | OUTPATIENT
Start: 2018-08-20 | End: 2018-08-20

## 2018-08-20 RX ORDER — ACETAMINOPHEN 500 MG
975 TABLET ORAL ONCE
Qty: 0 | Refills: 0 | Status: COMPLETED | OUTPATIENT
Start: 2018-08-20 | End: 2018-08-20

## 2018-08-20 RX ORDER — METOCLOPRAMIDE HCL 10 MG
10 TABLET ORAL ONCE
Qty: 0 | Refills: 0 | Status: COMPLETED | OUTPATIENT
Start: 2018-08-20 | End: 2018-08-20

## 2018-08-20 RX ORDER — KETOROLAC TROMETHAMINE 30 MG/ML
15 SYRINGE (ML) INJECTION ONCE
Qty: 0 | Refills: 0 | Status: DISCONTINUED | OUTPATIENT
Start: 2018-08-20 | End: 2018-08-20

## 2018-08-20 RX ADMIN — Medication 975 MILLIGRAM(S): at 20:52

## 2018-08-20 RX ADMIN — SODIUM CHLORIDE 1000 MILLILITER(S): 9 INJECTION INTRAMUSCULAR; INTRAVENOUS; SUBCUTANEOUS at 20:52

## 2018-08-20 RX ADMIN — Medication 10 MILLIGRAM(S): at 20:52

## 2018-08-20 RX ADMIN — Medication 50 MILLIGRAM(S): at 20:52

## 2018-08-20 RX ADMIN — Medication 15 MILLIGRAM(S): at 20:52

## 2018-08-20 NOTE — CONSULT NOTE ADULT - SUBJECTIVE AND OBJECTIVE BOX
HPI:  62 yo rh  male, with PMHx of HTN, HLP, CAD s/p stent p/w right sided headache, which is being treated in ED, during the work up to rule structural causes, CTH was performed which showed moderately enlarged ventricles, through out the brain ventricular system and symmetrically. No Hx of head trauma or SAH, had a CT H 3 m ago which showed similar findings with no significant changes compare to this time. Denies bladder incontinence, gait abnormality or memory impairment.     MEDICATIONS  (STANDING):    MEDICATIONS  (PRN):    PAST MEDICAL & SURGICAL HISTORY:  Chronic gout of elbow, unspecified cause, unspecified laterality  HLD (hyperlipidemia)  CAD (coronary artery disease)  HTN (hypertension)  S/P CABG x 3: 2015  H/O heart artery stent: 2016    FAMILY HISTORY:  No pertinent family history in first degree relatives    Allergies    No Known Allergies    Intolerances        SHx - No smoking, No ETOH, No drug abuse    Review of Systems:  CONSTITUTIONAL:  No weight loss, fever, chills, weakness or fatigue.  HEENT:  Eyes:  No visual loss, blurred vision, double vision or yellow sclerae. Ears, Nose, Throat:  No hearing loss, sneezing, congestion, runny nose or sore throat.  CARDIOVASCULAR:  No chest pain, chest pressure or chest discomfort. No palpitations or edema.  GASTROINTESTINAL:  No anorexia, nausea, vomiting or diarrhea. No abdominal pain or blood.  NEUROLOGICAL: See HPI  MUSCULOSKELETAL:  No muscle, back pain, joint pain or stiffness.  PSYCHIATRIC:  No history of depression or anxiety.      Vital Signs Last 24 Hrs  T(C): 36.9 (20 Aug 2018 15:55), Max: 36.9 (20 Aug 2018 15:55)  T(F): 98.4 (20 Aug 2018 15:55), Max: 98.4 (20 Aug 2018 15:55)  HR: 86 (20 Aug 2018 15:55) (86 - 86)  BP: 140/73 (20 Aug 2018 15:55) (140/73 - 140/73)  BP(mean): --  RR: 16 (20 Aug 2018 15:55) (16 - 16)  SpO2: 100% (20 Aug 2018 15:55) (100% - 100%)    General Exam:   General appearance: No acute distress                   Neurological Exam:  Mental Status: Orientated to self, date and place.    No dysarthria, aphasia or neglect.      Cranial Nerves: CN I - not tested.  PERRL, EOMI, VFF, no nystagmus or diplopia.  No APD.    Fundi not visualized bilaterally.    No facial asymmetry.  Hearing intact to finger rub bilaterally.     Motor:   Tone: normal.                  Strength:     [] Upper extremity                      Delt       Bicep    Tricep                                                  R         5/5 5/5        5/5       5/5                                               L          5/5        5/5        5/5       5/5  [] Lower extremity                       HF          KE          KF        DF         PF                                               R        5/5 5/5        5/5 5/5       5/5                                               L         5/5 5/5       5/5       5/5        5/5  Pronator drift: none                 Dysmetria: BL NL FTN  No truncal ataxia.    Tremor: No resting, postural or action tremor.  No myoclonus.    Sensation: intact to light touch, pinprick, vibration and proprioception    Deep Tendon Reflexes:   Right 2+ : BC, TC, BRD   Left 2+ : BC, TC, BRD  Right 2+ Knee, 1+ ankle  Left 2+ Knee, 1+ ankle    Toes flexor bilaterally  Gait: normal and stable.      Other:    08-20    143  |  105  |  31<H>  ----------------------------<  96  5.1   |  22  |  1.71<H>    Ca    10.4      20 Aug 2018 21:23    TPro  8.4<H>  /  Alb  4.6  /  TBili  0.3  /  DBili  x   /  AST  25  /  ALT  16  /  AlkPhos  77  08-20                          12.5   10.23 )-----------( 332      ( 20 Aug 2018 21:23 )             41.4       Radiology  < from: CT Head No Cont (08.20.18 @ 20:22) >  IMPRESSION: Large ventricles out of proportion to the degree of sulcal   prominence raising the possibility of normal pressure hydrocephalus or   arrested hydrocephalus versus atrophy. No change since 5/3/2018    < end of copied text >

## 2018-08-20 NOTE — ED PROVIDER NOTE - OBJECTIVE STATEMENT
62 yo M w/ PMHx of HTN, HLD, CAD, s/p stent, presents w/ R sided HA x 1mo. Pt was seen in another hospital in May for chest pain. While in hospital, pt mentioned having a HA, had CT of head and was told he had "extra fluid" on the head CT. He had no f/u but now presents to ED because the headache has continued. Complains of weakness of right side, intermittent nausea/vomiting, blurry vision, vertigo, and dizzy sensation when walking.  Denies chest pain, SOB, syncope, fever, chills, any other complaints. NKDA.

## 2018-08-20 NOTE — CONSULT NOTE ADULT - ASSESSMENT
62 yo rh  male, with PMHx of HTN, HLP, CAD s/p stent p/w right sided headache, which is being treated in ED, during the work up to rule structural causes, CTH was performed which showed moderately enlarged ventricles, through out the brain ventricular system and symmetrically. No Hx of head trauma or SAH, had a CT H 3 m ago which showed similar findings with no significant changes compare to this time. Denies bladder incontinence, gait abnormality or memory impairment. Exam is neurologically WNL with arthritic limitation of the gait and force. CTH shows stable enlarged ventricles.   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 Villasenor [phone] as an outpatient

## 2018-08-20 NOTE — ED PROVIDER NOTE - PROGRESS NOTE DETAILS
Mike:  [phone], Dr. Villasenor Mike:  neurology evaluated patient in ED, likely congenital hydrocephalus, stable CT since May.  May follow up with neurology as outpatient:  [phone], Dr. Villasenor

## 2018-08-20 NOTE — ED PROVIDER NOTE - MEDICAL DECISION MAKING DETAILS
62 yo M w/ R sided HA. Vertigo likely due to component of increased fluid seen on head CT. Will do Head CT, neurology consult. 64 yo M w/ R sided HA and vertigo-like symptoms.  - concern for possible hydrocephalus vs other intracranial pathology  - Head CT, neurology consult.

## 2019-03-01 ENCOUNTER — OUTPATIENT (OUTPATIENT)
Dept: OUTPATIENT SERVICES | Facility: HOSPITAL | Age: 64
LOS: 1 days | End: 2019-03-01
Payer: MEDICAID

## 2019-03-01 DIAGNOSIS — Z95.5 PRESENCE OF CORONARY ANGIOPLASTY IMPLANT AND GRAFT: Chronic | ICD-10-CM

## 2019-03-01 DIAGNOSIS — Z95.1 PRESENCE OF AORTOCORONARY BYPASS GRAFT: Chronic | ICD-10-CM

## 2019-05-22 NOTE — H&P ADULT - HISTORY OF PRESENT ILLNESS
Due for office visit. May prescribe 30 days. 63y/o M w/ PMHx CAD s/p CABG and stents (most recently 2 days ago to mLAD), HTN, HLD, gout presents to the ED for chest pain. Pt states he has left sided chest pain with radiation to the left arm, leg and neck and back. Pt states the chest pain is pleuritic, nonpositional, and reproducible. Pt states he took his ASA and plavix today. Pt denies LOC, syncope, fever, chills, N/V/D/C, shortness of breath, palpitations, numbness, tingling, dysuria, urinary/bowel incontinence, weakness or any other complaints at this time.

## 2019-07-18 DIAGNOSIS — Z71.89 OTHER SPECIFIED COUNSELING: ICD-10-CM

## 2019-07-18 PROBLEM — Z00.00 ENCOUNTER FOR PREVENTIVE HEALTH EXAMINATION: Status: ACTIVE | Noted: 2019-07-18

## 2019-07-23 ENCOUNTER — APPOINTMENT (OUTPATIENT)
Dept: NEUROSURGERY | Facility: CLINIC | Age: 64
End: 2019-07-23
Payer: COMMERCIAL

## 2019-07-23 VITALS
HEIGHT: 62 IN | OXYGEN SATURATION: 100 % | SYSTOLIC BLOOD PRESSURE: 127 MMHG | TEMPERATURE: 97.8 F | WEIGHT: 140 LBS | HEART RATE: 57 BPM | DIASTOLIC BLOOD PRESSURE: 76 MMHG | RESPIRATION RATE: 16 BRPM | BODY MASS INDEX: 25.76 KG/M2

## 2019-07-23 DIAGNOSIS — Z87.39 PERSONAL HISTORY OF OTHER DISEASES OF THE MUSCULOSKELETAL SYSTEM AND CONNECTIVE TISSUE: ICD-10-CM

## 2019-07-23 DIAGNOSIS — I10 ESSENTIAL (PRIMARY) HYPERTENSION: ICD-10-CM

## 2019-07-23 DIAGNOSIS — E78.5 HYPERLIPIDEMIA, UNSPECIFIED: ICD-10-CM

## 2019-07-23 PROCEDURE — 99024 POSTOP FOLLOW-UP VISIT: CPT

## 2019-07-23 RX ORDER — KRILL/OM-3/DHA/EPA/PHOSPHO/AST 1000-230MG
CAPSULE ORAL
Refills: 0 | Status: ACTIVE | COMMUNITY

## 2019-07-23 RX ORDER — IBUPROFEN 200 MG/1
CAPSULE, LIQUID FILLED ORAL
Refills: 0 | Status: ACTIVE | COMMUNITY

## 2019-07-23 RX ORDER — METOPROLOL TARTRATE 75 MG/1
TABLET, FILM COATED ORAL
Refills: 0 | Status: ACTIVE | COMMUNITY

## 2019-07-23 RX ORDER — COLCHICINE 0.6 MG/1
TABLET ORAL
Refills: 0 | Status: ACTIVE | COMMUNITY

## 2019-07-23 NOTE — PHYSICAL EXAM
[General Appearance - Alert] : alert [General Appearance - Well Nourished] : well nourished [General Appearance - Well Developed] : well developed [General Appearance - In No Acute Distress] : in no acute distress [Longitudinal] : longitudinal [Clean] : clean [Dry] : dry [Healing Well] : healing well [Staple Intact] : closed with intact staples [No Drainage] : without drainage [Normal Skin] : normal [Oriented To Time, Place, And Person] : oriented to person, place, and time [Sensation Tactile Decrease] : light touch was intact [Motor Handedness Left-Handed] : the patient is left hand dominant [Balance] : balance was intact [No Visual Abnormalities] : no visible abnormailities [Outer Ear] : the ears and nose were normal in appearance [Intact] : all sensory within normal limits bilaterally [Sclera] : the sclera and conjunctiva were normal [] : no respiratory distress [Neck Appearance] : the appearance of the neck was normal [Skin Color & Pigmentation] : normal skin color and pigmentation [Abnormal Walk] : normal gait [FreeTextEntry1] : Flat affect [FreeTextEntry6] : staples removed without difficulty

## 2019-07-23 NOTE — ASSESSMENT
[FreeTextEntry1] : Shower daily. Wash hair with mild shampoo, i.e. [employer].  Pat incision line dry with dry separate towel.\par Surgical site healing with no s/s infection.\par Report all s/s infection including drainage, redness, warmth, fever, weakness, chills.\par Follow-up with Dr. Garces in 2 weeks for post-op visit- scheduled for 8/5/19.\par No tub baths/pools for 8 weeks.\par Keep incision covered and protected from sun for 3-6 months following surgery.\par \par Patient verbalizes agreement and understanding with plan.\par \par \par

## 2019-07-23 NOTE — REASON FOR VISIT
[Family Member] : family member [de-identified] : Ventriculocisternostomy of third ventricle with use of neuroendoscope; ventricular puncture with placement of external ventricular catheter, endoscope assisted; use of frameless stereotactic navigation [de-identified] : 7/11/19 [de-identified] : Presents today for staple removal.\par Reports he is doing well, still has complaints of occasional frontal h/a relieved with motrin.\par Denies any signs of postop wound infection which could include but is not limited to redness/swelling/purulent drainage\par Denies CP/SOB/unilateral leg edema. Denies nausea, vomiting, dizziness, weakness, vertigo and gait instability. \par He is slowly introducing preop activities\par \par Reports improvement in walking.\par

## 2019-07-23 NOTE — HISTORY OF PRESENT ILLNESS
[FreeTextEntry1] : 63 year old man who presented to [facility] with headaches, gait imbalance and vertigo. He was found to have lateral and third ventriculomegaly. The radiographic pattern of ventriculomegaly suggested obstructive hydrocephalus as the fourth ventricle is relatively smaller. \par \par He underwent third ventriculostomy to treat symptomatic hydrocephalus and presents today for staple removal. Surgical site is clean, dry and intact with no s/s of infection. Staples removed without difficulty, area cleansed with betadine, patient tolerated well.

## 2019-08-01 PROCEDURE — G9005: CPT

## 2019-08-05 ENCOUNTER — APPOINTMENT (OUTPATIENT)
Dept: NEUROSURGERY | Facility: CLINIC | Age: 64
End: 2019-08-05
Payer: COMMERCIAL

## 2019-08-05 VITALS
TEMPERATURE: 98.1 F | WEIGHT: 140 LBS | SYSTOLIC BLOOD PRESSURE: 158 MMHG | RESPIRATION RATE: 16 BRPM | DIASTOLIC BLOOD PRESSURE: 80 MMHG | OXYGEN SATURATION: 99 % | HEIGHT: 62 IN | HEART RATE: 81 BPM | BODY MASS INDEX: 25.76 KG/M2

## 2019-08-05 DIAGNOSIS — G91.9 HYDROCEPHALUS, UNSPECIFIED: ICD-10-CM

## 2019-08-05 PROCEDURE — 99024 POSTOP FOLLOW-UP VISIT: CPT

## 2019-08-08 NOTE — REASON FOR VISIT
[Family Member] : family member [de-identified] : Ventriculocisternostomy of third ventricle with use of neuroendoscope; ventricular puncture with placement of external ventricular catheter, endoscope assisted; use of frameless stereotactic navigation [de-identified] : 7/11/19 [de-identified] : \par TODAY'S VISIT:\par Reports occasional vertigo. However, he states this is significantly improved from initial presentation preoperatively. \par Denies difficulty walking.\par Reports occasional mild headaches, relieved with medication.\par \par PREVIOUS OFFICE VISIT 7/23/19:\par Presents today for staple removal.\par Reports he is doing well, still has complaints of occasional frontal h/a relieved with motrin.\par Denies any signs of postop wound infection which could include but is not limited to redness/swelling/purulent drainage\par Denies CP/SOB/unilateral leg edema. Denies nausea, vomiting, dizziness, weakness, vertigo and gait instability. \par He is slowly introducing preop activities\par \par Reports improvement in walking.\par

## 2019-08-08 NOTE — ADDENDUM
[FreeTextEntry1] : 	2019 \par  \par Re:	Oksana Garciagimaria t  \par :	53 \par MRN:  [id number] \par  \par  \par Mr. Garcia is a 64-year-old man who underwent endoscopic third ventriculostomy on 2019, to treat his hydrocephalus. Today he indicates that he is getting back to his usual preoperative activities. He feels that his ambulation is significantly improved. He does not have headaches anymore. He does have some residual dizziness; however he feels it is much improved compared to preoperatively. \par  \par On examination today he is nonfocal.  \par  \par I have encouraged him to begin a physical therapy regimen, and I will give him a prescription for this. I would like him to do this for the next 3 months, 2-3 times per week, and I will plan to follow up with him at the end of 3 months. \par  \par If he continues to have persistent symptoms of vertigo, I would like a followup scan to be done at which point we will decide if he remains a candidate for  shunt, although I believe his ETV to be fully functional at this point. \par  \par Jeff Garces M.D. \par  of Neurosurgery \par [facility] [employer] [facility] \[facility] Department of Neurosurgery \par [facility] \par [address] \par [facility], Third Floor \[place] [address] \par Tel: [phone] \par Email:  [email or web address] \par  \par  \par SHANELL/trevor [facility] #[id number] \par  \par  \par

## 2019-08-08 NOTE — HISTORY OF PRESENT ILLNESS
[FreeTextEntry1] : 63 year old man who presented to [facility] with headaches, gait imbalance and vertigo. He was found to have lateral and third ventriculomegaly. The radiographic pattern of ventriculomegaly suggested obstructive hydrocephalus as the fourth ventricle is relatively smaller. \par \par He underwent third ventriculostomy to treat symptomatic hydrocephalus and presents today for staple removal. Surgical site is clean, dry and intact with no s/s of infection. Staples removed without difficulty, area cleansed with betadine, patient tolerated well.

## 2019-08-08 NOTE — PHYSICAL EXAM
[General Appearance - Alert] : alert [General Appearance - In No Acute Distress] : in no acute distress [Clean] : clean [Dry] : dry [Healing Well] : healing well [No Drainage] : without drainage [Intact] : intact [Normal Skin] : normal [Oriented To Time, Place, And Person] : oriented to person, place, and time [Cranial Nerves Optic (II)] : visual acuity intact bilaterally,  pupils equal round and reactive to light [Cranial Nerves Oculomotor (III)] : extraocular motion intact [Cranial Nerves Trigeminal (V)] : facial sensation intact symmetrically [Cranial Nerves Facial (VII)] : face symmetrical [Cranial Nerves Vestibulocochlear (VIII)] : hearing was intact bilaterally [Cranial Nerves Glossopharyngeal (IX)] : tongue and palate midline [Cranial Nerves Accessory (XI - Cranial And Spinal)] : head turning and shoulder shrug symmetric [Cranial Nerves Hypoglossal (XII)] : there was no tongue deviation with protrusion [Motor Strength] : muscle strength was normal in all four extremities [Motor Tone] : muscle tone was normal in all four extremities [Sclera] : the sclera and conjunctiva were normal [Outer Ear] : the ears and nose were normal in appearance [Neck Appearance] : the appearance of the neck was normal [] : no respiratory distress [Respiration, Rhythm And Depth] : normal respiratory rhythm and effort [Heart Rate And Rhythm] : heart rate was normal and rhythm regular [Abdomen Soft] : soft [Abnormal Walk] : normal gait [Skin Color & Pigmentation] : normal skin color and pigmentation [FreeTextEntry1] : Frontal head

## 2019-08-08 NOTE — ASSESSMENT
[FreeTextEntry1] : Patient is doing well; he reports improvement in vertigo and headaches.\par Shower daily. Wash hair with mild shampoo, i.e. Mann and [employer].  Pat incision line dry with dry separate towel.\par No tub baths/pools for 8 weeks.\par Keep incision covered and protected from sun for 3-6 months following surgery.\par Recommend physical therapy for gait training, balance and coordination, muscular strengthening.\par Return to the office in 3 months to evaluate progress.\par \par Plan:\par 1. Physical therapy\par 2. Return to the office in 3 months to see Dr. Garces\par Patient verbalizes agreement and understanding with plan.\par

## 2019-09-01 ENCOUNTER — OUTPATIENT (OUTPATIENT)
Dept: OUTPATIENT SERVICES | Facility: HOSPITAL | Age: 64
LOS: 1 days | End: 2019-09-01
Payer: MEDICARE

## 2019-09-01 DIAGNOSIS — Z95.1 PRESENCE OF AORTOCORONARY BYPASS GRAFT: Chronic | ICD-10-CM

## 2019-09-01 DIAGNOSIS — Z95.5 PRESENCE OF CORONARY ANGIOPLASTY IMPLANT AND GRAFT: Chronic | ICD-10-CM

## 2019-09-01 PROCEDURE — G9005: CPT

## 2019-09-03 ENCOUNTER — INPATIENT (INPATIENT)
Facility: HOSPITAL | Age: 64
LOS: 0 days | Discharge: HOME | DRG: 303 | End: 2019-09-04
Attending: HOSPITALIST | Admitting: HOSPITALIST
Payer: MEDICARE

## 2019-09-03 VITALS
OXYGEN SATURATION: 99 % | HEIGHT: 62 IN | DIASTOLIC BLOOD PRESSURE: 85 MMHG | TEMPERATURE: 98 F | WEIGHT: 139.99 LBS | HEART RATE: 70 BPM | RESPIRATION RATE: 16 BRPM | SYSTOLIC BLOOD PRESSURE: 131 MMHG

## 2019-09-03 DIAGNOSIS — M10.9 GOUT, UNSPECIFIED: ICD-10-CM

## 2019-09-03 DIAGNOSIS — N18.3 CHRONIC KIDNEY DISEASE, STAGE 3 (MODERATE): ICD-10-CM

## 2019-09-03 DIAGNOSIS — Z29.9 ENCOUNTER FOR PROPHYLACTIC MEASURES, UNSPECIFIED: ICD-10-CM

## 2019-09-03 DIAGNOSIS — Z95.1 PRESENCE OF AORTOCORONARY BYPASS GRAFT: Chronic | ICD-10-CM

## 2019-09-03 DIAGNOSIS — I10 ESSENTIAL (PRIMARY) HYPERTENSION: ICD-10-CM

## 2019-09-03 DIAGNOSIS — Z95.5 PRESENCE OF CORONARY ANGIOPLASTY IMPLANT AND GRAFT: Chronic | ICD-10-CM

## 2019-09-03 DIAGNOSIS — D64.9 ANEMIA, UNSPECIFIED: ICD-10-CM

## 2019-09-03 DIAGNOSIS — E78.5 HYPERLIPIDEMIA, UNSPECIFIED: ICD-10-CM

## 2019-09-03 DIAGNOSIS — I20.0 UNSTABLE ANGINA: ICD-10-CM

## 2019-09-03 LAB
ALBUMIN SERPL ELPH-MCNC: 4.5 G/DL — SIGNIFICANT CHANGE UP (ref 3.3–5)
ALP SERPL-CCNC: 83 U/L — SIGNIFICANT CHANGE UP (ref 40–120)
ALT FLD-CCNC: 8 U/L — LOW (ref 10–45)
ANION GAP SERPL CALC-SCNC: 12 MMOL/L — SIGNIFICANT CHANGE UP (ref 5–17)
APTT BLD: 137 SEC — CRITICAL HIGH (ref 27.5–36.3)
AST SERPL-CCNC: 14 U/L — SIGNIFICANT CHANGE UP (ref 10–40)
BASOPHILS # BLD AUTO: 0 K/UL — SIGNIFICANT CHANGE UP (ref 0–0.2)
BASOPHILS # BLD AUTO: 0.1 K/UL — SIGNIFICANT CHANGE UP (ref 0–0.2)
BASOPHILS NFR BLD AUTO: 0.3 % — SIGNIFICANT CHANGE UP (ref 0–2)
BASOPHILS NFR BLD AUTO: 0.9 % — SIGNIFICANT CHANGE UP (ref 0–2)
BILIRUB SERPL-MCNC: 0.4 MG/DL — SIGNIFICANT CHANGE UP (ref 0.2–1.2)
BLD GP AB SCN SERPL QL: NEGATIVE — SIGNIFICANT CHANGE UP
BUN SERPL-MCNC: 17 MG/DL — SIGNIFICANT CHANGE UP (ref 7–23)
CALCIUM SERPL-MCNC: 10.2 MG/DL — SIGNIFICANT CHANGE UP (ref 8.4–10.5)
CHLORIDE SERPL-SCNC: 104 MMOL/L — SIGNIFICANT CHANGE UP (ref 96–108)
CO2 SERPL-SCNC: 22 MMOL/L — SIGNIFICANT CHANGE UP (ref 22–31)
CREAT SERPL-MCNC: 1.45 MG/DL — HIGH (ref 0.5–1.3)
EOSINOPHIL # BLD AUTO: 0.4 K/UL — SIGNIFICANT CHANGE UP (ref 0–0.5)
EOSINOPHIL # BLD AUTO: 0.6 K/UL — HIGH (ref 0–0.5)
EOSINOPHIL NFR BLD AUTO: 4.2 % — SIGNIFICANT CHANGE UP (ref 0–6)
EOSINOPHIL NFR BLD AUTO: 6.5 % — HIGH (ref 0–6)
GLUCOSE SERPL-MCNC: 93 MG/DL — SIGNIFICANT CHANGE UP (ref 70–99)
HCT VFR BLD CALC: 35 % — LOW (ref 39–50)
HCT VFR BLD CALC: 35.1 % — LOW (ref 39–50)
HCT VFR BLD CALC: 35.8 % — LOW (ref 39–50)
HGB BLD-MCNC: 10.5 G/DL — LOW (ref 13–17)
HGB BLD-MCNC: 10.6 G/DL — LOW (ref 13–17)
HGB BLD-MCNC: 10.8 G/DL — LOW (ref 13–17)
INR BLD: 1.06 RATIO — SIGNIFICANT CHANGE UP (ref 0.88–1.16)
LYMPHOCYTES # BLD AUTO: 2.8 K/UL — SIGNIFICANT CHANGE UP (ref 1–3.3)
LYMPHOCYTES # BLD AUTO: 26.6 % — SIGNIFICANT CHANGE UP (ref 13–44)
LYMPHOCYTES # BLD AUTO: 3.1 K/UL — SIGNIFICANT CHANGE UP (ref 1–3.3)
LYMPHOCYTES # BLD AUTO: 33.4 % — SIGNIFICANT CHANGE UP (ref 13–44)
MCHC RBC-ENTMCNC: 22.5 PG — LOW (ref 27–34)
MCHC RBC-ENTMCNC: 22.8 PG — LOW (ref 27–34)
MCHC RBC-ENTMCNC: 23.1 PG — LOW (ref 27–34)
MCHC RBC-ENTMCNC: 30 GM/DL — LOW (ref 32–36)
MCHC RBC-ENTMCNC: 30 GM/DL — LOW (ref 32–36)
MCHC RBC-ENTMCNC: 30.2 GM/DL — LOW (ref 32–36)
MCV RBC AUTO: 74.6 FL — LOW (ref 80–100)
MCV RBC AUTO: 76 FL — LOW (ref 80–100)
MCV RBC AUTO: 77 FL — LOW (ref 80–100)
MONOCYTES # BLD AUTO: 0.8 K/UL — SIGNIFICANT CHANGE UP (ref 0–0.9)
MONOCYTES # BLD AUTO: 0.8 K/UL — SIGNIFICANT CHANGE UP (ref 0–0.9)
MONOCYTES NFR BLD AUTO: 7.4 % — SIGNIFICANT CHANGE UP (ref 2–14)
MONOCYTES NFR BLD AUTO: 8.3 % — SIGNIFICANT CHANGE UP (ref 2–14)
NEUTROPHILS # BLD AUTO: 4.8 K/UL — SIGNIFICANT CHANGE UP (ref 1.8–7.4)
NEUTROPHILS # BLD AUTO: 6.4 K/UL — SIGNIFICANT CHANGE UP (ref 1.8–7.4)
NEUTROPHILS NFR BLD AUTO: 50.9 % — SIGNIFICANT CHANGE UP (ref 43–77)
NEUTROPHILS NFR BLD AUTO: 61.6 % — SIGNIFICANT CHANGE UP (ref 43–77)
PLATELET # BLD AUTO: 338 K/UL — SIGNIFICANT CHANGE UP (ref 150–400)
PLATELET # BLD AUTO: 381 K/UL — SIGNIFICANT CHANGE UP (ref 150–400)
PLATELET # BLD AUTO: 391 K/UL — SIGNIFICANT CHANGE UP (ref 150–400)
POTASSIUM SERPL-MCNC: 4.7 MMOL/L — SIGNIFICANT CHANGE UP (ref 3.5–5.3)
POTASSIUM SERPL-SCNC: 4.7 MMOL/L — SIGNIFICANT CHANGE UP (ref 3.5–5.3)
PROT SERPL-MCNC: 7.7 G/DL — SIGNIFICANT CHANGE UP (ref 6–8.3)
PROTHROM AB SERPL-ACNC: 12.1 SEC — SIGNIFICANT CHANGE UP (ref 10–12.9)
RBC # BLD: 4.56 M/UL — SIGNIFICANT CHANGE UP (ref 4.2–5.8)
RBC # BLD: 4.69 M/UL — SIGNIFICANT CHANGE UP (ref 4.2–5.8)
RBC # BLD: 4.72 M/UL — SIGNIFICANT CHANGE UP (ref 4.2–5.8)
RBC # FLD: 16.1 % — HIGH (ref 10.3–14.5)
RBC # FLD: 16.1 % — HIGH (ref 10.3–14.5)
RBC # FLD: 16.2 % — HIGH (ref 10.3–14.5)
RH IG SCN BLD-IMP: POSITIVE — SIGNIFICANT CHANGE UP
SODIUM SERPL-SCNC: 138 MMOL/L — SIGNIFICANT CHANGE UP (ref 135–145)
TROPONIN T, HIGH SENSITIVITY RESULT: 18 NG/L — SIGNIFICANT CHANGE UP (ref 0–51)
TROPONIN T, HIGH SENSITIVITY RESULT: 19 NG/L — SIGNIFICANT CHANGE UP (ref 0–51)
WBC # BLD: 10.3 K/UL — SIGNIFICANT CHANGE UP (ref 3.8–10.5)
WBC # BLD: 9.1 K/UL — SIGNIFICANT CHANGE UP (ref 3.8–10.5)
WBC # BLD: 9.4 K/UL — SIGNIFICANT CHANGE UP (ref 3.8–10.5)
WBC # FLD AUTO: 10.3 K/UL — SIGNIFICANT CHANGE UP (ref 3.8–10.5)
WBC # FLD AUTO: 9.1 K/UL — SIGNIFICANT CHANGE UP (ref 3.8–10.5)
WBC # FLD AUTO: 9.4 K/UL — SIGNIFICANT CHANGE UP (ref 3.8–10.5)

## 2019-09-03 PROCEDURE — 99223 1ST HOSP IP/OBS HIGH 75: CPT | Mod: AI,GC

## 2019-09-03 PROCEDURE — 99223 1ST HOSP IP/OBS HIGH 75: CPT

## 2019-09-03 PROCEDURE — 93010 ELECTROCARDIOGRAM REPORT: CPT

## 2019-09-03 PROCEDURE — 71045 X-RAY EXAM CHEST 1 VIEW: CPT | Mod: 26

## 2019-09-03 PROCEDURE — 99285 EMERGENCY DEPT VISIT HI MDM: CPT | Mod: 25

## 2019-09-03 RX ORDER — ATORVASTATIN CALCIUM 80 MG/1
80 TABLET, FILM COATED ORAL AT BEDTIME
Refills: 0 | Status: DISCONTINUED | OUTPATIENT
Start: 2019-09-03 | End: 2019-09-04

## 2019-09-03 RX ORDER — HEPARIN SODIUM 5000 [USP'U]/ML
INJECTION INTRAVENOUS; SUBCUTANEOUS
Qty: 25000 | Refills: 0 | Status: DISCONTINUED | OUTPATIENT
Start: 2019-09-03 | End: 2019-09-03

## 2019-09-03 RX ORDER — ACETAMINOPHEN 500 MG
650 TABLET ORAL EVERY 6 HOURS
Refills: 0 | Status: DISCONTINUED | OUTPATIENT
Start: 2019-09-03 | End: 2019-09-04

## 2019-09-03 RX ORDER — METOPROLOL TARTRATE 50 MG
12.5 TABLET ORAL
Refills: 0 | Status: DISCONTINUED | OUTPATIENT
Start: 2019-09-03 | End: 2019-09-04

## 2019-09-03 RX ORDER — FEBUXOSTAT 40 MG/1
1 TABLET ORAL
Qty: 0 | Refills: 0 | DISCHARGE

## 2019-09-03 RX ORDER — ONDANSETRON 8 MG/1
4 TABLET, FILM COATED ORAL EVERY 6 HOURS
Refills: 0 | Status: DISCONTINUED | OUTPATIENT
Start: 2019-09-03 | End: 2019-09-04

## 2019-09-03 RX ORDER — SUCRALFATE 1 G
1 TABLET ORAL
Refills: 0 | Status: DISCONTINUED | OUTPATIENT
Start: 2019-09-03 | End: 2019-09-04

## 2019-09-03 RX ORDER — LISINOPRIL 2.5 MG/1
5 TABLET ORAL DAILY
Refills: 0 | Status: DISCONTINUED | OUTPATIENT
Start: 2019-09-03 | End: 2019-09-04

## 2019-09-03 RX ORDER — NITROGLYCERIN 6.5 MG
0.4 CAPSULE, EXTENDED RELEASE ORAL
Refills: 0 | Status: DISCONTINUED | OUTPATIENT
Start: 2019-09-03 | End: 2019-09-04

## 2019-09-03 RX ORDER — ASPIRIN/CALCIUM CARB/MAGNESIUM 324 MG
81 TABLET ORAL DAILY
Refills: 0 | Status: DISCONTINUED | OUTPATIENT
Start: 2019-09-04 | End: 2019-09-04

## 2019-09-03 RX ORDER — PANTOPRAZOLE SODIUM 20 MG/1
40 TABLET, DELAYED RELEASE ORAL
Refills: 0 | Status: DISCONTINUED | OUTPATIENT
Start: 2019-09-03 | End: 2019-09-04

## 2019-09-03 RX ORDER — HEPARIN SODIUM 5000 [USP'U]/ML
3800 INJECTION INTRAVENOUS; SUBCUTANEOUS EVERY 6 HOURS
Refills: 0 | Status: DISCONTINUED | OUTPATIENT
Start: 2019-09-03 | End: 2019-09-03

## 2019-09-03 RX ORDER — DOCUSATE SODIUM 100 MG
100 CAPSULE ORAL THREE TIMES A DAY
Refills: 0 | Status: DISCONTINUED | OUTPATIENT
Start: 2019-09-03 | End: 2019-09-04

## 2019-09-03 RX ORDER — ACETAMINOPHEN 500 MG
975 TABLET ORAL ONCE
Refills: 0 | Status: COMPLETED | OUTPATIENT
Start: 2019-09-03 | End: 2019-09-03

## 2019-09-03 RX ORDER — HEPARIN SODIUM 5000 [USP'U]/ML
3800 INJECTION INTRAVENOUS; SUBCUTANEOUS ONCE
Refills: 0 | Status: DISCONTINUED | OUTPATIENT
Start: 2019-09-03 | End: 2019-09-03

## 2019-09-03 RX ORDER — INFLUENZA VIRUS VACCINE 15; 15; 15; 15 UG/.5ML; UG/.5ML; UG/.5ML; UG/.5ML
0.5 SUSPENSION INTRAMUSCULAR ONCE
Refills: 0 | Status: COMPLETED | OUTPATIENT
Start: 2019-09-03 | End: 2019-09-04

## 2019-09-03 RX ADMIN — ATORVASTATIN CALCIUM 80 MILLIGRAM(S): 80 TABLET, FILM COATED ORAL at 21:05

## 2019-09-03 RX ADMIN — Medication 650 MILLIGRAM(S): at 21:37

## 2019-09-03 RX ADMIN — HEPARIN SODIUM 750 UNIT(S)/HR: 5000 INJECTION INTRAVENOUS; SUBCUTANEOUS at 06:12

## 2019-09-03 RX ADMIN — Medication 650 MILLIGRAM(S): at 21:07

## 2019-09-03 RX ADMIN — PANTOPRAZOLE SODIUM 40 MILLIGRAM(S): 20 TABLET, DELAYED RELEASE ORAL at 18:27

## 2019-09-03 RX ADMIN — Medication 12.5 MILLIGRAM(S): at 18:26

## 2019-09-03 RX ADMIN — Medication 0.4 MILLIGRAM(S): at 10:55

## 2019-09-03 RX ADMIN — LISINOPRIL 5 MILLIGRAM(S): 2.5 TABLET ORAL at 18:26

## 2019-09-03 NOTE — H&P ADULT - HISTORY OF PRESENT ILLNESS
63yo M w/ HTN, HLD, CAD s/p 4 stents, CABG (2015) p/w 1d of substernal chest pain. Patient initially presented to [facility] with the cp described as 8/10, non-radiating, dull, intermittent pain at rest a/w vomiting. EKG did not show ischemic changes, troponin was negative. Patient was given heparin bolus and gtt, ASA, plavix, morphine 2 IV, nitrostat x2. Patient accepted by [facility] cardiology for cardiac cath. Repeat EKG here unremarkable. Trops negative. Patient’s cp is improved to 4/10 this AM. Denies other associated symptoms including sob, diaphoresis, palpitations, fevers, chills, cough, abd pain, urinary symptoms, or focal neurologic symptoms.    In ED: T97.8 HR 70 /85 RR 16 O2sat 99% RA. 65yo M w/ HTN, HLD, CAD s/p 4 stents, CABG (2015) p/w 1d of substernal chest pain. Patient initially presented to [facility] with the cp described as 8/10, non-radiating, dull, intermittent pain at rest a/w NBNB vomiting. EKG did not show ischemic changes, troponin was negative. Patient was given heparin bolus and gtt, ASA, plavix, morphine 2 IV, nitrostat x2. Patient accepted by [facility] cardiology for cardiac cath. Repeat EKG here unremarkable. Trops negative. Patient’s cp is improved to 4/10 this AM. Denies other associated symptoms including sob, diaphoresis, palpitations, fevers, chills, cough, abd pain, urinary symptoms, or focal neurologic symptoms. Patient states he has not been taking plavix because of prior UGIB.    In ED: T97.8 HR 70 /85 RR 16 O2sat 99% RA.

## 2019-09-03 NOTE — ED PROVIDER NOTE - PHYSICAL EXAMINATION
CONSTITUTIONAL: Nontoxic, well nourished, well developed, elderly male, resting comfortably in no acute distress  HEAD: Normocephalic; atraumatic  EYES: Normal inspection, EOMI  ENMT: External appears normal; normal oropharynx  NECK: Supple; non-tender; no cervical lymphadenopathy  CARD: RRR; no audible murmurs, rubs, or gallops  RESP: No respiratory distress, lungs ctab/l  ABD: Soft, non-distended; non-tender; no rebound or guarding  EXT: No LE pitting edema or calf tenderness; distal pulses intact with good capillary refill  SKIN: Warm, dry, intact  NEURO: aaox3, moving all extremities spontaneously

## 2019-09-03 NOTE — CONSULT NOTE ADULT - ASSESSMENT
65 yo M with PMH CAD s/p CABG 2015 and PCI on 5/2018, HTN, HLD, and GIB presenting with angina found to have no elevated troponins, no ST elevation, and a hgb drop from 12.5 > 10.8. This is likely demand mediated ischemia in the setting of acute onset anemia 2/2 GIB.    #Demand mediated ischemia 2/2 GIB  -Treatment of precipitating anemia per primary team  -Consider GI consult  -Unlikely to cath today  -Will discuss plan with attending    Jaime Hess, PGY1 63 yo M with PMH CAD s/p CABG 2015 and PCI on 5/2018, HTN, HLD, and GIB presenting with angina found to have no elevated troponins, no ST elevation, and a hgb drop from 12.5 > 10.8. This is likely demand mediated ischemia in the setting of acute onset anemia 2/2 GIB.    #Demand mediated ischemia 2/2 GIB  -Treatment of precipitating anemia per primary team  -Consider GI consult  -D/c heparin gtt  -D/c hold plavix in setting of GIB  -Unlikely to cath today  -Will discuss plan with attending    Jaime Hess, PGY1 63 yo M with PMH CAD s/p CABG 2015 and PCI on 5/2018, HTN, HLD, and GIB presenting with angina found to have no elevated troponins, no ST elevation, and a hgb drop from 12.5 > 10.8. This is likely demand mediated ischemia in the setting of acute onset anemia 2/2 GIB.    #Demand mediated ischemia 2/2 GIB  -Treatment of precipitating anemia per primary team  -Consider GI consult  -D/c heparin gtt  -Hold plavix in setting of GIB  -Unlikely to cath today  -Will discuss plan with attending    Jaime Hess, PGY1 65 yo M with PMH CAD s/p CABG 2015 and PCI on 5/2018, HTN, HLD, and GIB presenting with angina found to have no elevated troponins, no ST elevation, and a hgb drop from 12.5 > 10.8. This is unlikely cardiac in etiology because objective cardiac markers are negative. Demand mediated ischemia is unlikely because of negative troponins.    #Chest pain  -Treatment of precipitating anemia per primary team  -Consider GI consult  -D/c heparin gtt  -Hold plavix in setting of GIB  -No role for cath  -He is cleared for endoscopy should it be required. He is class II risk for a minimally invasive procedure.    Jaime Hess, PGY1 63 yo M with PMH CAD s/p CABG 2015 and PCI on 5/2018, HTN, HLD, and GIB presenting with chest pain found to have no elevated troponins, no ST elevation, and a hgb drop from 12.5 > 10.8. This is unlikely cardiac in etiology because objective cardiac markers are negative, chest wall and back tenderness reproducing pain, and pleuritic nature of pain.    #Chest pain  -Treatment of precipitating anemia per primary team  -Consider GI consult  -D/c heparin gtt  -Hold plavix in setting of GIB  -No role for cath or stress testing  -He is cleared for endoscopy from a cardiac standpoint should it be required. He is RCRI 1 for a minimally invasive procedure.    Jaime Hess, PGY1

## 2019-09-03 NOTE — ED PROVIDER NOTE - ATTENDING CONTRIBUTION TO CARE
65 yo male hx of CAD/CABG p/w 1d chest pain, mid sternal, nonradiating, dull, intermittent.  Seen at OSH for same, EKG unremarkable, troponin negative, but started on heparin GTT, got plavix, ASA, and accepted by cardiology here for transfer/evaluation.  Repeat EKG here unremarkable, patient in NAD.  Will continue heparin GTT, check labs, cards consult and dispo accordingly.  Doubt PE, doubt dissection.

## 2019-09-03 NOTE — ED PROVIDER NOTE - PROGRESS NOTE DETAILS
Inder Miguel DO PGY2 - pt looks comfortable but still endorsing some pain - paging cards Inder Miguel DO PGY2 - cards aware of pt

## 2019-09-03 NOTE — ED PROVIDER NOTE - CLINICAL SUMMARY MEDICAL DECISION MAKING FREE TEXT BOX
Ghazala Adrian MD: 63 yo M w/ PMHx of HTN, HLD, CAD, s/p CABG and stent, CKD who presents with substernal chest pain since yesterday as transfer from [facility] for cardiac evaluation. Pt hemodynamically stable, afebrile. EKG and trop negative, but with persistent CP and extensive cardiac hx, concern for ACS. Plan: CBC, CMP, trop, continue heparin gtt, cardiology consult. Ghazala Adrian MD: 65 yo M w/ PMHx of HTN, HLD, CAD, s/p CABG and stent, CKD who presents with substernal chest pain since yesterday as transfer from [facility] for cardiac evaluation. Pt hemodynamically stable, afebrile. EKG and trop negative, but with persistent CP and extensive cardiac hx, concern for unstable angina. Plan: CBC, CMP, trop, continue heparin gtt, cardiology consult.

## 2019-09-03 NOTE — CONSULT NOTE ADULT - PROBLEM SELECTOR RECOMMENDATION 9
proton pump inhibitor twice a day  can add carafate 1g four times a day  this chest pain is not from reflux as he has no abdominal symptoms or complaints of reflux or dyspepsia  cardiac workup ongoing  monitor hgb, will consider repeat  upper gastrointestinal endoscopy if persistent melena or significant drop in hgb otherwise anemia workup can be completed as an outpatient

## 2019-09-03 NOTE — ED ADULT NURSE REASSESSMENT NOTE - NS ED NURSE REASSESS COMMENT FT1
D/c Heparin at 12:00 per MD order. Report given to [employer] 11:00 Pt reports no improvement in chest pain after Nitro SL. Dr Thakkar at bedside and aware.   12:00 D/c Heparin per MD order.   12:15 Report given to [employer]

## 2019-09-03 NOTE — H&P ADULT - PROBLEM SELECTOR PLAN 4
Stable CKD 3  - monitor BMP and UOP  - monitor for ONUR, patient given IVF at OSH - c/w lipitor 80 for now

## 2019-09-03 NOTE — ED ADULT NURSE NOTE - NSIMPLEMENTINTERV_GEN_ALL_ED
regular Implemented All Universal Safety Interventions:  [place] to call system. Call bell, personal items and telephone within reach. Instruct patient to call for assistance. Room bathroom lighting operational. Non-slip footwear when patient is off stretcher. Physically safe environment: no spills, clutter or unnecessary equipment. Stretcher in lowest position, wheels locked, appropriate side rails in place.

## 2019-09-03 NOTE — CONSULT NOTE ADULT - SUBJECTIVE AND OBJECTIVE BOX
[facility]  Yao Jimenez PA-C  [address] [place] [address]  [phone]      Chief Complaint:  Patient is a 64y old  Male who presents with a chief complaint of Unstable angina (03 Sep 2019 11:44)      HPI: 64M transfered from OSH for unstable angina and possible cardiac cath  he was started on a heparin drip and is currently still having chest pain  he does endorse dark stool over the weekend, he had an episode of hematemesis about 2 months ago, at that time he went to [facility] EGD was done which showed an ulcer, he was prescribed "medicine" which he states he takes    Allergies:  No Known Allergies      Medications:  acetaminophen   Tablet .. 650 milliGRAM(s) Oral every 6 hours PRN  atorvastatin 80 milliGRAM(s) Oral at bedtime  docusate sodium 100 milliGRAM(s) Oral three times a day PRN  influenza   Vaccine 0.5 milliLiter(s) IntraMuscular once  lisinopril 5 milliGRAM(s) Oral daily  metoprolol tartrate 12.5 milliGRAM(s) Oral two times a day  nitroglycerin     SubLingual 0.4 milliGRAM(s) SubLingual every 5 minutes PRN  ondansetron Injectable 4 milliGRAM(s) IV Push every 6 hours PRN  pantoprazole  Injectable 40 milliGRAM(s) IV Push two times a day      PMHX/PSHX:  Chronic gout of elbow, unspecified cause, unspecified laterality  HLD (hyperlipidemia)  CAD (coronary artery disease)  HTN (hypertension)  S/P CABG x 3  H/O heart artery stent      Family history:  No pertinent family history in first degree relatives      Social History:     ROS:     General:  No wt loss, fevers, chills, night sweats, fatigue,   Eyes:  Good vision, no reported pain  ENT:  No sore throat, pain, runny nose, dysphagia  CV:  No pain, palpitations, hypo/hypertension  Resp:  No dyspnea, cough, tachypnea, wheezing  GI:  No pain, No nausea, No vomiting, No diarrhea, No constipation, No weight loss, No fever, No pruritis, No rectal bleeding, No tarry stools, No dysphagia,  :  No pain, bleeding, incontinence, nocturia  Muscle:  No pain, weakness  Neuro:  No weakness, tingling, memory problems  Psych:  No fatigue, insomnia, mood problems, depression  Endocrine:  No polyuria, polydipsia, cold/heat intolerance  Heme:  No petechiae, ecchymosis, easy bruisability  Skin:  No rash, tattoos, scars, edema      PHYSICAL EXAM:   Vital Signs:  Vital Signs Last 24 Hrs  T(C): 36.6 (03 Sep 2019 12:21), Max: 36.9 (03 Sep 2019 06:02)  T(F): 97.8 (03 Sep 2019 12:21), Max: 98.4 (03 Sep 2019 06:02)  HR: 82 (03 Sep 2019 12:21) (69 - 82)  BP: 129/84 (03 Sep 2019 12:21) (120/65 - 136/79)  BP(mean): --  RR: 18 (03 Sep 2019 12:21) (14 - 18)  SpO2: 100% (03 Sep 2019 12:21) (99% - 100%)  Daily Height in cm: 157.48 (03 Sep 2019 05:10)    Daily     GENERAL:  Appears stated age, well-groomed, well-nourished, no distress  HEENT:  NC/AT,  conjunctivae clear and pink, no thyromegaly, nodules, adenopathy, no JVD, sclera -anicteric  CHEST:  Full & symmetric excursion, no increased effort, breath sounds clear  HEART:  Regular rhythm, S1, S2, no murmur/rub/S3/S4, no abdominal bruit, no edema  ABDOMEN:  Soft, non-tender, non-distended, normoactive bowel sounds,  no masses ,no hepato-splenomegaly, no signs of chronic liver disease  EXTEREMITIES:  no cyanosis,clubbing or edema  SKIN:  No rash/erythema/ecchymoses/petechiae/wounds/abscess/warm/dry  NEURO:  Alert, oriented, no asterixis, no tremor, no encephalopathy    LABS:                        10.5   10.3  )-----------( 381      ( 03 Sep 2019 13:34 )             35.1     09-03    138  |  104  |  17  ----------------------------<  93  4.7   |  22  |  1.45<H>    Ca    10.2      03 Sep 2019 05:58    TPro  7.7  /  Alb  4.5  /  TBili  0.4  /  DBili  x   /  AST  14  /  ALT  8<L>  /  AlkPhos  83  09-03    LIVER FUNCTIONS - ( 03 Sep 2019 05:58 )  Alb: 4.5 g/dL / Pro: 7.7 g/dL / ALK PHOS: 83 U/L / ALT: 8 U/L / AST: 14 U/L / GGT: x           PT/INR - ( 03 Sep 2019 12:13 )   PT: 12.1 sec;   INR: 1.06 ratio         PTT - ( 03 Sep 2019 12:13 )  PTT:137.0 sec        Imaging:

## 2019-09-03 NOTE — H&P ADULT - ATTENDING COMMENTS
on my exam, patient had reproducible chest tenderness.  he reported taking 4 s.l. nitroglycerine over the last day with zero improvement.  Morphine helps with pain.  Pain 5/10 when seen - and we tried 1 s.l. nitro without ANY benefit.  EKG without acute ischemic changes  troponin delta negative  at home, takes Tylenol for pain.  no SOB  lungs clear  no evidence of CHF  poor dentition  ex-drinker  financial barriers to optimal medical compliance.  gouty tophi both elbows    tylenol prn pain  continue med mgmt of CAD.  defer plan for heparin drip and possible angiogram to cardiology, but it seems like patient has non cardiac chest pain.  CKD III: avoid nephrotoxics. monitor intake and output.

## 2019-09-03 NOTE — H&P ADULT - NSICDXPASTMEDICALHX_GEN_ALL_CORE_FT
PAST MEDICAL HISTORY:  CAD (coronary artery disease)     Chronic gout of elbow, unspecified cause, unspecified laterality     HLD (hyperlipidemia)     HTN (hypertension)

## 2019-09-03 NOTE — CONSULT NOTE ADULT - SUBJECTIVE AND OBJECTIVE BOX
Patient seen and evaluated at bedside    Chief Complaint:    HPI:  65yo M w/ HTN, HLD, CAD s/p 4 stents, CABG (2015) p/w 1d of substernal chest pain. After his PCI in 5/2018, he was taking plavix until 6 months ago when he was treated for a peptic ulcer disease s/p EGD and started on pantoprazole. After two months, he stopped pantoprazole, but rebled and was continued on pantoprazole to good effect. Two days prior to admission, reports melena and dark emesis in addition to constant chest pain "heavy" in quality ranging from 5/10-10/10 that is worsened with exertion and present at rest. The pain radiates to his back. These symptoms led him to present to [facility]. He was transferred to [facility] for evaluation by cardiology and potential cath. He also endorses mild abdominal pain and shortness of breath with exertion that is not worse from baseline. He denies fever, chills, cough, or URI symptoms.    In ED: T97.8 HR 70 /85 RR 16 O2sat 99% RA. (03 Sep 2019 09:41)      PMHx:   Chronic gout of elbow, unspecified cause, unspecified laterality  HLD (hyperlipidemia)  CAD (coronary artery disease)  HTN (hypertension)      PSHx:   S/P CABG x 3  H/O heart artery stent      Allergies:  No Known Allergies      Home Meds:    Current Medications:   acetaminophen   Tablet .. 975 milliGRAM(s) Oral Once  acetaminophen   Tablet .. 650 milliGRAM(s) Oral every 6 hours PRN  atorvastatin 80 milliGRAM(s) Oral at bedtime  docusate sodium 100 milliGRAM(s) Oral three times a day PRN  heparin  Infusion.  Unit(s)/Hr IV Continuous <Continuous>  heparin  Injectable 3800 Unit(s) IV Push every 6 hours PRN  lisinopril 5 milliGRAM(s) Oral daily  metoprolol tartrate 12.5 milliGRAM(s) Oral two times a day  nitroglycerin     SubLingual 0.4 milliGRAM(s) SubLingual every 5 minutes PRN  ondansetron Injectable 4 milliGRAM(s) IV Push every 6 hours PRN      FAMILY HISTORY:  No pertinent family history in first degree relatives      Social History:  Smoking History:  Alcohol Use:  Drug Use:    REVIEW OF SYSTEMS:  Constitutional:     [ ] negative [ ] fevers [ ] chills [ ] weight loss [ ] weight gain  HEENT:                  [ ] negative [ ] dry eyes [ ] eye irritation [ ] postnasal drip [ ] nasal congestion  CV:                         [ ] negative  [ ] chest pain [ ] orthopnea [ ] palpitations [ ] murmur  Resp:                     [ ] negative [ ] cough [ ] shortness of breath [ ] dyspnea [ ] wheezing [ ] sputum [ ]hemoptysis  GI:                          [ ] negative [ ] nausea [ ] vomiting [ ] diarrhea [ ] constipation [ ] abd pain [ ] dysphagia   :                        [ ] negative [ ] dysuria [ ] nocturia [ ] hematuria [ ] increased urinary frequency  Musculoskeletal: [ ] negative [ ] back pain [ ] myalgias [ ] arthralgias [ ] fracture  Skin:                       [ ] negative [ ] rash [ ] itch  Neurological:        [ ] negative [ ] headache [ ] dizziness [ ] syncope [ ] weakness [ ] numbness  Psychiatric:           [ ] negative [ ] anxiety [ ] depression  Endocrine:            [ ] negative [ ] diabetes [ ] thyroid problem  Heme/Lymph:      [ ] negative [ ] anemia [ ] bleeding problem  Allergic/Immune: [ ] negative [ ] itchy eyes [ ] nasal discharge [ ] hives [ ] angioedema    [ ] All other systems negative  [ ] Unable to assess ROS due to      Physical Exam:  T(F): 98.4 (09-03), Max: 98.4 (09-03)  HR: 69 (09-03) (69 - 76)  BP: 120/65 (09-03) (120/65 - 136/79)  RR: 14 (09-03)  SpO2: 100% (09-03)  GENERAL: No acute distress, well-developed  HEAD:  Atraumatic, Normocephalic  ENT: EOMI, PERRLA, conjunctiva and sclera clear, Neck supple, No JVD, moist mucosa  CHEST/LUNG: Clear to auscultation bilaterally; No wheeze, equal breath sounds bilaterally   BACK: No spinal tenderness  HEART: Regular rate and rhythm; No murmurs, rubs, or gallops  ABDOMEN: Soft, Nontender, Nondistended; Bowel sounds present  EXTREMITIES:  No clubbing, cyanosis, or edema  PSYCH: Nl behavior, nl affect  NEUROLOGY: AAOx3, non-focal, cranial nerves intact  SKIN: Normal color, No rashes or lesions  LINES:    Cardiovascular Diagnostic Testing:    ECG: Personally reviewed:    Echo: Personally reviewed:    Stress Testing:    Cath:    Imaging:    CXR: Personally reviewed    Labs: Personally reviewed                        10.8   9.4   )-----------( 391      ( 03 Sep 2019 05:58 )             35.8     09-03    138  |  104  |  17  ----------------------------<  93  4.7   |  22  |  1.45<H>    Ca    10.2      03 Sep 2019 05:58    TPro  7.7  /  Alb  4.5  /  TBili  0.4  /  DBili  x   /  AST  14  /  ALT  8<L>  /  AlkPhos  83  09-03 Patient seen and evaluated at bedside    Chief Complaint: Chest pain    HPI:  63yo M w/ HTN, HLD, CAD s/p 4 stents, CABG (2015) p/w 1d of substernal chest pain. After his PCI in 5/2018, he was taking plavix until 6 months ago when he was treated for a peptic ulcer disease s/p EGD and started on pantoprazole. After two months, he stopped pantoprazole, but rebled and was continued on pantoprazole to good effect. Two days prior to admission, reports melena and dark emesis in addition to constant chest pain "heavy" in quality ranging from 5/10-10/10 that is worsened with exertion and present at rest. The pain radiates to his back. He was transferred to [facility] for evaluation by cardiology and potential cath. He also endorses mild abdominal pain and shortness of breath with exertion that is not worse from baseline. He denies fever, chills, cough, or URI symptoms.    These symptoms led him to present to [facility] where he received a heparin bolus followed by gtt, ASA, plavix, morphine 2 IV, nitrostat x2. He was transferred to [facility] for possible cath and cardiology consultation.    In NS ED: T97.8 HR 70 /85 RR 16 O2sat 99% RA. (03 Sep 2019 09:41). He received 1x nitrostat and heparin gtt was initially continued.    PMHx:   Chronic gout of elbow, unspecified cause, unspecified laterality  HLD (hyperlipidemia)  CAD (coronary artery disease)  HTN (hypertension)    PSHx:   S/P CABG x 3  H/O heart artery stent  He reports a procedure where they "removed water" from his head at [facility] 6 months ago.    Allergies:  No Known Allergies    Home Medications:  amLODIPine 5 mg oral tablet: 1 tab(s) orally once a day (03 Sep 2019 10:02)  atorvastatin 20 mg oral tablet: 1 tab(s) orally once a day (03 Sep 2019 10:02)  docusate sodium 100 mg oral capsule: 1 cap(s) orally 2 times a day, As Needed - for constipation (03 Sep 2019 10:02)  Ecotrin Adult Low Strength 81 mg oral delayed release tablet: 1 tab(s) orally once a day (03 Sep 2019 10:02)  lisinopril 5 mg oral tablet: 1 tab(s) orally once a day (03 Sep 2019 10:02)  Metoprolol Succinate ER 25 mg oral tablet, extended release: 1 tab(s) orally once a day (03 Sep 2019 10:02)  Nitrostat 0.4 mg sublingual tablet: 1 tab(s) sublingual every 5 minutes, As Needed (03 Sep 2019 10:02)  Pantoprazole 40 mg BID    Current Medications:   acetaminophen   Tablet .. 975 milliGRAM(s) Oral Once  acetaminophen   Tablet .. 650 milliGRAM(s) Oral every 6 hours PRN  atorvastatin 80 milliGRAM(s) Oral at bedtime  docusate sodium 100 milliGRAM(s) Oral three times a day PRN  heparin  Infusion.  Unit(s)/Hr IV Continuous <Continuous>  heparin  Injectable 3800 Unit(s) IV Push every 6 hours PRN  lisinopril 5 milliGRAM(s) Oral daily  metoprolol tartrate 12.5 milliGRAM(s) Oral two times a day  nitroglycerin     SubLingual 0.4 milliGRAM(s) SubLingual every 5 minutes PRN  ondansetron Injectable 4 milliGRAM(s) IV Push every 6 hours PRN      FAMILY HISTORY:  No pertinent family history in first degree relatives      Social History:  Smoking History: None  Alcohol Use: None  Lives at home with wife    REVIEW OF SYSTEMS:  Constitutional:     [x] negative [ ] fevers [ ] chills [ ] weight loss [ ] weight gain  HEENT:                  [x] negative [ ] dry eyes [ ] eye irritation [ ] postnasal drip [ ] nasal congestion  CV:                         [ ] negative  [x] chest pain [ ] orthopnea [ ] palpitations [ ] murmur  Resp:                     [ ] negative [ ] cough [x] shortness of breath [x] dyspnea [ ] wheezing [ ] sputum [ ]hemoptysis  GI:                          [ ] negative [ ] nausea [x] vomiting (dark emesis) [ ] diarrhea [ ] constipation [ ] abd pain [ ] dysphagia [x] melena   :                        [x] negative [ ] dysuria [ ] nocturia [ ] hematuria [ ] increased urinary frequency  Skin:                       [x] negative [ ] rash [ ] itch  Neurological:        [x] negative [ ] headache [ ] dizziness [ ] syncope [ ] weakness [ ] numbness  Psychiatric:           [x] negative [ ] anxiety [ ] depression  Endocrine:            [x] negative [ ] diabetes [ ] thyroid problem  Heme/Lymph:      [x] negative [ ] anemia [ ] bleeding problem  Allergic/Immune: [x] negative [ ] itchy eyes [ ] nasal discharge [ ] hives [ ] angioedema    Physical Exam:  T(F): 98.4 (09-03), Max: 98.4 (09-03)  HR: 69 (09-03) (69 - 76)  BP: 120/65 (09-03) (120/65 - 136/79)  RR: 14 (09-03)  SpO2: 100% (09-03)  GENERAL: No acute distress, well-developed  HEAD:  Atraumatic, Normocephalic  ENT: EOMI, PERRLA. No conjunctival pallor or icterus. Neck supple, No JVD, moist mucosa  CHEST/LUNG: Clear to auscultation bilaterally; No wheeze, equal breath sounds bilaterally   BACK: No spinal tenderness  HEART: Regular rate and rhythm; No murmurs, rubs, or gallops  ABDOMEN: Soft, Nontender, Nondistended; Mild epigastric tenderness.  EXTREMITIES:  No clubbing, cyanosis, or edema. Pronounced gouty tophi on upper extremities.  PSYCH: Nl behavior, nl affect  NEUROLOGY: AAOx3. Moving all four extremities.  SKIN: Normal color, No rashes or lesions  LINES: Bilateral antecubital PIV    Cardiovascular Diagnostic Testing:    ECG: Personally reviewed: NSR. Unchanged from prior.    Echo: Personally reviewed: 5/2018 Normal LV function. Mitral and aortic calcifications.    Cath:  5/2018  LM:   --  Distal left main: There was a discrete 30 % stenosis at a site with  no prior intervention. The lesion was concentric. There was HARINI grade 3 flow  through the vessel (brisk flow).  LAD:   --  Proximal LAD: There was a tubular 70 % stenosis at a site with no  prior intervention. The lesion was eccentric. There was HARINI grade 3 flow  through the vessel (brisk flow) and a large vascular territory distal to the  lesion. This is a likely culprit for the patient's clinical presentation. An  intervention was performed.iFR 0.88 --  Mid LAD: Mild Myocardial bridging was  present. There was a discrete 50 % stenosis at a site with no prior  intervention. The lesion was eccentric. There was HARINI grade 3 flow through the  vessel (brisk flow).  INTERVENTIONAL IMPRESSIONS: Successful PCI to severe stenosis of proximal LAD  using 3.5mm [place] LEE ANN Post PCI iFR was 0.92 (this is related to moderate left  main and moderate mid LAD stenosis)    Imaging: No new imaging    CXR: None.    Labs: Personally reviewed                        10.8   9.4   )-----------( 391      ( 03 Sep 2019 05:58 )             35.8     09-03    138  |  104  |  17  ----------------------------<  93  4.7   |  22  |  1.45<H>    Ca    10.2      03 Sep 2019 05:58    TPro  7.7  /  Alb  4.5  /  TBili  0.4  /  DBili  x   /  AST  14  /  ALT  8<L>  /  AlkPhos  83  09-03    Troponin 18 > 19 Patient seen and evaluated at bedside    Chief Complaint: Chest pain    HPI:  63yo M w/ HTN, HLD, CAD s/p 4 stents, CABG (2015) p/w 1d of substernal chest pain. After his PCI in 5/2018, he was taking plavix until 6 months ago when he was treated for a peptic ulcer disease s/p EGD and started on pantoprazole. After two months, he stopped pantoprazole, but rebled and was continued on pantoprazole to good effect. Two days prior to admission, reports melena and dark emesis in addition to constant chest pain "heavy" in quality ranging from 5/10-10/10 that is worsened with exertion and present at rest. The pain radiates to his back. He also endorses mild abdominal pain and shortness of breath with exertion that is not worse from baseline. He denies fever, chills, cough, or URI symptoms.    These symptoms led him to present to [facility] where he received a heparin bolus followed by gtt, ASA, plavix, morphine 2 IV, nitrostat x2. He was transferred to [facility] for possible cath and cardiology consultation.    In [facility] ED: T97.8 HR 70 /85 RR 16 O2sat 99% RA. (03 Sep 2019 09:41). He received 1x nitrostat and heparin gtt was initially continued.    PMHx:   Chronic gout of elbow, unspecified cause, unspecified laterality  HLD (hyperlipidemia)  CAD (coronary artery disease)  HTN (hypertension)    PSHx:   S/P CABG x 3  H/O heart artery stent  He reports a procedure where they "removed water" from his head at [facility] 6 months ago.    Allergies:  No Known Allergies    Home Medications:  amLODIPine 5 mg oral tablet: 1 tab(s) orally once a day (03 Sep 2019 10:02)  atorvastatin 20 mg oral tablet: 1 tab(s) orally once a day (03 Sep 2019 10:02)  docusate sodium 100 mg oral capsule: 1 cap(s) orally 2 times a day, As Needed - for constipation (03 Sep 2019 10:02)  Ecotrin Adult Low Strength 81 mg oral delayed release tablet: 1 tab(s) orally once a day (03 Sep 2019 10:02)  lisinopril 5 mg oral tablet: 1 tab(s) orally once a day (03 Sep 2019 10:02)  Metoprolol Succinate ER 25 mg oral tablet, extended release: 1 tab(s) orally once a day (03 Sep 2019 10:02)  Nitrostat 0.4 mg sublingual tablet: 1 tab(s) sublingual every 5 minutes, As Needed (03 Sep 2019 10:02)  Pantoprazole 40 mg BID    Current Medications:   acetaminophen   Tablet .. 975 milliGRAM(s) Oral Once  acetaminophen   Tablet .. 650 milliGRAM(s) Oral every 6 hours PRN  atorvastatin 80 milliGRAM(s) Oral at bedtime  docusate sodium 100 milliGRAM(s) Oral three times a day PRN  heparin  Infusion.  Unit(s)/Hr IV Continuous <Continuous>  heparin  Injectable 3800 Unit(s) IV Push every 6 hours PRN  lisinopril 5 milliGRAM(s) Oral daily  metoprolol tartrate 12.5 milliGRAM(s) Oral two times a day  nitroglycerin     SubLingual 0.4 milliGRAM(s) SubLingual every 5 minutes PRN  ondansetron Injectable 4 milliGRAM(s) IV Push every 6 hours PRN      FAMILY HISTORY:  No pertinent family history in first degree relatives      Social History:  Smoking History: None  Alcohol Use: None  Lives at home with wife    REVIEW OF SYSTEMS:  Constitutional:     [x] negative [ ] fevers [ ] chills [ ] weight loss [ ] weight gain  HEENT:                  [x] negative [ ] dry eyes [ ] eye irritation [ ] postnasal drip [ ] nasal congestion  CV:                         [ ] negative  [x] chest pain [ ] orthopnea [ ] palpitations [ ] murmur  Resp:                     [ ] negative [ ] cough [x] shortness of breath [x] dyspnea [ ] wheezing [ ] sputum [ ]hemoptysis  GI:                          [ ] negative [ ] nausea [x] vomiting (dark emesis) [ ] diarrhea [ ] constipation [ ] abd pain [ ] dysphagia [x] melena   :                        [x] negative [ ] dysuria [ ] nocturia [ ] hematuria [ ] increased urinary frequency  Skin:                       [x] negative [ ] rash [ ] itch  Neurological:        [x] negative [ ] headache [ ] dizziness [ ] syncope [ ] weakness [ ] numbness  Psychiatric:           [x] negative [ ] anxiety [ ] depression  Endocrine:            [x] negative [ ] diabetes [ ] thyroid problem  Heme/Lymph:      [x] negative [ ] anemia [ ] bleeding problem  Allergic/Immune: [x] negative [ ] itchy eyes [ ] nasal discharge [ ] hives [ ] angioedema    Physical Exam:  T(F): 98.4 (09-03), Max: 98.4 (09-03)  HR: 69 (09-03) (69 - 76)  BP: 120/65 (09-03) (120/65 - 136/79)  RR: 14 (09-03)  SpO2: 100% (09-03)  GENERAL: No acute distress, well-developed  HEAD:  Atraumatic, Normocephalic  ENT: EOMI, PERRLA. No conjunctival pallor or icterus. Neck supple, No JVD, moist mucosa  CHEST/LUNG: Clear to auscultation bilaterally; No wheeze, equal breath sounds bilaterally   BACK: No spinal tenderness  HEART: Regular rate and rhythm; No murmurs, rubs, or gallops  ABDOMEN: Soft, Nontender, Nondistended; Mild epigastric tenderness.  EXTREMITIES:  No clubbing, cyanosis, or edema. Pronounced gouty tophi on upper extremities.  PSYCH: Nl behavior, nl affect  NEUROLOGY: AAOx3. Moving all four extremities.  SKIN: Normal color, No rashes or lesions  LINES: Bilateral antecubital PIV    Cardiovascular Diagnostic Testing:    ECG: Personally reviewed: NSR. Unchanged from prior.    Echo: Personally reviewed: 5/2018 Normal LV function. Mitral and aortic calcifications.    Cath:  5/2018  LM:   --  Distal left main: There was a discrete 30 % stenosis at a site with  no prior intervention. The lesion was concentric. There was HARINI grade 3 flow  through the vessel (brisk flow).  LAD:   --  Proximal LAD: There was a tubular 70 % stenosis at a site with no  prior intervention. The lesion was eccentric. There was HARINI grade 3 flow  through the vessel (brisk flow) and a large vascular territory distal to the  lesion. This is a likely culprit for the patient's clinical presentation. An  intervention was performed.iFR 0.88 --  Mid LAD: Mild Myocardial bridging was  present. There was a discrete 50 % stenosis at a site with no prior  intervention. The lesion was eccentric. There was HARINI grade 3 flow through the  vessel (brisk flow).  INTERVENTIONAL IMPRESSIONS: Successful PCI to severe stenosis of proximal LAD  using 3.5mm [place] LEE ANN Post PCI iFR was 0.92 (this is related to moderate left  main and moderate mid LAD stenosis)    Imaging: No new imaging    CXR: None.    Labs: Personally reviewed                        10.8   9.4   )-----------( 391      ( 03 Sep 2019 05:58 )             35.8     09-03    138  |  104  |  17  ----------------------------<  93  4.7   |  22  |  1.45<H>    Ca    10.2      03 Sep 2019 05:58    TPro  7.7  /  Alb  4.5  /  TBili  0.4  /  DBili  x   /  AST  14  /  ALT  8<L>  /  AlkPhos  83  09-03    Troponin 18 > 19 Patient seen and evaluated at bedside    Chief Complaint: Chest pain    HPI:  63yo M w/ HTN, HLD, CAD s/p 4 stents, CABG (2015) p/w 2 days of substernal chest pain. After his PCI in 5/2018, he was taking plavix until 6 months ago when he was treated for a peptic ulcer disease s/p EGD and started on pantoprazole. After two months, he stopped pantoprazole, but rebled and was continued on pantoprazole to good effect. Two days prior to admission, reports melena and dark emesis in addition to constant chest pain "heavy" in quality ranging from 5/10-10/10 that is worsened with exertion and present at rest. The pain radiates to his back. He also endorses mild abdominal pain and shortness of breath with exertion that is not worse from baseline. He denies fever, chills, cough, or URI symptoms.    These symptoms led him to present to [facility] where he received a heparin bolus followed by gtt, ASA, plavix, morphine 2 IV, nitrostat x2. He was transferred to [facility] for possible cath and cardiology consultation.    In [facility] ED: T97.8 HR 70 /85 RR 16 O2sat 99% RA. (03 Sep 2019 09:41). He received 1x nitrostat and heparin gtt was initially continued.    PMHx:   Chronic gout of elbow, unspecified cause, unspecified laterality  HLD (hyperlipidemia)  CAD (coronary artery disease)  HTN (hypertension)    PSHx:   S/P CABG x 3  H/O heart artery stent  He reports a procedure where they "removed water" from his head at [facility] 6 months ago.    Allergies:  No Known Allergies    Home Medications:  amLODIPine 5 mg oral tablet: 1 tab(s) orally once a day (03 Sep 2019 10:02)  atorvastatin 20 mg oral tablet: 1 tab(s) orally once a day (03 Sep 2019 10:02)  docusate sodium 100 mg oral capsule: 1 cap(s) orally 2 times a day, As Needed - for constipation (03 Sep 2019 10:02)  Ecotrin Adult Low Strength 81 mg oral delayed release tablet: 1 tab(s) orally once a day (03 Sep 2019 10:02)  lisinopril 5 mg oral tablet: 1 tab(s) orally once a day (03 Sep 2019 10:02)  Metoprolol Succinate ER 25 mg oral tablet, extended release: 1 tab(s) orally once a day (03 Sep 2019 10:02)  Nitrostat 0.4 mg sublingual tablet: 1 tab(s) sublingual every 5 minutes, As Needed (03 Sep 2019 10:02)  Pantoprazole 40 mg BID    Current Medications:   acetaminophen   Tablet .. 975 milliGRAM(s) Oral Once  acetaminophen   Tablet .. 650 milliGRAM(s) Oral every 6 hours PRN  atorvastatin 80 milliGRAM(s) Oral at bedtime  docusate sodium 100 milliGRAM(s) Oral three times a day PRN  heparin  Infusion.  Unit(s)/Hr IV Continuous <Continuous>  heparin  Injectable 3800 Unit(s) IV Push every 6 hours PRN  lisinopril 5 milliGRAM(s) Oral daily  metoprolol tartrate 12.5 milliGRAM(s) Oral two times a day  nitroglycerin     SubLingual 0.4 milliGRAM(s) SubLingual every 5 minutes PRN  ondansetron Injectable 4 milliGRAM(s) IV Push every 6 hours PRN      FAMILY HISTORY:  No pertinent family history in first degree relatives      Social History:  Smoking History: None  Alcohol Use: None  Lives at home with wife    REVIEW OF SYSTEMS:  Constitutional:     [x] negative [ ] fevers [ ] chills [ ] weight loss [ ] weight gain  HEENT:                  [x] negative [ ] dry eyes [ ] eye irritation [ ] postnasal drip [ ] nasal congestion  CV:                         [ ] negative  [x] chest pain [ ] orthopnea [ ] palpitations [ ] murmur  Resp:                     [ ] negative [ ] cough [x] shortness of breath [x] dyspnea [ ] wheezing [ ] sputum [ ]hemoptysis  GI:                          [ ] negative [ ] nausea [x] vomiting (dark emesis) [ ] diarrhea [ ] constipation [ ] abd pain [ ] dysphagia [x] melena   :                        [x] negative [ ] dysuria [ ] nocturia [ ] hematuria [ ] increased urinary frequency  Skin:                       [x] negative [ ] rash [ ] itch  Neurological:        [x] negative [ ] headache [ ] dizziness [ ] syncope [ ] weakness [ ] numbness  Psychiatric:           [x] negative [ ] anxiety [ ] depression  Endocrine:            [x] negative [ ] diabetes [ ] thyroid problem  Heme/Lymph:      [x] negative [ ] anemia [ ] bleeding problem  Allergic/Immune: [x] negative [ ] itchy eyes [ ] nasal discharge [ ] hives [ ] angioedema    Physical Exam:  T(F): 98.4 (09-03), Max: 98.4 (09-03)  HR: 69 (09-03) (69 - 76)  BP: 120/65 (09-03) (120/65 - 136/79)  RR: 14 (09-03)  SpO2: 100% (09-03)  GENERAL: No acute distress, well-developed  HEAD:  Atraumatic, Normocephalic  ENT: EOMI, PERRLA. No conjunctival pallor or icterus. Neck supple, No JVD, moist mucosa  CHEST/LUNG: Clear to auscultation bilaterally; No wheeze, equal breath sounds bilaterally   BACK: No spinal tenderness  HEART: Regular rate and rhythm; No murmurs, rubs, or gallops  ABDOMEN: Soft, Nontender, Nondistended; Mild epigastric tenderness.  EXTREMITIES:  No clubbing, cyanosis, or edema. Pronounced gouty tophi on upper extremities.  PSYCH: Nl behavior, nl affect  NEUROLOGY: AAOx3. Moving all four extremities.  SKIN: Normal color, No rashes or lesions  LINES: Bilateral antecubital PIV    Cardiovascular Diagnostic Testing:    ECG: Personally reviewed: NSR. Unchanged from prior.    Echo: Personally reviewed: 5/2018 Normal LV function. Mitral and aortic calcifications.    Cath:  5/2018  LM:   --  Distal left main: There was a discrete 30 % stenosis at a site with  no prior intervention. The lesion was concentric. There was HARINI grade 3 flow  through the vessel (brisk flow).  LAD:   --  Proximal LAD: There was a tubular 70 % stenosis at a site with no  prior intervention. The lesion was eccentric. There was HARINI grade 3 flow  through the vessel (brisk flow) and a large vascular territory distal to the  lesion. This is a likely culprit for the patient's clinical presentation. An  intervention was performed.iFR 0.88 --  Mid LAD: Mild Myocardial bridging was  present. There was a discrete 50 % stenosis at a site with no prior  intervention. The lesion was eccentric. There was HARINI grade 3 flow through the  vessel (brisk flow).  INTERVENTIONAL IMPRESSIONS: Successful PCI to severe stenosis of proximal LAD  using 3.5mm [place] LEE ANN Post PCI iFR was 0.92 (this is related to moderate left  main and moderate mid LAD stenosis)    Imaging: No new imaging    CXR: None.    Labs: Personally reviewed                        10.8   9.4   )-----------( 391      ( 03 Sep 2019 05:58 )             35.8     09-03    138  |  104  |  17  ----------------------------<  93  4.7   |  22  |  1.45<H>    Ca    10.2      03 Sep 2019 05:58    TPro  7.7  /  Alb  4.5  /  TBili  0.4  /  DBili  x   /  AST  14  /  ALT  8<L>  /  AlkPhos  83  09-03    Troponin 18 > 19 Patient seen and evaluated at bedside    Chief Complaint: Chest pain    HPI:  63yo M w/ HTN, HLD, CAD s/p 4 stents, CABG (2015) p/w 2 days of substernal chest pain. After his PCI in 5/2018, he was taking plavix until 6 months ago when he was treated for a peptic ulcer disease s/p EGD and started on pantoprazole. After two months, he stopped pantoprazole, but rebled and was continued on pantoprazole to good effect. Two days prior to admission, reports melena and dark emesis in addition to constant chest pain "heavy" in quality ranging from 5/10-10/10 that is worsened with exertion and present at rest. The pain radiates to his back. He also endorses mild abdominal pain and shortness of breath with exertion that is not worse from baseline. He denies fever, chills, cough, or URI symptoms. The pain is pleuritic, worse with movement, and can be reproduced with palpation.    These symptoms led him to present to [facility] where he received a heparin bolus followed by gtt, ASA, plavix, morphine 2 IV, nitrostat x2. He was transferred to [facility] for possible cath and cardiology consultation.    In [facility] ED: T97.8 HR 70 /85 RR 16 O2sat 99% RA. (03 Sep 2019 09:41). He received 1x nitrostat and heparin gtt was initially continued.    PMHx:   Chronic gout of elbow, unspecified cause, unspecified laterality  HLD (hyperlipidemia)  CAD (coronary artery disease)  HTN (hypertension)    PSHx:   S/P CABG x 3  H/O heart artery stent  He reports a procedure where they "removed water" from his head at [facility] 6 months ago.    Allergies:  No Known Allergies    Home Medications:  amLODIPine 5 mg oral tablet: 1 tab(s) orally once a day (03 Sep 2019 10:02)  atorvastatin 20 mg oral tablet: 1 tab(s) orally once a day (03 Sep 2019 10:02)  docusate sodium 100 mg oral capsule: 1 cap(s) orally 2 times a day, As Needed - for constipation (03 Sep 2019 10:02)  Ecotrin Adult Low Strength 81 mg oral delayed release tablet: 1 tab(s) orally once a day (03 Sep 2019 10:02)  lisinopril 5 mg oral tablet: 1 tab(s) orally once a day (03 Sep 2019 10:02)  Metoprolol Succinate ER 25 mg oral tablet, extended release: 1 tab(s) orally once a day (03 Sep 2019 10:02)  Nitrostat 0.4 mg sublingual tablet: 1 tab(s) sublingual every 5 minutes, As Needed (03 Sep 2019 10:02)  Pantoprazole 40 mg BID    Current Medications:   acetaminophen   Tablet .. 975 milliGRAM(s) Oral Once  acetaminophen   Tablet .. 650 milliGRAM(s) Oral every 6 hours PRN  atorvastatin 80 milliGRAM(s) Oral at bedtime  docusate sodium 100 milliGRAM(s) Oral three times a day PRN  heparin  Infusion.  Unit(s)/Hr IV Continuous <Continuous>  heparin  Injectable 3800 Unit(s) IV Push every 6 hours PRN  lisinopril 5 milliGRAM(s) Oral daily  metoprolol tartrate 12.5 milliGRAM(s) Oral two times a day  nitroglycerin     SubLingual 0.4 milliGRAM(s) SubLingual every 5 minutes PRN  ondansetron Injectable 4 milliGRAM(s) IV Push every 6 hours PRN      FAMILY HISTORY:  No pertinent family history in first degree relatives      Social History:  Smoking History: None  Alcohol Use: None  Lives at home with wife    REVIEW OF SYSTEMS:  Constitutional:     [x] negative [ ] fevers [ ] chills [ ] weight loss [ ] weight gain  HEENT:                  [x] negative [ ] dry eyes [ ] eye irritation [ ] postnasal drip [ ] nasal congestion  CV:                         [ ] negative  [x] chest pain [ ] orthopnea [ ] palpitations [ ] murmur  Resp:                     [ ] negative [ ] cough [x] shortness of breath [x] dyspnea [ ] wheezing [ ] sputum [ ]hemoptysis  GI:                          [ ] negative [ ] nausea [x] vomiting (dark emesis) [ ] diarrhea [ ] constipation [ ] abd pain [ ] dysphagia [x] melena   :                        [x] negative [ ] dysuria [ ] nocturia [ ] hematuria [ ] increased urinary frequency  Skin:                       [x] negative [ ] rash [ ] itch  Neurological:        [x] negative [ ] headache [ ] dizziness [ ] syncope [ ] weakness [ ] numbness  Psychiatric:           [x] negative [ ] anxiety [ ] depression  Endocrine:            [x] negative [ ] diabetes [ ] thyroid problem  Heme/Lymph:      [x] negative [ ] anemia [ ] bleeding problem  Allergic/Immune: [x] negative [ ] itchy eyes [ ] nasal discharge [ ] hives [ ] angioedema    Physical Exam:  T(F): 98.4 (09-03), Max: 98.4 (09-03)  HR: 69 (09-03) (69 - 76)  BP: 120/65 (09-03) (120/65 - 136/79)  RR: 14 (09-03)  SpO2: 100% (09-03)  GENERAL: No acute distress, well-developed  HEAD:  Atraumatic, Normocephalic  ENT: EOMI, PERRLA. No conjunctival pallor or icterus. Neck supple, No JVD, moist mucosa  CHEST/LUNG: Clear to auscultation bilaterally; No wheeze, equal breath sounds bilaterally. Chest wall palpation reproduces pain.  BACK: Back pain reproducible with palpation.  HEART: Regular rate and rhythm; No murmurs, rubs, or gallops  ABDOMEN: Soft, Nontender, Nondistended; Mild epigastric tenderness.  EXTREMITIES:  No clubbing, cyanosis, or edema. Pronounced gouty tophi on upper extremities.  PSYCH: Nl behavior, nl affect  NEUROLOGY: AAOx3. Moving all four extremities.  SKIN: Normal color, No rashes or lesions  LINES: Bilateral antecubital PIV    Cardiovascular Diagnostic Testing:    ECG: Personally reviewed: NSR. Unchanged from prior.    Echo: Personally reviewed: 5/2018 Normal LV function. Mitral and aortic calcifications.    Cath:  5/2018  LM:   --  Distal left main: There was a discrete 30 % stenosis at a site with  no prior intervention. The lesion was concentric. There was HARINI grade 3 flow  through the vessel (brisk flow).  LAD:   --  Proximal LAD: There was a tubular 70 % stenosis at a site with no  prior intervention. The lesion was eccentric. There was HARINI grade 3 flow  through the vessel (brisk flow) and a large vascular territory distal to the  lesion. This is a likely culprit for the patient's clinical presentation. An  intervention was performed.iFR 0.88 --  Mid LAD: Mild Myocardial bridging was  present. There was a discrete 50 % stenosis at a site with no prior  intervention. The lesion was eccentric. There was HARINI grade 3 flow through the  vessel (brisk flow).  INTERVENTIONAL IMPRESSIONS: Successful PCI to severe stenosis of proximal LAD  using 3.5mm Giancarlo LEE ANN Post PCI iFR was 0.92 (this is related to moderate left  main and moderate mid LAD stenosis)    Imaging: No new imaging    CXR: None.    Labs: Personally reviewed                        10.8   9.4   )-----------( 391      ( 03 Sep 2019 05:58 )             35.8     09-03    138  |  104  |  17  ----------------------------<  93  4.7   |  22  |  1.45<H>    Ca    10.2      03 Sep 2019 05:58    TPro  7.7  /  Alb  4.5  /  TBili  0.4  /  DBili  x   /  AST  14  /  ALT  8<L>  /  AlkPhos  83  09-03    Troponin 18 > 19

## 2019-09-03 NOTE — PROVIDER CONTACT NOTE (CRITICAL VALUE NOTIFICATION) - ASSESSMENT
Pt A&Ox4, VSS. Pt c/o chest discomfort but is tolerating discomfort at the moment & does not want any pain medications. Pt currently resting in stretcher.

## 2019-09-03 NOTE — ED PROVIDER NOTE - OBJECTIVE STATEMENT
Ghazala Adrian MD: 63 yo M w/ PMHx of HTN, HLD, CAD, s/p CABG and stent, CKD who presents with substernal chest pain since yesterday as transfer from [facility] for cardiac evaluation. EKG nml. Troponin neg. Asa, Plavix, sublingual nitro, 2 morphine, Lipitor, heparin bolus and gtt started. Pain constant, pressure, radiating to L shoulder, mild relief with medications, not worse with exertion. Admits to nausea. No SOB, diaphoresis, vomit, syncope, lightheadedness, irregular rhythm, palpitations, leg swelling or focal neuro deficit.

## 2019-09-03 NOTE — H&P ADULT - NSHPLABSRESULTS_GEN_ALL_CORE
LABS:                          10.8   9.4   )-----------( 391      ( 03 Sep 2019 05:58 )             35.8       09-03    138  |  104  |  17  ----------------------------<  93  4.7   |  22  |  1.45<H>    Ca    10.2      03 Sep 2019 05:58    TPro  7.7  /  Alb  4.5  /  TBili  0.4  /  DBili  x   /  AST  14  /  ALT  8<L>  /  AlkPhos  83  09-03       LIVER FUNCTIONS - ( 03 Sep 2019 05:58 )  Alb: 4.5 g/dL / Pro: 7.7 g/dL / ALK PHOS: 83 U/L / ALT: 8 U/L / AST: 14 U/L / GGT: x                    RADIOLOGY & ADDITIONAL TESTS: Reviewed    < from: 12 Lead ECG (09.03.19 @ 05:57) >    Ventricular Rate 68 BPM    Atrial Rate 68 BPM    P-R Interval 150 ms    QRS Duration 86 ms    Q-T Interval 404 ms    QTC Calculation(Bezet) 429 ms    P Axis 23 degrees    R Axis 13 degrees    T Axis 45 degrees    Diagnosis Line NORMAL SINUS RHYTHM  NORMAL ECG    < end of copied text >

## 2019-09-03 NOTE — ED PROVIDER NOTE - NS ED ROS FT
General: denies fever, chills  HENT: denies nasal congestion, sore throat, rhinorrhea  Eyes: denies vision changes  CV: +chest pain  Resp: denies difficulty breathing, cough  Abdominal: +nausea, vomiting, diarrhea, abdominal pain, blood in stool, dark stool  : denies pain with urination  MSK: denies recent trauma  Neuro: denies headaches, numbness, tingling, dizziness, lightheadedness.  Skin: denies new rashes  Endocrine: denies recent weight loss

## 2019-09-03 NOTE — H&P ADULT - NSHPREVIEWOFSYSTEMS_GEN_ALL_CORE
General: No fevers, chills, fatigue  HEENT: No headaches, rhinorrhea, sore throat  CVS: + chest pain; palpitations  Resp: No cough, dyspnea, wheezing  GI: No abdominal pain, nausea, constipation, diarrhea  : No dysuria, frequency, hematuria, or discharge  MSK: No joint pain or swelling  Ext: No edema, no claudication  Skin: No rashes or itching  Heme: No easy bruising or petechiae  Neuro: No confusion, numbness, weakness, or loss of consciousness  Endocrine: No excessive heat or cold symptoms, polyuria, polydipsia

## 2019-09-03 NOTE — H&P ADULT - PROBLEM SELECTOR PLAN 1
1day of intermittent non-radiating dull substernal cp now improved. HD stable.  - trop negative x3  - EKG NSR without ischemic changes  - s/p hep bolus and gtt, asa, plavix, SLN, and morphine at OSH  - c/w hep gtt, asa, statin, home ACE/BB  - tylenol, SLN PRN  - accepted by cardiology for cath eval  - f/u cards recs; patient not on plavix at home given history of hematemesis  - check TSH, lipid profile, a1c 1day of intermittent non-radiating dull substernal cp now improved. HD stable.  - trop negative x3  - EKG NSR without ischemic changes  - s/p hep bolus and gtt, asa, plavix, SLN, and morphine at OSH  - c/w hep gtt, asa, statin, home ACE/BB  - tylenol, SLN PRN  - accepted by cardiology for cath eval  - f/u cards recs; patient not on plavix at home given history of hematemesis  - check TSH, lipid profile, a1c  - check CXR  - check TTE 1day of intermittent non-radiating dull substernal cp now improved. HD stable. Pain reproducible with palpation. DDx includes epigastric pain from PUD given minimal improvement with nitro, and anemia with report of recent melena.  - trop negative x3  - EKG NSR without ischemic changes  - s/p hep bolus and gtt, asa, plavix, SLN, and morphine at OSH  - will dc hep gtt per discussion with cards  - hold plavix in setting of anemia with report of recent melena  - c/w asa, statin, home ACE/BB  - tylenol, SLN PRN  - no plan for cath at this time per cardiology, pending GI eval  - f/u cards recs  - check TSH, lipid profile, a1c  - check CXR  - check TTE

## 2019-09-03 NOTE — ED ADULT NURSE REASSESSMENT NOTE - NS ED NURSE REASSESS COMMENT FT1
Rec'd report. Pt A/O x3, NAD, denies chest pain/sob/palpitations. Heparin 25,000u/250cc D5W infusing at 7.5 cc/hr sans problems. PT/PTT/INR due at 10:45am.

## 2019-09-03 NOTE — H&P ADULT - PROBLEM SELECTOR PLAN 3
- c/w lipitor 80 for now BP stable. On amlodipine 5, lisinopril 5, and toprol 25 at home.  - c/w lisinopril 5 and lopressor 12.5 BID for now and titrate regimen as tolerated

## 2019-09-03 NOTE — H&P ADULT - NSHPPHYSICALEXAM_GEN_ALL_CORE
Physical Exam:  Gen: Alert, well-developed, NAD  HEENT: NCAT, PERRL, EOMI, clear conjunctiva, no scleral icterus, mmm  Neck: Supple, no JVD, no LAD  CV: RRR, S1S2, no m/r/g  Resp: CTAB, normal respiratory effort on RA  Abd: Soft, NT, ND, normal bowel sounds  Ext: + peripheral pulses, no clubbing, cyanosis or edema  Neuro: AOx3, CN2-12 grossly intact, BAKER  Skin: no rashes

## 2019-09-03 NOTE — H&P ADULT - PROBLEM SELECTOR PLAN 2
BP stable. On amlodipine 5, lisinopril 5, and toprol 25 at home.  - c/w lisinopril 5 and lopressor 12.5 BID for now BP stable. On amlodipine 5, lisinopril 5, and toprol 25 at home.  - c/w lisinopril 5 and lopressor 12.5 BID for now and titrate regimen as tolerated Microcytic anemia with Hb 10.8 from baseline in 12s in setting of epigastric pain with report of recent melena in patient with history of heavy prior etoh use.  - no signs of active bleed on current admission  - monitor CBC, maintain active T&S  - GI c/s placed with Dr. Wadsworth  - check retic and iron studies

## 2019-09-04 ENCOUNTER — TRANSCRIPTION ENCOUNTER (OUTPATIENT)
Age: 64
End: 2019-09-04

## 2019-09-04 VITALS
DIASTOLIC BLOOD PRESSURE: 72 MMHG | HEART RATE: 80 BPM | TEMPERATURE: 98 F | SYSTOLIC BLOOD PRESSURE: 111 MMHG | OXYGEN SATURATION: 100 % | RESPIRATION RATE: 18 BRPM

## 2019-09-04 DIAGNOSIS — R07.9 CHEST PAIN, UNSPECIFIED: ICD-10-CM

## 2019-09-04 LAB
CHOLEST SERPL-MCNC: 139 MG/DL — SIGNIFICANT CHANGE UP (ref 10–199)
FERRITIN SERPL-MCNC: 14 NG/ML — LOW (ref 30–400)
FOLATE SERPL-MCNC: >20 NG/ML — SIGNIFICANT CHANGE UP
HBA1C BLD-MCNC: 6 % — HIGH (ref 4–5.6)
HCT VFR BLD CALC: 33.9 % — LOW (ref 39–50)
HCV AB S/CO SERPL IA: 0.07 S/CO — SIGNIFICANT CHANGE UP (ref 0–0.99)
HCV AB SERPL-IMP: SIGNIFICANT CHANGE UP
HDLC SERPL-MCNC: 28 MG/DL — LOW
HGB BLD-MCNC: 10.1 G/DL — LOW (ref 13–17)
IRON SATN MFR SERPL: 20 UG/DL — LOW (ref 45–165)
IRON SATN MFR SERPL: 5 % — LOW (ref 16–55)
LIPID PNL WITH DIRECT LDL SERPL: 69 MG/DL — SIGNIFICANT CHANGE UP
MCHC RBC-ENTMCNC: 22.6 PG — LOW (ref 27–34)
MCHC RBC-ENTMCNC: 29.8 GM/DL — LOW (ref 32–36)
MCV RBC AUTO: 75.8 FL — LOW (ref 80–100)
PLATELET # BLD AUTO: 383 K/UL — SIGNIFICANT CHANGE UP (ref 150–400)
RBC # BLD: 4.47 M/UL — SIGNIFICANT CHANGE UP (ref 4.2–5.8)
RBC # BLD: 4.47 M/UL — SIGNIFICANT CHANGE UP (ref 4.2–5.8)
RBC # FLD: 17.7 % — HIGH (ref 10.3–14.5)
RETICS #: 97 K/UL — SIGNIFICANT CHANGE UP (ref 25–125)
RETICS/RBC NFR: 2.2 % — SIGNIFICANT CHANGE UP (ref 0.5–2.5)
TIBC SERPL-MCNC: 427 UG/DL — SIGNIFICANT CHANGE UP (ref 220–430)
TOTAL CHOLESTEROL/HDL RATIO MEASUREMENT: 5 RATIO — SIGNIFICANT CHANGE UP (ref 3.4–9.6)
TRANSFERRIN SERPL-MCNC: 327 MG/DL — SIGNIFICANT CHANGE UP (ref 200–360)
TRIGL SERPL-MCNC: 209 MG/DL — HIGH (ref 10–149)
TROPONIN T, HIGH SENSITIVITY RESULT: 13 NG/L — SIGNIFICANT CHANGE UP (ref 0–51)
TSH SERPL-MCNC: 1.85 UIU/ML — SIGNIFICANT CHANGE UP (ref 0.27–4.2)
UIBC SERPL-MCNC: 407 UG/DL — HIGH (ref 110–370)
VIT B12 SERPL-MCNC: 1154 PG/ML — SIGNIFICANT CHANGE UP (ref 232–1245)
WBC # BLD: 8.61 K/UL — SIGNIFICANT CHANGE UP (ref 3.8–10.5)
WBC # FLD AUTO: 8.61 K/UL — SIGNIFICANT CHANGE UP (ref 3.8–10.5)

## 2019-09-04 PROCEDURE — 86901 BLOOD TYPING SEROLOGIC RH(D): CPT

## 2019-09-04 PROCEDURE — 85045 AUTOMATED RETICULOCYTE COUNT: CPT

## 2019-09-04 PROCEDURE — 96374 THER/PROPH/DIAG INJ IV PUSH: CPT

## 2019-09-04 PROCEDURE — 80053 COMPREHEN METABOLIC PANEL: CPT

## 2019-09-04 PROCEDURE — 85027 COMPLETE CBC AUTOMATED: CPT

## 2019-09-04 PROCEDURE — 83550 IRON BINDING TEST: CPT

## 2019-09-04 PROCEDURE — 93005 ELECTROCARDIOGRAM TRACING: CPT

## 2019-09-04 PROCEDURE — 99239 HOSP IP/OBS DSCHRG MGMT >30: CPT

## 2019-09-04 PROCEDURE — 80061 LIPID PANEL: CPT

## 2019-09-04 PROCEDURE — 86850 RBC ANTIBODY SCREEN: CPT

## 2019-09-04 PROCEDURE — 82607 VITAMIN B-12: CPT

## 2019-09-04 PROCEDURE — 90686 IIV4 VACC NO PRSV 0.5 ML IM: CPT

## 2019-09-04 PROCEDURE — 84484 ASSAY OF TROPONIN QUANT: CPT

## 2019-09-04 PROCEDURE — 83036 HEMOGLOBIN GLYCOSYLATED A1C: CPT

## 2019-09-04 PROCEDURE — 99285 EMERGENCY DEPT VISIT HI MDM: CPT | Mod: 25

## 2019-09-04 PROCEDURE — 85610 PROTHROMBIN TIME: CPT

## 2019-09-04 PROCEDURE — 84466 ASSAY OF TRANSFERRIN: CPT

## 2019-09-04 PROCEDURE — 82746 ASSAY OF FOLIC ACID SERUM: CPT

## 2019-09-04 PROCEDURE — 86900 BLOOD TYPING SEROLOGIC ABO: CPT

## 2019-09-04 PROCEDURE — 84443 ASSAY THYROID STIM HORMONE: CPT

## 2019-09-04 PROCEDURE — 83540 ASSAY OF IRON: CPT

## 2019-09-04 PROCEDURE — 85730 THROMBOPLASTIN TIME PARTIAL: CPT

## 2019-09-04 PROCEDURE — 82728 ASSAY OF FERRITIN: CPT

## 2019-09-04 PROCEDURE — 93010 ELECTROCARDIOGRAM REPORT: CPT

## 2019-09-04 PROCEDURE — 71045 X-RAY EXAM CHEST 1 VIEW: CPT

## 2019-09-04 PROCEDURE — 86803 HEPATITIS C AB TEST: CPT

## 2019-09-04 RX ORDER — CLOPIDOGREL BISULFATE 75 MG/1
1 TABLET, FILM COATED ORAL
Qty: 0 | Refills: 0 | DISCHARGE

## 2019-09-04 RX ORDER — METOPROLOL TARTRATE 50 MG
25 TABLET ORAL DAILY
Refills: 0 | Status: DISCONTINUED | OUTPATIENT
Start: 2019-09-05 | End: 2019-09-04

## 2019-09-04 RX ORDER — ATORVASTATIN CALCIUM 80 MG/1
40 TABLET, FILM COATED ORAL AT BEDTIME
Refills: 0 | Status: DISCONTINUED | OUTPATIENT
Start: 2019-09-04 | End: 2019-09-04

## 2019-09-04 RX ORDER — SUCRALFATE 1 G
10 TABLET ORAL
Qty: 120 | Refills: 0
Start: 2019-09-04 | End: 2019-10-03

## 2019-09-04 RX ORDER — PANTOPRAZOLE SODIUM 20 MG/1
1 TABLET, DELAYED RELEASE ORAL
Qty: 30 | Refills: 0
Start: 2019-09-04 | End: 2019-10-03

## 2019-09-04 RX ORDER — CLOPIDOGREL BISULFATE 75 MG/1
1 TABLET, FILM COATED ORAL
Qty: 30 | Refills: 0
Start: 2019-09-04 | End: 2019-10-03

## 2019-09-04 RX ADMIN — PANTOPRAZOLE SODIUM 40 MILLIGRAM(S): 20 TABLET, DELAYED RELEASE ORAL at 05:56

## 2019-09-04 RX ADMIN — INFLUENZA VIRUS VACCINE 0.5 MILLILITER(S): 15; 15; 15; 15 SUSPENSION INTRAMUSCULAR at 16:25

## 2019-09-04 RX ADMIN — Medication 1 GRAM(S): at 12:02

## 2019-09-04 RX ADMIN — Medication 1 GRAM(S): at 05:56

## 2019-09-04 RX ADMIN — LISINOPRIL 5 MILLIGRAM(S): 2.5 TABLET ORAL at 05:56

## 2019-09-04 RX ADMIN — Medication 12.5 MILLIGRAM(S): at 05:56

## 2019-09-04 RX ADMIN — Medication 81 MILLIGRAM(S): at 12:02

## 2019-09-04 RX ADMIN — Medication 100 MILLIGRAM(S): at 05:56

## 2019-09-04 NOTE — PROGRESS NOTE ADULT - SUBJECTIVE AND OBJECTIVE BOX
Mohsin Khan, MD  Attending Physician, Division Of Hospital Medicine  Pager: [phone], Office: [phone]    Patient is a 64y old  Male who presents with a chief complaint of Unstable angina    SUBJECTIVE / OVERNIGHT EVENTS:  Resting in bed, no c/o chest pain, N/V, sweat, SOB. Has mild acid reflux  Tele- SR, controlled rate, -138      MEDICATIONS  (STANDING):  aspirin enteric coated 81 milliGRAM(s) Oral daily  atorvastatin 80 milliGRAM(s) Oral at bedtime  influenza   Vaccine 0.5 milliLiter(s) IntraMuscular once  lisinopril 5 milliGRAM(s) Oral daily  metoprolol tartrate 12.5 milliGRAM(s) Oral two times a day  pantoprazole  Injectable 40 milliGRAM(s) IV Push two times a day  sucralfate suspension 1 Gram(s) Oral four times a day    MEDICATIONS  (PRN):  acetaminophen   Tablet .. 650 milliGRAM(s) Oral every 6 hours PRN Temp greater or equal to 38C (100.4F), Mild Pain (1 - 3)  docusate sodium 100 milliGRAM(s) Oral three times a day PRN for constipation  nitroglycerin     SubLingual 0.4 milliGRAM(s) SubLingual every 5 minutes PRN Chest Pain  ondansetron Injectable 4 milliGRAM(s) IV Push every 6 hours PRN Nausea      Vital Signs Last 24 Hrs  T(C): 36.5 (04 Sep 2019 04:36), Max: 36.8 (03 Sep 2019 17:52)  T(F): 97.7 (04 Sep 2019 04:36), Max: 98.2 (03 Sep 2019 17:52)  HR: 78 (04 Sep 2019 04:36) (69 - 94)  BP: 101/67 (04 Sep 2019 04:36) (101/67 - 138/90)  BP(mean): --  RR: 18 (04 Sep 2019 04:36) (14 - 20)  SpO2: 98% (04 Sep 2019 04:36) (98% - 100%)  CAPILLARY BLOOD GLUCOSE        I&O's Summary    03 Sep 2019 07:01  -  04 Sep 2019 07:00  --------------------------------------------------------  IN: 480 mL / OUT: 0 mL / NET: 480 mL        PHYSICAL EXAM:-  GENERAL: NAD, well-developed  EYES: EOMI, PERRLA, conjunctiva and sclera clear  NECK: Supple, No JVD, no thyromegaly  CHEST/LUNG: Clear to auscultation bilaterally; No wheeze  HEART: Regular rate and rhythm; S1, S2 audible, No murmurs, rubs, or gallops  ABDOMEN: Soft, Nontender, Nondistended; Bowel sounds present  EXTREMITIES:  2+ Peripheral Pulses, No clubbing, cyanosis, or edema  NEURO: AAOx3, no focal deficit      LABS:                        10.1   8.61  )-----------( 383      ( 04 Sep 2019 08:58 )             33.9     09-03    138  |  104  |  17  ----------------------------<  93  4.7   |  22  |  1.45<H>    Ca    10.2      03 Sep 2019 05:58    TPro  7.7  /  Alb  4.5  /  TBili  0.4  /  DBili  x   /  AST  14  /  ALT  8<L>  /  AlkPhos  83  09-03    PT/INR - ( 03 Sep 2019 12:13 )   PT: 12.1 sec;   INR: 1.06 ratio      PTT - ( 03 Sep 2019 12:13 )  PTT:137.0 sec    RADIOLOGY & ADDITIONAL TESTS:    Imaging Personally Reviewed: CXT  Consultant(s) Notes Reviewed:  Cardiology, GI  Care Discussed with Consultants/Other Providers: cardiology, GI

## 2019-09-04 NOTE — DISCHARGE NOTE NURSING/CASE MANAGEMENT/SOCIAL WORK - PATIENT PORTAL LINK FT
You can access the FollowMyHealth Patient Portal offered by [facility] by registering at the following website: [email or web address]. By joining [facility]’s FollowMyHealth portal, you will also be able to view your health information using other applications (apps) compatible with our system.

## 2019-09-04 NOTE — DISCHARGE NOTE PROVIDER - CARE PROVIDER_API CALL
Gabriel Wadsworth (DO)  Gastroenterology; Internal Medicine  [address] [place] [id number]  Phone: [phone]  Fax: [phone]  Follow Up Time:

## 2019-09-04 NOTE — DISCHARGE NOTE PROVIDER - HOSPITAL COURSE
63yo M w/ HTN, HLD, CAD s/p 4 stents, CABG (2015) p/w 1d of intermittent substernal chest pain at rest with vomiting transferred from OSH for possible cardiac cath for the chest pain. cardiology and GI evaluated for anemia.  Cont proton pump inhibitor, cont carafate, given stable hgb no need for inpatient  upper gastrointestinal endoscopy.  if no plan for further cardiac workup then dc planning with outpatient follow up in GI clinic.  D/C to home. This is a 64 M with h/o HTN, HLD, CAD s/p 4 stents, CABG (2015) p/w 1d of intermittent substernal chest pain at rest with vomiting transferred from OSH for possible cardiac cath. He remained hemodynamically stable and was admitted in Tele. monitoring. The patient was found to be anemia also. The cardiology and GI evaluated and planned as below..         Problem/Plan - 1:    ·  Problem: Chest pain.  Plan: Patient denies chest pain since admission. No SOB. EKG- NSR, no ischemic changes, Ruslan are normal. Reviewed Cath in 2018, Echo- normal EF. Possible GI source for atypical chest pain    - Cardiology evaluated and recommended no plan for cath or stress test    - c/w Home meds- Plavix, Statin, Metoprolol at home doses         Problem/Plan - 2:    ·  Problem: Anemia.  Plan: Microcytic anemia with Hb 10.8 from baseline in 12s in setting of epigastric pain. No c/o blood in stool or black stool.. Stable Hb so far    - GI evaluated, No plan for EGD    - advised to c/w Carafate and PPI, outpatient f/u in GI office in 1-2 weeks.          Problem/Plan - 3:    ·  Problem: HTN (hypertension).  Plan: BP stable.     - c/w home meds- amlodipine 5, lisinopril 5, and Toprol XL 25 at home doses        The patient remained hemodynamically stable and was discharged with instructions to contunue the current medicines and follow up with cardiology, GI outpatient for further care.

## 2019-09-04 NOTE — DISCHARGE NOTE PROVIDER - NSDCFUSCHEDAPPT_GEN_ALL_CORE_FT
MIR ALVARADO ; 10/04/2019 ; [facility] [address] [place] [address] MIR ALVARADO ; 10/04/2019 ; [facility] [address] [place] [address]

## 2019-09-04 NOTE — PROGRESS NOTE ADULT - PROBLEM SELECTOR PLAN 2
Microcytic anemia with Hb 10.8 from baseline in 12s in setting of epigastric pain.  no c/o blood in stool or black stool.. Stable Hb so far  - GI evaluated, No plan for EGD  - c/w Carafate and PPI, outpatient f/u in GI office

## 2019-09-04 NOTE — PROGRESS NOTE ADULT - ASSESSMENT
65yo M w/ HTN, HLD, CAD s/p 4 stents, CABG (2015) p/w 1d of intermittent substernal chest pain at rest with vomiting transferred from OSH for possible cardiac cath for the chest pain. cardiology and GI evaluated.

## 2019-09-04 NOTE — PROGRESS NOTE ADULT - SUBJECTIVE AND OBJECTIVE BOX
[facility]  Yao Jimenez PA-C  [address] [place] [address]  [phone]      INTERVAL HPI/OVERNIGHT EVENTS:    still with some chest pain  denies abdominal pain  hgb stable    MEDICATIONS  (STANDING):  aspirin enteric coated 81 milliGRAM(s) Oral daily  atorvastatin 80 milliGRAM(s) Oral at bedtime  influenza   Vaccine 0.5 milliLiter(s) IntraMuscular once  lisinopril 5 milliGRAM(s) Oral daily  metoprolol tartrate 12.5 milliGRAM(s) Oral two times a day  pantoprazole  Injectable 40 milliGRAM(s) IV Push two times a day  sucralfate suspension 1 Gram(s) Oral four times a day    MEDICATIONS  (PRN):  acetaminophen   Tablet .. 650 milliGRAM(s) Oral every 6 hours PRN Temp greater or equal to 38C (100.4F), Mild Pain (1 - 3)  docusate sodium 100 milliGRAM(s) Oral three times a day PRN for constipation  nitroglycerin     SubLingual 0.4 milliGRAM(s) SubLingual every 5 minutes PRN Chest Pain  ondansetron Injectable 4 milliGRAM(s) IV Push every 6 hours PRN Nausea      Allergies    No Known Allergies    Intolerances        ROS:   General:  No wt loss, fevers, chills, night sweats, fatigue,   Eyes:  Good vision, no reported pain  ENT:  No sore throat, pain, runny nose, dysphagia  CV:  No pain, palpitations, hypo/hypertension  Resp:  No dyspnea, cough, tachypnea, wheezing  GI:  No pain, No nausea, No vomiting, No diarrhea, No constipation, No weight loss, No fever, No pruritis, No rectal bleeding, No tarry stools, No dysphagia,  :  No pain, bleeding, incontinence, nocturia  Muscle:  No pain, weakness  Neuro:  No weakness, tingling, memory problems  Psych:  No fatigue, insomnia, mood problems, depression  Endocrine:  No polyuria, polydipsia, cold/heat intolerance  Heme:  No petechiae, ecchymosis, easy bruisability  Skin:  No rash, tattoos, scars, edema      PHYSICAL EXAM:   Vital Signs:  Vital Signs Last 24 Hrs  T(C): 36.5 (04 Sep 2019 04:36), Max: 36.8 (03 Sep 2019 17:52)  T(F): 97.7 (04 Sep 2019 04:36), Max: 98.2 (03 Sep 2019 17:52)  HR: 78 (04 Sep 2019 04:36) (69 - 94)  BP: 101/67 (04 Sep 2019 04:36) (101/67 - 138/90)  BP(mean): --  RR: 18 (04 Sep 2019 04:36) (14 - 20)  SpO2: 98% (04 Sep 2019 04:36) (98% - 100%)  Daily     Daily     GENERAL:  Appears stated age, well-groomed, well-nourished, no distress  HEENT:  NC/AT,  conjunctivae clear and pink, no thyromegaly, nodules, adenopathy, no JVD, sclera -anicteric  CHEST:  Full & symmetric excursion, no increased effort, breath sounds clear  HEART:  Regular rhythm, S1, S2, no murmur/rub/S3/S4, no abdominal bruit, no edema  ABDOMEN:  Soft, non-tender, non-distended, normoactive bowel sounds,  no masses ,no hepato-splenomegaly, no signs of chronic liver disease  EXTEREMITIES:  no cyanosis,clubbing or edema  SKIN:  No rash/erythema/ecchymoses/petechiae/wounds/abscess/warm/dry  NEURO:  Alert, oriented, no asterixis, no tremor, no encephalopathy      LABS:                        10.1   8.61  )-----------( 383      ( 04 Sep 2019 08:58 )             33.9     09-03    138  |  104  |  17  ----------------------------<  93  4.7   |  22  |  1.45<H>    Ca    10.2      03 Sep 2019 05:58    TPro  7.7  /  Alb  4.5  /  TBili  0.4  /  DBili  x   /  AST  14  /  ALT  8<L>  /  AlkPhos  83  09-03    PT/INR - ( 03 Sep 2019 12:13 )   PT: 12.1 sec;   INR: 1.06 ratio         PTT - ( 03 Sep 2019 12:13 )  PTT:137.0 sec      RADIOLOGY & ADDITIONAL TESTS:

## 2019-09-04 NOTE — PROGRESS NOTE ADULT - PROBLEM SELECTOR PLAN 1
advance to regular diet  cont proton pump inhibitor  cont carafate  given stable hgb no need for inpatient  upper gastrointestinal endoscopy  if no plan for further cardiac workup then dc planning with outpatient follow up in GI clinic at [phone]
Patient feels better, no chest pain since admission. No SOB. EKG- NSR, no ischemic changes, Ruslan are normal. Reviewed Cath in 2018, Echo- normal EF. Possible GI source for atypical chest pain  - Cardiology consult appreciated. No plan for cath or stress test  - c/w Home meds- Plavix, Statin, Metoprolol at home doses  - will d/c him if cardiology attending clears

## 2019-09-04 NOTE — DISCHARGE NOTE PROVIDER - NSDCCPCAREPLAN_GEN_ALL_CORE_FT
PRINCIPAL DISCHARGE DIAGNOSIS  Diagnosis: Unstable angina  Assessment and Plan of Treatment:   HOME CARE INSTRUCTIONS  For the next few days, avoid physical activities that bring on chest pain. Continue physical activities as directed.  Do not Informed pt of the various negative side effects of smoking including risk of COPD, Lung Ca etc  Strongly recommended that pt stops smoking and pt given various options of smoking cessasion tools such as NRT's and other pharmacotherapies.  Avoid drinking alcohol.   Only take over-the-counter or prescription medicine for pain, discomfort, or fever as directed by your caregiver.  Follow your caregiver's suggestions for further testing if your chest pain does not go away.  Keep any follow-up appointments you made. If you do not go to an appointment, you could develop lasting (chronic) problems with pain. If there is any problem keeping an appointment, you must call to reschedule.   SEEK MEDICAL CARE IF:  You think you are having problems from the medicine you are taking. Read your medicine instructions carefully.  Your chest pain does not go away, even after treatment.  You develop a rash with blisters on your chest.  SEEK IMMEDIATE MEDICAL CARE IF:  You have increased chest pain or pain that spreads to your arm, neck, jaw, back, or abdomen.   You develop shortness of breath, an increasing cough, or you are coughing up blood.  You have severe back or abdominal pain, feel nauseous, or vomit.  You develop severe weakness, fainting, or chills.  You have a fever.  THIS IS AN EMERGENCY. Do not wait to see if the pain will go away. Get medical help at once. Call your local emergency services. Do not drive yourself to the hospital.      SECONDARY DISCHARGE DIAGNOSES  Diagnosis: Anemia  Assessment and Plan of Treatment: Continue iron supplements  Eat iron and protein rich foods including: meat, dark leafy green vegetables, and beans.  Limit caffeine.  Notify your doctor if you experience heartburn, constipation, diarrhea, nausea/vomiting, dizziness or are feeling extremely tired.  Seek immediate care or call 911 if you experience:  shortness of breath even at rest, you have blood in your bowel movement or vomit, if you are too dizzy to stand up

## 2019-09-04 NOTE — PROGRESS NOTE ADULT - ATTENDING COMMENTS
Spoke to Liyah Castaneda over the phone Spoke to Liyah Castaneda over the phone  discharge time- 37 min

## 2019-10-04 ENCOUNTER — APPOINTMENT (OUTPATIENT)
Dept: NEUROSURGERY | Facility: CLINIC | Age: 64
End: 2019-10-04
Payer: MEDICAID

## 2019-10-04 VITALS
HEIGHT: 62 IN | DIASTOLIC BLOOD PRESSURE: 75 MMHG | RESPIRATION RATE: 18 BRPM | SYSTOLIC BLOOD PRESSURE: 125 MMHG | OXYGEN SATURATION: 98 % | HEART RATE: 71 BPM | WEIGHT: 140 LBS | TEMPERATURE: 98.2 F | BODY MASS INDEX: 25.76 KG/M2

## 2019-10-04 DIAGNOSIS — R51 HEADACHE: ICD-10-CM

## 2019-10-04 DIAGNOSIS — G93.89 OTHER SPECIFIED DISORDERS OF BRAIN: ICD-10-CM

## 2019-10-04 DIAGNOSIS — Z09 ENCOUNTER FOR FOLLOW-UP EXAMINATION AFTER COMPLETED TREATMENT FOR CONDITIONS OTHER THAN MALIGNANT NEOPLASM: ICD-10-CM

## 2019-10-04 PROCEDURE — 99024 POSTOP FOLLOW-UP VISIT: CPT

## 2019-10-04 RX ORDER — LISINOPRIL 5 MG/1
5 TABLET ORAL
Refills: 0 | Status: ACTIVE | COMMUNITY

## 2019-10-07 DIAGNOSIS — Z71.89 OTHER SPECIFIED COUNSELING: ICD-10-CM

## 2019-10-08 NOTE — ADDENDUM
[FreeTextEntry1] : 2019 \par  \par OFFICE FOLLOWUP NOTE \par  \par  \par Re:	Mr. Radha  \par :	53 \par MRN:  [id number] \par  \par Mr. Garcia is a 64-year-old man who underwent endoscopic 3rd ventriculostomy on 2019 to treat his symptomatic obstructive hydrocephalus. Prior to surgery he suffered from dizziness, headaches, multiple falls and imbalance. Postoperatively he has been improving. Mr. Garcia indicates that he feels much better in terms of his balance and walking and he no longer has dizziness. He does occasionally get headaches which are not debilitating and are easily controlled with analgesics. Overall he feels that he is significantly better since surgery. He continues to do physical therapy several times per week.  \par  \par On examination he is nonfocal. \par  \par I discussed Mr. Garcia’s progress with him. I told him that I am quite pleased with how he is coming along. I would like to see him again in 3 months with a followup head CT scan. \par  \par  \par  \par  \par  \par Jeff Garces M.D. \par  of Neurosurgery \par [facility] \[facility] Department of Neurosurgery \par [facility] \par [address] \par [facility], Third Floor \[place] [address] \par Tel: [phone] \par Email:  [email or web address] \par  \par  \par SHANELL/trevor [employer] #[id number] \par  \par  \par

## 2019-10-08 NOTE — ASSESSMENT
[FreeTextEntry1] : Patient doing well. No worsening complaints of vertigo, dizziness or headache. Will hold off off on  shunt. Incision site healing well with no signs of infection. Patient to follow up in 3 months with a CT scan.\par \par Plan:\par 1. Three month follow up visit and a  CT scan.\par 2. Fill up Home care paperwork.

## 2019-10-08 NOTE — PHYSICAL EXAM
[General Appearance - Alert] : alert [General Appearance - In No Acute Distress] : in no acute distress [Clean] : clean [Dry] : dry [Well-Healed] : well-healed [Intact] : intact [No Drainage] : without drainage [Normal Skin] : normal [Oriented To Time, Place, And Person] : oriented to person, place, and time [Impaired Insight] : insight and judgment were intact [Person] : oriented to person [Time] : oriented to time [Cranial Nerves Optic (II)] : visual acuity intact bilaterally,  pupils equal round and reactive to light [Cranial Nerves Oculomotor (III)] : extraocular motion intact [Cranial Nerves Trigeminal (V)] : facial sensation intact symmetrically [Cranial Nerves Vestibulocochlear (VIII)] : hearing was intact bilaterally [Cranial Nerves Facial (VII)] : face symmetrical [Cranial Nerves Glossopharyngeal (IX)] : tongue and palate midline [Cranial Nerves Accessory (XI - Cranial And Spinal)] : head turning and shoulder shrug symmetric [Cranial Nerves Hypoglossal (XII)] : there was no tongue deviation with protrusion [Motor Tone] : muscle tone was normal in all four extremities [Motor Strength] : muscle strength was normal in all four extremities [] : no respiratory distress [Sclera] : the sclera and conjunctiva were normal [Respiration, Rhythm And Depth] : normal respiratory rhythm and effort [Exaggerated Use Of Accessory Muscles For Inspiration] : no accessory muscle use [Apical Impulse] : the apical impulse was normal [Heart Rate And Rhythm] : heart rate was normal and rhythm regular [Edema] : there was no peripheral edema [Abnormal Walk] : normal gait [Skin Color & Pigmentation] : normal skin color and pigmentation [Place] : disoriented to place [FreeTextEntry1] : frontal incision CDI- no drainage

## 2019-10-08 NOTE — HISTORY OF PRESENT ILLNESS
[FreeTextEntry1] : 63 year old man who presented to [facility] with headaches, gait imbalance and vertigo. He was found to have lateral and third ventriculomegaly. The radiographic pattern of ventriculomegaly suggested obstructive hydrocephalus as the fourth ventricle is relatively smaller. \par \par He underwent third ventriculostomy to treat symptomatic hydrocephalus and presents today for staple removal. Surgical site is clean, dry and intact with no s/s of infection. Staples removed without difficulty, area cleansed with betadine, patient tolerated well.

## 2019-10-08 NOTE — REASON FOR VISIT
[Family Member] : family member [de-identified] : 7/11/19 [de-identified] : Ventriculocisternostomy of third ventricle with use of neuroendoscope; ventricular puncture with placement of external ventricular catheter, endoscope assisted; use of frameless stereotactic navigation [de-identified] : \par TODAY'S VISIT:\par He reports occasional vertigo that is much improved from his last office visit. Denies difficulty walking. He reports intermittent right sided headache relieved by Tylenol. He continues to have physical therapy every other day.\par \par PREVIOUS OFFICE VISIT 8/5/19:\par Reports occasional vertigo. However, he states this is significantly improved from initial presentation preoperatively. \par Denies difficulty walking.\par Reports occasional mild headaches, relieved with medication.\par \par PREVIOUS OFFICE VISIT 7/23/19:\par Presents today for staple removal.\par Reports he is doing well, still has complaints of occasional frontal h/a relieved with motrin.\par Denies any signs of postop wound infection which could include but is not limited to redness/swelling/purulent drainage\par Denies CP/SOB/unilateral leg edema. Denies nausea, vomiting, dizziness, weakness, vertigo and gait instability. \par He is slowly introducing preop activities\par \par Reports improvement in walking.\par

## 2019-12-11 NOTE — PATIENT PROFILE ADULT. - BLOOD TRANSFUSION, PREVIOUS, PROFILE
Pulmonary, Critical Care, and Sleep Medicine~Progress Note    Name: Yenni Jalloh MRN: [id number]   : 1937 Hospital: [address]   Date: 2019 9:59 AM Admission: 2019     Impression Plan   1. Acute on chronic hypercapnic resp failure secondary to O2 dependent COPD and HF  2. A fib with RVR  3. Metabolic encephalopathy; secondary to CO2. Seems to be improving with NIV  4. Dementia   5. DNR  1. Continue NIV qhs and prn at night  2. Diuretics as you are  3. Hospitalist does not think he is a candidate for trilogy and I agree with that  4. On doxy   5. Duonebs/brovana/pulmicort   6. steroids. Reduce as able   7. Warfarin   8. O2 titration around 90%   9. Hypercapnia will be an ongoing issue. 10. palliative care getting involved      Daily Progression:    Mental status is slowly declining     I have reviewed the labs and previous days notes. Pertinent items are noted in HPI. OBJECTIVE:     Vital Signs:       Visit Vitals  /82   Pulse (!) 109   Temp 97.9 °F (36.6 °C)   Resp 18   Ht 5' 10\" (1.778 m)   Wt 79.7 kg (175 lb 12.8 oz)   SpO2 96%   BMI 25.22 kg/m²      Temp (24hrs), Av °F (36.7 °C), Min:97.8 °F (36.6 °C), Max:98.3 °F (36.8 °C)     Intake/Output:     Last shift: No intake/output data recorded.     Last 3 shifts: 1 -  0700  In: 240 [P.O.:240]  Out: 1125 [Urine:1125]          Intake/Output Summary (Last 24 hours) at 2019 1028  Last data filed at 2019 0700  Gross per 24 hour   Intake 240 ml   Output 150 ml   Net 90 ml       Physical Exam:                                        Exam Findings Other   General: No resp distress noted, appears stated age    HEENT:  No ulcers, JVD not elevated, no cervical LAD    Chest: No pectus deformity, normal chest rise b/l    HEART:  RRR, no murmurs/rubs/gallops    Lungs:  diminished BS at bases    ABD: Soft/NT, non rigid mildly distended    EXT: No cyanosis/clubbing/edema, normal peripheral pulses    Skin: No rashes or ulcers, no mottling    Neuro: Resting in bed         Medications:  Current Facility-Administered Medications   Medication Dose Route Frequency    senna-docusate (PERICOLACE) 8.6-50 mg per tablet 1 Tab  1 Tab Oral DAILY    furosemide (LASIX) tablet 40 mg  40 mg Oral DAILY    predniSONE (DELTASONE) tablet 40 mg  40 mg Oral DAILY WITH BREAKFAST    doxycycline (VIBRA-TABS) tablet 100 mg  100 mg Oral Q12H    dilTIAZem (CARDIZEM) IR tablet 30 mg  30 mg Oral Q8H    metoprolol tartrate (LOPRESSOR) tablet 50 mg  50 mg Oral Q12H    albuterol-ipratropium (DUO-NEB) 2.5 MG-0.5 MG/3 ML  3 mL Nebulization Q6H RT    melatonin tablet 3 mg  3 mg Oral QHS    influenza vaccine 2019-20 (6 mos+)(PF) (FLUARIX/FLULAVAL/FLUZONE QUAD) injection 0.5 mL  0.5 mL IntraMUSCular PRIOR TO DISCHARGE    Warfarin - Pharmacy to Dose   Other Rx Dosing/Monitoring    lactobac ac& pc-s.therm-b.anim (HELADIO Q/RISAQUAD)  1 Cap Oral DAILY    arformoterol (BROVANA) neb solution 15 mcg  15 mcg Nebulization BID RT    And    budesonide (PULMICORT) 500 mcg/2 ml nebulizer suspension  500 mcg Nebulization BID RT    traMADol (ULTRAM) tablet 50 mg  50 mg Oral Q6H PRN    hydrALAZINE (APRESOLINE) 20 mg/mL injection 20 mg  20 mg IntraVENous Q6H PRN    lisinopril (PRINIVIL, ZESTRIL) tablet 10 mg  10 mg Oral DAILY    sodium chloride (NS) flush 5-40 mL  5-40 mL IntraVENous Q8H    sodium chloride (NS) flush 5-40 mL  5-40 mL IntraVENous PRN    acetaminophen (TYLENOL) tablet 650 mg  650 mg Oral Q4H PRN    ondansetron (ZOFRAN) injection 4 mg  4 mg IntraVENous Q4H PRN    aspirin delayed-release tablet 81 mg  81 mg Oral DAILY    atorvastatin (LIPITOR) tablet 40 mg  40 mg Oral DAILY    thiamine HCL (B-1) tablet 100 mg  100 mg Oral DAILY       Labs:  ABG Recent Labs     12/10/19  1510 12/10/19  1502 12/10/19  0450 12/09/19  1706   PHI 7.382 7.376 7.450 7.378   PCO2I >90.0* >90.0* 84.1* >90.0*   PO2I 64* 63* 90 93   HCO3I  --   --  58.5*  --    SO2I  --   --  97  --    FIO2I  --   --   --  40        CBC Recent Labs     12/10/19  0257 12/09/19 0311   WBC 16.9* 18.6*   HGB 11.7* 12.6   HCT 39.3 41.3    235   .3* 101.2*   MCH 30.2 68.6        Metabolic  Panel Recent Labs     12/11/19  0744 12/10/19  0257 12/09/19  1003 12/09/19 0311   NA  --  143 139 140   K  --  3.6 3.4* 3.2*   CL  --  94* 91* 93*   CO2  --  >45* >45* >45*   GLU  --  153* 187* 176*   BUN  --  36* 29* 28*   CREA  --  0.99 1.14 1.09   CA  --  9.6 10.0 9.2   INR 1.4* 1.6*  --  2.0*        Pertinent Labs                Reid Burrell PA-C  12/11/2019 no

## 2020-01-06 ENCOUNTER — APPOINTMENT (OUTPATIENT)
Dept: NEUROSURGERY | Facility: CLINIC | Age: 65
End: 2020-01-06

## 2020-03-01 PROCEDURE — G9005: CPT

## 2020-06-04 NOTE — ED ADULT NURSE NOTE - NS ED NURSE LEVEL OF CONSCIOUSNESS SPEECH
Speaking Coherently -UCx enterococci  -C/w with 5d of augmentin (6/1 - ) -UCx enterococci  -C/w with 5d of augmentin (6/1 - 6/5)

## 2020-11-30 ENCOUNTER — INPATIENT (INPATIENT)
Facility: HOSPITAL | Age: 65
LOS: 1 days | Discharge: HOME HEALTH | End: 2020-12-02
Attending: INTERNAL MEDICINE | Admitting: INTERNAL MEDICINE
Payer: MEDICARE

## 2020-11-30 VITALS
TEMPERATURE: 97 F | DIASTOLIC BLOOD PRESSURE: 86 MMHG | HEART RATE: 68 BPM | SYSTOLIC BLOOD PRESSURE: 122 MMHG | RESPIRATION RATE: 18 BRPM | HEIGHT: 62 IN | OXYGEN SATURATION: 100 %

## 2020-11-30 DIAGNOSIS — R07.9 CHEST PAIN, UNSPECIFIED: ICD-10-CM

## 2020-11-30 DIAGNOSIS — M1A.9XX1 CHRONIC GOUT, UNSPECIFIED, WITH TOPHUS (TOPHI): ICD-10-CM

## 2020-11-30 DIAGNOSIS — Z95.1 PRESENCE OF AORTOCORONARY BYPASS GRAFT: Chronic | ICD-10-CM

## 2020-11-30 DIAGNOSIS — Z95.5 PRESENCE OF CORONARY ANGIOPLASTY IMPLANT AND GRAFT: Chronic | ICD-10-CM

## 2020-11-30 LAB
ALBUMIN SERPL ELPH-MCNC: 4 G/DL — SIGNIFICANT CHANGE UP (ref 3.3–5)
ALP SERPL-CCNC: 60 U/L — SIGNIFICANT CHANGE UP (ref 40–120)
ALT FLD-CCNC: 9 U/L — SIGNIFICANT CHANGE UP (ref 4–41)
ANION GAP SERPL CALC-SCNC: 12 MMO/L — SIGNIFICANT CHANGE UP (ref 7–14)
APPEARANCE UR: CLEAR — SIGNIFICANT CHANGE UP
APTT BLD: 196.2 SEC — CRITICAL HIGH (ref 27–36.3)
APTT BLD: 25.5 SEC — LOW (ref 27–36.3)
AST SERPL-CCNC: 15 U/L — SIGNIFICANT CHANGE UP (ref 4–40)
BASOPHILS # BLD AUTO: 0.03 K/UL — SIGNIFICANT CHANGE UP (ref 0–0.2)
BASOPHILS NFR BLD AUTO: 0.5 % — SIGNIFICANT CHANGE UP (ref 0–2)
BILIRUB SERPL-MCNC: 0.3 MG/DL — SIGNIFICANT CHANGE UP (ref 0.2–1.2)
BILIRUB UR-MCNC: NEGATIVE — SIGNIFICANT CHANGE UP
BLOOD UR QL VISUAL: NEGATIVE — SIGNIFICANT CHANGE UP
BUN SERPL-MCNC: 23 MG/DL — SIGNIFICANT CHANGE UP (ref 7–23)
CALCIUM SERPL-MCNC: 9.4 MG/DL — SIGNIFICANT CHANGE UP (ref 8.4–10.5)
CHLORIDE SERPL-SCNC: 99 MMOL/L — SIGNIFICANT CHANGE UP (ref 98–107)
CO2 SERPL-SCNC: 19 MMOL/L — LOW (ref 22–31)
COLOR SPEC: SIGNIFICANT CHANGE UP
CREAT SERPL-MCNC: 1.81 MG/DL — HIGH (ref 0.5–1.3)
EOSINOPHIL # BLD AUTO: 0.32 K/UL — SIGNIFICANT CHANGE UP (ref 0–0.5)
EOSINOPHIL NFR BLD AUTO: 5.2 % — SIGNIFICANT CHANGE UP (ref 0–6)
GLUCOSE SERPL-MCNC: 130 MG/DL — HIGH (ref 70–99)
GLUCOSE UR-MCNC: NEGATIVE — SIGNIFICANT CHANGE UP
HCT VFR BLD CALC: 37.2 % — LOW (ref 39–50)
HCT VFR BLD CALC: 42.5 % — SIGNIFICANT CHANGE UP (ref 39–50)
HGB BLD-MCNC: 11.9 G/DL — LOW (ref 13–17)
HGB BLD-MCNC: 13.2 G/DL — SIGNIFICANT CHANGE UP (ref 13–17)
IMM GRANULOCYTES NFR BLD AUTO: 0.3 % — SIGNIFICANT CHANGE UP (ref 0–1.5)
INR BLD: 1.07 — SIGNIFICANT CHANGE UP (ref 0.88–1.16)
KETONES UR-MCNC: NEGATIVE — SIGNIFICANT CHANGE UP
LEUKOCYTE ESTERASE UR-ACNC: NEGATIVE — SIGNIFICANT CHANGE UP
LYMPHOCYTES # BLD AUTO: 1.58 K/UL — SIGNIFICANT CHANGE UP (ref 1–3.3)
LYMPHOCYTES # BLD AUTO: 25.9 % — SIGNIFICANT CHANGE UP (ref 13–44)
MCHC RBC-ENTMCNC: 26.5 PG — LOW (ref 27–34)
MCHC RBC-ENTMCNC: 26.9 PG — LOW (ref 27–34)
MCHC RBC-ENTMCNC: 31.1 % — LOW (ref 32–36)
MCHC RBC-ENTMCNC: 32 % — SIGNIFICANT CHANGE UP (ref 32–36)
MCV RBC AUTO: 84.2 FL — SIGNIFICANT CHANGE UP (ref 80–100)
MCV RBC AUTO: 85.2 FL — SIGNIFICANT CHANGE UP (ref 80–100)
MONOCYTES # BLD AUTO: 0.93 K/UL — HIGH (ref 0–0.9)
MONOCYTES NFR BLD AUTO: 15.2 % — HIGH (ref 2–14)
NEUTROPHILS # BLD AUTO: 3.22 K/UL — SIGNIFICANT CHANGE UP (ref 1.8–7.4)
NEUTROPHILS NFR BLD AUTO: 52.9 % — SIGNIFICANT CHANGE UP (ref 43–77)
NITRITE UR-MCNC: NEGATIVE — SIGNIFICANT CHANGE UP
NRBC # FLD: 0 K/UL — SIGNIFICANT CHANGE UP (ref 0–0)
NRBC # FLD: 0 K/UL — SIGNIFICANT CHANGE UP (ref 0–0)
PH UR: 7 — SIGNIFICANT CHANGE UP (ref 5–8)
PLATELET # BLD AUTO: 254 K/UL — SIGNIFICANT CHANGE UP (ref 150–400)
PLATELET # BLD AUTO: 282 K/UL — SIGNIFICANT CHANGE UP (ref 150–400)
PMV BLD: 10 FL — SIGNIFICANT CHANGE UP (ref 7–13)
PMV BLD: 9.9 FL — SIGNIFICANT CHANGE UP (ref 7–13)
POTASSIUM SERPL-MCNC: 5.1 MMOL/L — SIGNIFICANT CHANGE UP (ref 3.5–5.3)
POTASSIUM SERPL-SCNC: 5.1 MMOL/L — SIGNIFICANT CHANGE UP (ref 3.5–5.3)
PROT SERPL-MCNC: 6.9 G/DL — SIGNIFICANT CHANGE UP (ref 6–8.3)
PROT UR-MCNC: NEGATIVE — SIGNIFICANT CHANGE UP
PROTHROM AB SERPL-ACNC: 12.2 SEC — SIGNIFICANT CHANGE UP (ref 10.6–13.6)
RBC # BLD: 4.42 M/UL — SIGNIFICANT CHANGE UP (ref 4.2–5.8)
RBC # BLD: 4.99 M/UL — SIGNIFICANT CHANGE UP (ref 4.2–5.8)
RBC # FLD: 16.4 % — HIGH (ref 10.3–14.5)
RBC # FLD: 16.8 % — HIGH (ref 10.3–14.5)
SARS-COV-2 RNA SPEC QL NAA+PROBE: SIGNIFICANT CHANGE UP
SODIUM SERPL-SCNC: 130 MMOL/L — LOW (ref 135–145)
SP GR SPEC: 1.01 — SIGNIFICANT CHANGE UP (ref 1–1.04)
TROPONIN T, HIGH SENSITIVITY: 12 NG/L — SIGNIFICANT CHANGE UP (ref ?–14)
TROPONIN T, HIGH SENSITIVITY: 13 NG/L — SIGNIFICANT CHANGE UP (ref ?–14)
UROBILINOGEN FLD QL: NORMAL — SIGNIFICANT CHANGE UP
WBC # BLD: 6.1 K/UL — SIGNIFICANT CHANGE UP (ref 3.8–10.5)
WBC # BLD: 6.54 K/UL — SIGNIFICANT CHANGE UP (ref 3.8–10.5)
WBC # FLD AUTO: 6.1 K/UL — SIGNIFICANT CHANGE UP (ref 3.8–10.5)
WBC # FLD AUTO: 6.54 K/UL — SIGNIFICANT CHANGE UP (ref 3.8–10.5)

## 2020-11-30 PROCEDURE — 71045 X-RAY EXAM CHEST 1 VIEW: CPT | Mod: 26

## 2020-11-30 PROCEDURE — 99285 EMERGENCY DEPT VISIT HI MDM: CPT

## 2020-11-30 PROCEDURE — 99223 1ST HOSP IP/OBS HIGH 75: CPT

## 2020-11-30 RX ORDER — ATORVASTATIN CALCIUM 80 MG/1
20 TABLET, FILM COATED ORAL AT BEDTIME
Refills: 0 | Status: DISCONTINUED | OUTPATIENT
Start: 2020-11-30 | End: 2020-12-02

## 2020-11-30 RX ORDER — ACETAMINOPHEN 500 MG
650 TABLET ORAL EVERY 6 HOURS
Refills: 0 | Status: DISCONTINUED | OUTPATIENT
Start: 2020-11-30 | End: 2020-12-02

## 2020-11-30 RX ORDER — ISOSORBIDE MONONITRATE 60 MG/1
30 TABLET, EXTENDED RELEASE ORAL DAILY
Refills: 0 | Status: DISCONTINUED | OUTPATIENT
Start: 2020-11-30 | End: 2020-12-02

## 2020-11-30 RX ORDER — RANOLAZINE 500 MG/1
1000 TABLET, FILM COATED, EXTENDED RELEASE ORAL
Refills: 0 | Status: DISCONTINUED | OUTPATIENT
Start: 2020-11-30 | End: 2020-12-02

## 2020-11-30 RX ORDER — ASPIRIN/CALCIUM CARB/MAGNESIUM 324 MG
1 TABLET ORAL
Qty: 0 | Refills: 0 | DISCHARGE

## 2020-11-30 RX ORDER — RANOLAZINE 500 MG/1
1 TABLET, FILM COATED, EXTENDED RELEASE ORAL
Qty: 0 | Refills: 0 | DISCHARGE

## 2020-11-30 RX ORDER — ISOSORBIDE MONONITRATE 60 MG/1
1 TABLET, EXTENDED RELEASE ORAL
Qty: 0 | Refills: 0 | DISCHARGE

## 2020-11-30 RX ORDER — METOPROLOL TARTRATE 50 MG
1 TABLET ORAL
Qty: 0 | Refills: 0 | DISCHARGE

## 2020-11-30 RX ORDER — METOPROLOL TARTRATE 50 MG
25 TABLET ORAL
Refills: 0 | Status: DISCONTINUED | OUTPATIENT
Start: 2020-11-30 | End: 2020-12-02

## 2020-11-30 RX ORDER — HEPARIN SODIUM 5000 [USP'U]/ML
INJECTION INTRAVENOUS; SUBCUTANEOUS
Qty: 25000 | Refills: 0 | Status: DISCONTINUED | OUTPATIENT
Start: 2020-11-30 | End: 2020-11-30

## 2020-11-30 RX ORDER — CLOPIDOGREL BISULFATE 75 MG/1
600 TABLET, FILM COATED ORAL ONCE
Refills: 0 | Status: COMPLETED | OUTPATIENT
Start: 2020-11-30 | End: 2020-11-30

## 2020-11-30 RX ORDER — HYDROMORPHONE HYDROCHLORIDE 2 MG/ML
0.5 INJECTION INTRAMUSCULAR; INTRAVENOUS; SUBCUTANEOUS ONCE
Refills: 0 | Status: DISCONTINUED | OUTPATIENT
Start: 2020-11-30 | End: 2020-11-30

## 2020-11-30 RX ORDER — NITROGLYCERIN 6.5 MG
1 CAPSULE, EXTENDED RELEASE ORAL
Qty: 0 | Refills: 0 | DISCHARGE

## 2020-11-30 RX ORDER — CLOPIDOGREL BISULFATE 75 MG/1
75 TABLET, FILM COATED ORAL DAILY
Refills: 0 | Status: DISCONTINUED | OUTPATIENT
Start: 2020-12-01 | End: 2020-12-02

## 2020-11-30 RX ORDER — HEPARIN SODIUM 5000 [USP'U]/ML
4100 INJECTION INTRAVENOUS; SUBCUTANEOUS ONCE
Refills: 0 | Status: COMPLETED | OUTPATIENT
Start: 2020-11-30 | End: 2020-11-30

## 2020-11-30 RX ORDER — HEPARIN SODIUM 5000 [USP'U]/ML
4100 INJECTION INTRAVENOUS; SUBCUTANEOUS EVERY 6 HOURS
Refills: 0 | Status: DISCONTINUED | OUTPATIENT
Start: 2020-11-30 | End: 2020-11-30

## 2020-11-30 RX ORDER — AMLODIPINE BESYLATE 2.5 MG/1
5 TABLET ORAL DAILY
Refills: 0 | Status: DISCONTINUED | OUTPATIENT
Start: 2020-11-30 | End: 2020-12-02

## 2020-11-30 RX ORDER — SODIUM CHLORIDE 9 MG/ML
1000 INJECTION INTRAMUSCULAR; INTRAVENOUS; SUBCUTANEOUS
Refills: 0 | Status: DISCONTINUED | OUTPATIENT
Start: 2020-11-30 | End: 2020-12-01

## 2020-11-30 RX ORDER — ASPIRIN/CALCIUM CARB/MAGNESIUM 324 MG
81 TABLET ORAL DAILY
Refills: 0 | Status: DISCONTINUED | OUTPATIENT
Start: 2020-11-30 | End: 2020-12-02

## 2020-11-30 RX ORDER — PANTOPRAZOLE SODIUM 20 MG/1
40 TABLET, DELAYED RELEASE ORAL
Refills: 0 | Status: DISCONTINUED | OUTPATIENT
Start: 2020-11-30 | End: 2020-12-02

## 2020-11-30 RX ORDER — ASPIRIN/CALCIUM CARB/MAGNESIUM 324 MG
243 TABLET ORAL ONCE
Refills: 0 | Status: COMPLETED | OUTPATIENT
Start: 2020-11-30 | End: 2020-11-30

## 2020-11-30 RX ORDER — AMLODIPINE BESYLATE 2.5 MG/1
1 TABLET ORAL
Qty: 0 | Refills: 0 | DISCHARGE

## 2020-11-30 RX ADMIN — SODIUM CHLORIDE 100 MILLILITER(S): 9 INJECTION INTRAMUSCULAR; INTRAVENOUS; SUBCUTANEOUS at 19:43

## 2020-11-30 RX ADMIN — PANTOPRAZOLE SODIUM 40 MILLIGRAM(S): 20 TABLET, DELAYED RELEASE ORAL at 09:23

## 2020-11-30 RX ADMIN — HYDROMORPHONE HYDROCHLORIDE 0.5 MILLIGRAM(S): 2 INJECTION INTRAMUSCULAR; INTRAVENOUS; SUBCUTANEOUS at 12:44

## 2020-11-30 RX ADMIN — Medication 25 MILLIGRAM(S): at 22:29

## 2020-11-30 RX ADMIN — Medication 81 MILLIGRAM(S): at 09:23

## 2020-11-30 RX ADMIN — AMLODIPINE BESYLATE 5 MILLIGRAM(S): 2.5 TABLET ORAL at 09:23

## 2020-11-30 RX ADMIN — HEPARIN SODIUM 0 UNIT(S)/HR: 5000 INJECTION INTRAVENOUS; SUBCUTANEOUS at 13:39

## 2020-11-30 RX ADMIN — Medication 243 MILLIGRAM(S): at 02:02

## 2020-11-30 RX ADMIN — CLOPIDOGREL BISULFATE 600 MILLIGRAM(S): 75 TABLET, FILM COATED ORAL at 07:02

## 2020-11-30 RX ADMIN — HEPARIN SODIUM 800 UNIT(S)/HR: 5000 INJECTION INTRAVENOUS; SUBCUTANEOUS at 07:02

## 2020-11-30 RX ADMIN — RANOLAZINE 1000 MILLIGRAM(S): 500 TABLET, FILM COATED, EXTENDED RELEASE ORAL at 22:29

## 2020-11-30 RX ADMIN — HEPARIN SODIUM 600 UNIT(S)/HR: 5000 INJECTION INTRAVENOUS; SUBCUTANEOUS at 14:44

## 2020-11-30 RX ADMIN — ATORVASTATIN CALCIUM 20 MILLIGRAM(S): 80 TABLET, FILM COATED ORAL at 22:29

## 2020-11-30 RX ADMIN — HYDROMORPHONE HYDROCHLORIDE 0.5 MILLIGRAM(S): 2 INJECTION INTRAMUSCULAR; INTRAVENOUS; SUBCUTANEOUS at 12:25

## 2020-11-30 RX ADMIN — HEPARIN SODIUM 4100 UNIT(S): 5000 INJECTION INTRAVENOUS; SUBCUTANEOUS at 07:02

## 2020-11-30 NOTE — ED ADULT NURSE NOTE - INTERVENTIONS DEFINITIONS
Monitor for mental status changes and reorient to person, place, and time/[place] to call system/Non-slip footwear when patient is off stretcher/Physically safe environment: no spills, clutter or unnecessary equipment/Stretcher in lowest position, wheels locked, appropriate side rails in place/Instruct patient to call for assistance

## 2020-11-30 NOTE — H&P ADULT - NSHPPHYSICALEXAM_GEN_ALL_CORE
PHYSICAL EXAM:      Constitutional: NAD, well-groomed, well-developed  HEENT:  EOMI, Normal Hearing  Neck: No LAD, No JVD  Back: Normal spine flexure, No CVA tenderness  Respiratory: CTAB  Cardiovascular: S1 and S2, RRR  Gastrointestinal: BS+, soft, NT/ND  Extremities: No peripheral edema  Vascular: 2+ peripheral pulses  Neurological: A/O x 3, no focal deficits  Psychiatric: Normal mood, normal affect  Musculoskeletal: 5/5 strength b/l upper and lower extremities; arthritic joints in hands, elbows  Skin: No rashes

## 2020-11-30 NOTE — H&P ADULT - NSHPREVIEWOFSYSTEMS_GEN_ALL_CORE
Review of Systems:   CONSTITUTIONAL: No fever  EYES: No eye pain, visual disturbances, or discharge  ENMT:  No difficulty hearing, tinnitus, vertigo; No sinus or throat pain  NECK: No pain or stiffness  RESPIRATORY: No cough, wheezing, chills or hemoptysis; No shortness of breath  CARDIOVASCULAR: + chest pain, no palpitations, dizziness, or leg swelling  GASTROINTESTINAL: No abdominal or epigastric pain. No nausea, vomiting, or hematemesis; No diarrhea or constipation. No melena or hematochezia.  GENITOURINARY: No dysuria, frequency, hematuria, or incontinence  NEUROLOGICAL: No headaches, memory loss, loss of strength, numbness, or tremors  SKIN: No itching, burning, rashes, or lesions   LYMPH NODES: No enlarged glands  ENDOCRINE: No heat or cold intolerance; No hair loss  MUSCULOSKELETAL: No joint pain or swelling; No muscle, back, or extremity pain

## 2020-11-30 NOTE — H&P ADULT - NSHPLABSRESULTS_GEN_ALL_CORE
11.9   6.10  )-----------( 254      ( 2020 02:10 )             37.2     11-30    130<L>  |  99  |  23  ----------------------------<  130<H>  5.1   |  19<L>  |  1.81<H>    Ca    9.4      2020 02:10    TPro  6.9  /  Alb  4.0  /  TBili  0.3  /  DBili  x   /  AST  15  /  ALT  9   /  AlkPhos  60  11-30    CAPILLARY BLOOD GLUCOSE          Urinalysis Basic - ( 2020 04:50 )    Color: LIGHT YELLOW / Appearance: CLEAR / S.011 / pH: 7.0  Gluc: NEGATIVE / Ketone: NEGATIVE  / Bili: NEGATIVE / Urobili: NORMAL   Blood: NEGATIVE / Protein: NEGATIVE / Nitrite: NEGATIVE   Leuk Esterase: NEGATIVE / RBC: x / WBC x   Sq Epi: x / Non Sq Epi: x / Bacteria: x      Vital Signs Last 24 Hrs  T(C): 36.3 (2020 00:45), Max: 36.3 (2020 00:45)  T(F): 97.4 (2020 00:45), Max: 97.4 (2020 00:45)  HR: 59 (2020 06:10) (59 - 72)  BP: 146/78 (2020 06:10) (122/86 - 150/70)  BP(mean): --  RR: 17 (2020 06:10) (17 - 18)  SpO2: 100% (2020 06:10) (98% - 100%)

## 2020-11-30 NOTE — ED ADULT NURSE REASSESSMENT NOTE - NS ED NURSE REASSESS COMMENT FT1
Report given to [facility] SALTY Betancur, pt moved to [facility], area

## 2020-11-30 NOTE — ED PROVIDER NOTE - CLINICAL SUMMARY MEDICAL DECISION MAKING FREE TEXT BOX
pt with episode of CP.  Does not seem overly consistent with an ACS but has many risk factors and has multiple stents after CABG.  During last admission did not get stress or cath as was felt to be atypical.  Will give ASA and check labs here.  Dissection is in differential but presentation and time course is very atypical.

## 2020-11-30 NOTE — PHARMACOTHERAPY INTERVENTION NOTE - COMMENTS
Medication history is complete. Medication list updated in Outpatient Medication Record (OMR). Medication list confirmed with outpatient pharmacy.

## 2020-11-30 NOTE — H&P ADULT - PROBLEM SELECTOR PLAN 1
-given extensive cardiac history and ongoing chest pain, will place on heparin gtt, aspirin/ plavix load. D/W Dr Monahan  -c/w BB, statin

## 2020-11-30 NOTE — CONSULT NOTE ADULT - ASSESSMENT
Cath Sep 2020, cath revealed patent cardiac stents  as well as patent SVG-OM  Cath 5/9/2018: PCI to prox LAD     a/p  66 yo m h/o cabg 2015, multiple stents,  gout, ckd, PUD, hydrocephalus s/p ventriculostomy presents with left-sided chest pain     1. Atypical chest pain , CAD s/p PCI, CABG   -reproducible on exam   -per chart review had a recent cardiac cath in sept which revealed patent cardiac stents  as well as patent SVG-OM  -HS trop negative (13-12) no acs on ecg ,no decomp CHF on exam   -s/p plavix load  -currenlty on hep gtt, asa and plavix   -resume outpt dose imdur and Ranexa   -check echo   -possible plan for repeat cath   -c/w BB, statin     dvt ppx

## 2020-11-30 NOTE — ED ADULT TRIAGE NOTE - CHIEF COMPLAINT QUOTE
pt c/o chest pain x1 day. Pt denies any associated symptoms including weakness, SOB, numbness. Pt states took sublingual Nitroglycerin prior to arrival but did not relieve pain. Hx CABG, HLD, HTN pt c/o chest pain x1 day. Pt denies any associated symptoms including weakness, SOB, numbness. Pt states took 2 sublingual Nitroglycerin prior to arrival but did not relieve pain. Hx CABG, HLD, HTN

## 2020-11-30 NOTE — CONSULT NOTE ADULT - SUBJECTIVE AND OBJECTIVE BOX
CARDIOLOGY CONSULT - Dr. Monahan         HPI:  prior visits to [facility] in September 2020 under MRN [id number] per h/p   66 yo m h/o cabg 2015, multiple stents including proximal and  mid LAD currently on DAPT, gout, ckd, PUD, hydrocephalus s/p ventriculostomy presents with left-sided chest pain that woke him from sleep. CP diminished but still persists. he also endorses left arm, abd , and lle pain. Chest pain is reproducible on exam .   EKG unremarkable, HS trop x 2 neg. Pt denies cough, fever, sob, lightheadedness, nausea. Per h/p pt has a another MRN :  In Sep 2020, underwent cath that revealed patent cardiac stents  as well as patent SVG-OM  currently on hep gtt   ROS otherwise negative       PAST MEDICAL & SURGICAL HISTORY:  Chronic gout of elbow, unspecified cause, unspecified laterality    HLD (hyperlipidemia)    CAD (coronary artery disease)    HTN (hypertension)    S/P CABG x 3  2015    H/O heart artery stent  2016            PREVIOUS DIAGNOSTIC TESTING:    [x ] Echocardiogram:    < from: Transthoracic Echocardiogram (05.15.18 @ 13:09) >  Ejection Fraction (Teicholtz): 61 %  ------------------------------------------------------------------------  OBSERVATIONS:  Mitral Valve: Mitral annular calcification, otherwise  normal mitral valve. Mild mitral regurgitation.  Aortic Root: Normal aortic root.  Aortic Valve: Calcified trileaflet aortic valve with normal  opening. Mild aortic regurgitation.  Left Atrium: Normal left atrium.  Left Ventricle: Endocardium not well visualized; grossly  normal left ventricular systolic function. Normal left  ventricular internal dimensions and wall thicknesses.  Right Heart: Normal right atrium. The right ventricle is  not well visualized; grossly normal right ventricular  systolic function. Normal tricuspid valve.  Minimal  tricuspid regurgitation. Normal pulmonic valve.  Pericardium/PleuraNormal pericardium with no pericardial  effusion.  ------------------------------------------------------------------------  CONCLUSIONS:  1. Mitral annular calcification, otherwise normal mitral  valve. Mild mitral regurgitation.  2. Calcified trileaflet aortic valve with normal opening.  Mild aortic regurgitation.  3. Normal left ventricular internal dimensions and wall  thicknesses.  4. Endocardium not well visualized; grossly normal left  ventricular systolicfunction.  5. The right ventricle is not well visualized; grossly  normal right ventricular systolic function.  ------------------------------------------------------------------------  Confirmed on  5/15/2018 - 14:40:25 by Chema Jauregui M.D.  ------------------------------------------------------------------------      [ ]  Catheterization:  Cath Sep 2020, cath revealed patent cardiac stents  as well as patent SVG-OM  Cath 5/9/2018: PCI to prox LAD   [ ] Stress Test:  	    MEDICATIONS:  Home Medications:  amLODIPine 5 mg oral tablet: 1 tab(s) orally once a day (30 Nov 2020 11:18)  atorvastatin 80 mg oral tablet: 1 tab(s) orally once a day (30 Nov 2020 11:18)  Ecotrin Adult Low Strength 81 mg oral delayed release tablet: 1 tab(s) orally once a day (30 Nov 2020 11:18)  isosorbide mononitrate 30 mg oral tablet, extended release: 1 tab(s) orally once a day (30 Nov 2020 11:18)  lisinopril 5 mg oral tablet: 1 tab(s) orally once a day (30 Nov 2020 11:18)  Metoprolol Succinate ER 50 mg oral tablet, extended release: 1 tab(s) orally once a day (30 Nov 2020 11:18)  ranolazine 1000 mg oral tablet, extended release: 1 tab(s) orally 2 times a day (30 Nov 2020 11:18)      MEDICATIONS  (STANDING):  amLODIPine   Tablet 5 milliGRAM(s) Oral daily  aspirin enteric coated 81 milliGRAM(s) Oral daily  atorvastatin 20 milliGRAM(s) Oral at bedtime  heparin  Infusion.  Unit(s)/Hr (8 mL/Hr) IV Continuous <Continuous>  metoprolol tartrate 25 milliGRAM(s) Oral two times a day  pantoprazole    Tablet 40 milliGRAM(s) Oral before breakfast      FAMILY HISTORY:  No pertinent family history in first degree relatives        SOCIAL HISTORY:    [ x] Non-smoker  [ ] Smoker  [ ] Alcohol    Allergies    No Known Allergies    Intolerances    	    REVIEW OF SYSTEMS:  CONSTITUTIONAL: No fever, weight loss, or fatigue  EYES: No eye pain, visual disturbances, or discharge  ENMT:  No difficulty hearing, tinnitus, vertigo; No sinus or throat pain  NECK: No pain or stiffness  RESPIRATORY: No cough, wheezing, chills or hemoptysis; No Shortness of Breath  CARDIOVASCULAR:  see hpi   GASTROINTESTINAL: No abdominal or epigastric pain. No nausea, vomiting, or hematemesis; No diarrhea or constipation. No melena or hematochezia.  GENITOURINARY: No dysuria, frequency, hematuria, or incontinence  NEUROLOGICAL: No headaches, memory loss, loss of strength, numbness, or tremors  SKIN: No itching, burning, rashes, or lesions   	    [ c] All others negative	  [ ] Unable to obtain    PHYSICAL EXAM:  T(C): 36.6 (11-30-20 @ 12:24), Max: 36.6 (11-30-20 @ 12:24)  HR: 69 (11-30-20 @ 12:24) (59 - 72)  BP: 137/82 (11-30-20 @ 12:24) (122/86 - 150/70)  RR: 18 (11-30-20 @ 12:24) (17 - 18)  SpO2: 100% (11-30-20 @ 12:24) (98% - 100%)  Wt(kg): --  I&O's Summary      Appearance: Normal	  Psychiatry: A & O x 3, Mood & affect appropriate  HEENT:   Normal oral mucosa, PERRL, EOMI	  Lymphatic: No lymphadenopathy  Cardiovascular: Normal S1 S2,RRR, No JVD, No murmurs  Respiratory: Lungs clear to auscultation	  Gastrointestinal:  Soft, Non-tender, + BS	  Skin: No rashes, No ecchymoses, No cyanosis	  Neurologic: Non-focal  Extremities: Normal range of motion, No clubbing, cyanosis or edema  Vascular: Peripheral pulses palpable 2+ bilaterally    TELEMETRY: NSR 	    ECG:  	nsr hr 64 bpm  RADIOLOGY:  < from: Xray Chest 1 View AP/PA (11.30.20 @ 02:02) >  FINDINGS:  The lungs are clear.  There are no pleural effusions or pneumothorax.  The cardiomediastinal silhouette is within normal limits.  Median sternotomy.    IMPRESSION:  Clear lungs.          < end of copied text >    OTHER: 	  	  LABS:	 	    CARDIAC MARKERS:  Troponin T, High Sensitivity: 12 ng/L (11-30 @ 04:50)  Troponin T, High Sensitivity: 13 ng/L (11-30 @ 02:10)                                  11.9   6.10  )-----------( 254      ( 30 Nov 2020 02:10 )             37.2     11-30    130<L>  |  99  |  23  ----------------------------<  130<H>  5.1   |  19<L>  |  1.81<H>    Ca    9.4      30 Nov 2020 02:10    TPro  6.9  /  Alb  4.0  /  TBili  0.3  /  DBili  x   /  AST  15  /  ALT  9   /  AlkPhos  60  11-30    PT/INR - ( 30 Nov 2020 06:30 )   PT: 12.2 SEC;   INR: 1.07          PTT - ( 30 Nov 2020 06:30 )  PTT:25.5 SEC  proBNP:   Lipid Profile:   HgA1c:   TSH:

## 2020-11-30 NOTE — CHART NOTE - NSCHARTNOTEFT_GEN_A_CORE
[facility] 65[facility] s/p cardiac cath via right femoral access.  Site is stable with no hematoma, active bleed or swelling.  Dressing noted with small amount of dry blood, however no active bleeding/sweling or hematoma in area. RN will demarcate area, DP pulse is palpable.  Patient denies pain, numbness, tingling, CP, SOB. VSS. Will continue to monitor.     T(C): 36.9 (11-30-20 @ 20:50), Max: 36.9 (11-30-20 @ 20:50)  HR: 68 (11-30-20 @ 20:50) (59 - 72)  BP: 137/79 (11-30-20 @ 20:50) (122/86 - 150/70)  RR: 18 (11-30-20 @ 20:50) (17 - 18)  SpO2: 99% (11-30-20 @ 20:50) (98% - 100%)

## 2020-11-30 NOTE — ED ADULT NURSE NOTE - OBJECTIVE STATEMENT
Patient is a 65y male, A&Ox4, History of CABG, CAD, HLD, HTN, complaining of left sided sharp chest pain radiating down to left upper abdomen x 1 day , started at rest. Placed on cardiac monitor. IV inititated by facilitator RN, 18 gauge right wrist. labs drawn and sent. Awaiting laboratory and radiology results. No acute respiratory distress. Stretcher in lowest position, wheels locked, side rails up as per protocol. Call bell in reach. Will continue to monitor.

## 2020-11-30 NOTE — H&P ADULT - PROBLEM SELECTOR PLAN 2
med rec to pharmacy as pt reports taking gout medicine but unable to identify it off recent med lists med rec to pharmacy as pt reports taking gout medicine but unable to identify it off recent med lists. Verify rest of med list

## 2020-11-30 NOTE — ED PROVIDER NOTE - OBJECTIVE STATEMENT
66 yo with history of CABG, CAD, HLD and HTN presenting with one day of sharp L sided CP that radiates down the L chest to L upper abdomen.  No associated SOB.  CP is not exertional.  Started at rest.  Had a similar pain a few months ago for which he was admitted to the hospital.  No provacative test during that admission

## 2020-11-30 NOTE — H&P ADULT - HISTORY OF PRESENT ILLNESS
prior visits to [facility] in September 2020 under MRN [id number]  66 yo m h/o cabg 2015, multiple stents including proximal and  mid LAD currently on DAPT, gout, ckd, PUD, hydrocephalus s/p ventriculostomy at [facility]. Presents with left-sided chest pain that woke him from sleep. CP diminished but still persists. EKG unremarkable, HS trop x 2 neg. Pt denies cough, fever, sob, lightheadedness, nausea. Presented with CP in Sep 2020, underwent cath that revealed patent cardiac stents  as well as patent SVG-OM

## 2020-12-01 LAB
ANION GAP SERPL CALC-SCNC: 10 MMO/L — SIGNIFICANT CHANGE UP (ref 7–14)
ANION GAP SERPL CALC-SCNC: 11 MMO/L — SIGNIFICANT CHANGE UP (ref 7–14)
ANION GAP SERPL CALC-SCNC: 12 MMO/L — SIGNIFICANT CHANGE UP (ref 7–14)
BUN SERPL-MCNC: 24 MG/DL — HIGH (ref 7–23)
BUN SERPL-MCNC: 24 MG/DL — HIGH (ref 7–23)
BUN SERPL-MCNC: 25 MG/DL — HIGH (ref 7–23)
CALCIUM SERPL-MCNC: 9.6 MG/DL — SIGNIFICANT CHANGE UP (ref 8.4–10.5)
CALCIUM SERPL-MCNC: 9.8 MG/DL — SIGNIFICANT CHANGE UP (ref 8.4–10.5)
CALCIUM SERPL-MCNC: 9.9 MG/DL — SIGNIFICANT CHANGE UP (ref 8.4–10.5)
CHLORIDE SERPL-SCNC: 102 MMOL/L — SIGNIFICANT CHANGE UP (ref 98–107)
CHLORIDE SERPL-SCNC: 102 MMOL/L — SIGNIFICANT CHANGE UP (ref 98–107)
CHLORIDE SERPL-SCNC: 104 MMOL/L — SIGNIFICANT CHANGE UP (ref 98–107)
CO2 SERPL-SCNC: 18 MMOL/L — LOW (ref 22–31)
CO2 SERPL-SCNC: 20 MMOL/L — LOW (ref 22–31)
CO2 SERPL-SCNC: 21 MMOL/L — LOW (ref 22–31)
CREAT SERPL-MCNC: 1.79 MG/DL — HIGH (ref 0.5–1.3)
CREAT SERPL-MCNC: 1.8 MG/DL — HIGH (ref 0.5–1.3)
CREAT SERPL-MCNC: 1.86 MG/DL — HIGH (ref 0.5–1.3)
GLUCOSE SERPL-MCNC: 138 MG/DL — HIGH (ref 70–99)
GLUCOSE SERPL-MCNC: 83 MG/DL — SIGNIFICANT CHANGE UP (ref 70–99)
GLUCOSE SERPL-MCNC: 87 MG/DL — SIGNIFICANT CHANGE UP (ref 70–99)
HCT VFR BLD CALC: 41.4 % — SIGNIFICANT CHANGE UP (ref 39–50)
HGB BLD-MCNC: 13 G/DL — SIGNIFICANT CHANGE UP (ref 13–17)
MAGNESIUM SERPL-MCNC: 2 MG/DL — SIGNIFICANT CHANGE UP (ref 1.6–2.6)
MCHC RBC-ENTMCNC: 26.4 PG — LOW (ref 27–34)
MCHC RBC-ENTMCNC: 31.4 % — LOW (ref 32–36)
MCV RBC AUTO: 84 FL — SIGNIFICANT CHANGE UP (ref 80–100)
NRBC # FLD: 0 K/UL — SIGNIFICANT CHANGE UP (ref 0–0)
PHOSPHATE SERPL-MCNC: 4.5 MG/DL — SIGNIFICANT CHANGE UP (ref 2.5–4.5)
PLATELET # BLD AUTO: 260 K/UL — SIGNIFICANT CHANGE UP (ref 150–400)
PMV BLD: 9.8 FL — SIGNIFICANT CHANGE UP (ref 7–13)
POTASSIUM SERPL-MCNC: 5 MMOL/L — SIGNIFICANT CHANGE UP (ref 3.5–5.3)
POTASSIUM SERPL-MCNC: 5.4 MMOL/L — HIGH (ref 3.5–5.3)
POTASSIUM SERPL-MCNC: 5.9 MMOL/L — HIGH (ref 3.5–5.3)
POTASSIUM SERPL-SCNC: 5 MMOL/L — SIGNIFICANT CHANGE UP (ref 3.5–5.3)
POTASSIUM SERPL-SCNC: 5.4 MMOL/L — HIGH (ref 3.5–5.3)
POTASSIUM SERPL-SCNC: 5.9 MMOL/L — HIGH (ref 3.5–5.3)
RBC # BLD: 4.93 M/UL — SIGNIFICANT CHANGE UP (ref 4.2–5.8)
RBC # FLD: 16.8 % — HIGH (ref 10.3–14.5)
SODIUM SERPL-SCNC: 132 MMOL/L — LOW (ref 135–145)
SODIUM SERPL-SCNC: 134 MMOL/L — LOW (ref 135–145)
SODIUM SERPL-SCNC: 134 MMOL/L — LOW (ref 135–145)
WBC # BLD: 7.33 K/UL — SIGNIFICANT CHANGE UP (ref 3.8–10.5)
WBC # FLD AUTO: 7.33 K/UL — SIGNIFICANT CHANGE UP (ref 3.8–10.5)

## 2020-12-01 PROCEDURE — 93306 TTE W/DOPPLER COMPLETE: CPT | Mod: 26

## 2020-12-01 RX ORDER — SODIUM ZIRCONIUM CYCLOSILICATE 10 G/10G
10 POWDER, FOR SUSPENSION ORAL ONCE
Refills: 0 | Status: COMPLETED | OUTPATIENT
Start: 2020-12-01 | End: 2020-12-01

## 2020-12-01 RX ORDER — INSULIN HUMAN 100 [IU]/ML
5 INJECTION, SOLUTION SUBCUTANEOUS ONCE
Refills: 0 | Status: COMPLETED | OUTPATIENT
Start: 2020-12-01 | End: 2020-12-01

## 2020-12-01 RX ORDER — DEXTROSE 50 % IN WATER 50 %
50 SYRINGE (ML) INTRAVENOUS ONCE
Refills: 0 | Status: COMPLETED | OUTPATIENT
Start: 2020-12-01 | End: 2020-12-01

## 2020-12-01 RX ADMIN — ATORVASTATIN CALCIUM 20 MILLIGRAM(S): 80 TABLET, FILM COATED ORAL at 21:54

## 2020-12-01 RX ADMIN — Medication 50 MILLILITER(S): at 09:51

## 2020-12-01 RX ADMIN — RANOLAZINE 1000 MILLIGRAM(S): 500 TABLET, FILM COATED, EXTENDED RELEASE ORAL at 21:54

## 2020-12-01 RX ADMIN — ISOSORBIDE MONONITRATE 30 MILLIGRAM(S): 60 TABLET, EXTENDED RELEASE ORAL at 11:24

## 2020-12-01 RX ADMIN — SODIUM ZIRCONIUM CYCLOSILICATE 10 GRAM(S): 10 POWDER, FOR SUSPENSION ORAL at 15:31

## 2020-12-01 RX ADMIN — Medication 81 MILLIGRAM(S): at 11:24

## 2020-12-01 RX ADMIN — CLOPIDOGREL BISULFATE 75 MILLIGRAM(S): 75 TABLET, FILM COATED ORAL at 11:24

## 2020-12-01 RX ADMIN — Medication 25 MILLIGRAM(S): at 09:52

## 2020-12-01 RX ADMIN — INSULIN HUMAN 5 UNIT(S): 100 INJECTION, SOLUTION SUBCUTANEOUS at 09:51

## 2020-12-01 RX ADMIN — Medication 25 MILLIGRAM(S): at 21:54

## 2020-12-01 RX ADMIN — AMLODIPINE BESYLATE 5 MILLIGRAM(S): 2.5 TABLET ORAL at 05:33

## 2020-12-01 RX ADMIN — PANTOPRAZOLE SODIUM 40 MILLIGRAM(S): 20 TABLET, DELAYED RELEASE ORAL at 05:33

## 2020-12-01 RX ADMIN — RANOLAZINE 1000 MILLIGRAM(S): 500 TABLET, FILM COATED, EXTENDED RELEASE ORAL at 09:52

## 2020-12-01 NOTE — CONSULT NOTE ADULT - ASSESSMENT
65y Male with history of HTN, CAD presents with CP. Nephrology consulted for elevated Scr.    1) CKD-3a: Patient with h/o CKD likely due to long standing HTN and renovascular disease. Baseline Scr appears to be 1.4-1.6 as per prior records which may have progressed. Renal function stable despite cardiac cath on 11/30 with 38 ml of contrast. UA bland without proteinuria. Prior renal US reviewed. Outpatient CKD work up. Avoid nephrotoxins. Monitor electrolytes.    2) HTN with CKD: BP controlled. Holding lisinopril given hyperkalemia. Monitor BP.    3) Hyperkalemia: Resolved s/p medical management. Change diet to low K and would give lokelma 10 grams PO X 1 dose. Monitor serum K.    4) Metabolic acidosis: Mild. Check AM VBG with lactate/ketones. Will consider addition of sodium bicarbonate pending results. Monitor CO2.

## 2020-12-01 NOTE — CONSULT NOTE ADULT - ATTENDING COMMENTS
[facility] NEPHROLOGY  Tejas Rowland M.D.  Ruben Byers D.O.  Payal Orona M.D.  Sola Rondon, MSN, ANP-C    Telephone: [phone]  Facsimile: [phone]    [address] [place] [address]

## 2020-12-01 NOTE — CONSULT NOTE ADULT - SUBJECTIVE AND OBJECTIVE BOX
[facility] NEPHROLOGY- CONSULTATION NOTE    65y Male with history of below presents with CP. Nephrology consulted for elevated Scr.    Patient admits to h/o CKD however does not follow with a nephrologist as an outpatient. Patient admits to long standing h/o HTN but no h/o DM. Patient denies any h/o nephrolithiasis, hepatitis, HIV, IVDA, tattoos, herbal medications or chronic NSAID use but admits to prior PRBC transfusion. Patient aware of history of hyperkalemia and on lisinopril as an outpatient.    Patient s/p cardiac cath on admission with stable renal function.    REVIEW OF SYSTEMS:  Gen: no changes in weight  HEENT: no rhinorrhea  Neck: no sore throat  Cards: + chest pain resolved  Resp: no dyspnea  GI: no nausea or vomiting or diarrhea  : no dysuria or hematuria  Vascular: no LE edema  Derm: no rashes  Neuro: no numbness/tingling    No Known Allergies      Home Medications Reviewed  Hospital Medications:   MEDICATIONS  (STANDING):  amLODIPine   Tablet 5 milliGRAM(s) Oral daily  aspirin enteric coated 81 milliGRAM(s) Oral daily  atorvastatin 20 milliGRAM(s) Oral at bedtime  clopidogrel Tablet 75 milliGRAM(s) Oral daily  isosorbide   mononitrate ER Tablet (IMDUR) 30 milliGRAM(s) Oral daily  metoprolol tartrate 25 milliGRAM(s) Oral two times a day  pantoprazole    Tablet 40 milliGRAM(s) Oral before breakfast  ranolazine 1000 milliGRAM(s) Oral two times a day      PAST MEDICAL & SURGICAL HISTORY:  Chronic gout of elbow, unspecified cause, unspecified laterality    HLD (hyperlipidemia)    CAD (coronary artery disease)    HTN (hypertension)    S/P CABG x 3      H/O heart artery stent  2016        FAMILY HISTORY:  No pertinent family history in first degree relatives        SOCIAL HISTORY:  Denies toxic substance use     VITALS:  T(F): 98.1 (20 @ 11:22), Max: 98.5 (20 @ 20:50)  HR: 75 (20 @ 11:22)  BP: 122/70 (20 @ 11:22)  RR: 17 (20 @ 11:22)  SpO2: 100% (20 @ 11:22)  Wt(kg): --        PHYSICAL EXAM:  Gen: NAD, calm  HEENT: MMM  Neck: no JVD  Cards: RRR, +S1/S2, no M/G/R  Resp: CTA B/L  GI: soft, NT/ND, NABS  : no CVA tenderness  Vascular: no LE edema B/L  Derm: no rashes  Neuro: non-focal    LABS:      134<L>  |  104  |  24<H>  ----------------------------<  138<H>  5.0   |  20<L>  |  1.86<H>    Ca    9.9      01 Dec 2020 11:00  Phos  4.5     12  Mg     2.0         TPro  6.9  /  Alb  4.0  /  TBili  0.3  /  DBili      /  AST  15  /  ALT  9   /  AlkPhos  60      Creatinine Trend: 1.86 <--, 1.80 <--, 1.79 <--, 1.81 <--                        13.0   7.33  )-----------( 260      ( 01 Dec 2020 06:00 )             41.4     Urine Studies:  Urinalysis Basic - ( 2020 04:50 )    Color: LIGHT YELLOW / Appearance: CLEAR / S.011 / pH: 7.0  Gluc: NEGATIVE / Ketone: NEGATIVE  / Bili: NEGATIVE / Urobili: NORMAL   Blood: NEGATIVE / Protein: NEGATIVE / Nitrite: NEGATIVE   Leuk Esterase: NEGATIVE / RBC:  / WBC    Sq Epi:  / Non Sq Epi:  / Bacteria:           RADIOLOGY & ADDITIONAL STUDIES:    < from: Xray Chest 1 View AP/PA (20 @ 02:02) >  IMPRESSION:  Clear lungs.    < end of copied text >      < from: US Renal (18 @ 17:53) >  1.  Echogenic kidneys can occur in the setting of medical renal disease.  2.  No collecting system dilatation.  3.  Bilateral renal cysts.    < end of copied text >

## 2020-12-01 NOTE — PROGRESS NOTE ADULT - SUBJECTIVE AND OBJECTIVE BOX
CARDIOLOGY FOLLOW UP - Dr. Monahan    CC no cp or sob   hyperkalemia noted, no changes on ecg  pending renal eval   s/p cath yesterday       PHYSICAL EXAM:  T(C): 36.7 (12-01-20 @ 11:22), Max: 36.9 (11-30-20 @ 20:50)  HR: 75 (12-01-20 @ 11:22) (62 - 75)  BP: 122/70 (12-01-20 @ 11:22) (122/70 - 137/82)  RR: 17 (12-01-20 @ 11:22) (17 - 18)  SpO2: 100% (12-01-20 @ 11:22) (98% - 100%)  Wt(kg): --  I&O's Summary      Appearance: Normal	  Cardiovascular: Normal S1 S2,RRR, No JVD, No murmurs  Respiratory: Lungs clear to auscultation	  Gastrointestinal:  Soft, Non-tender, + BS	  Extremities: Normal range of motion, No clubbing, cyanosis or edema  right groin cdi no hematoma; + pulses bl le     Home Medications:  amLODIPine 5 mg oral tablet: 1 tab(s) orally once a day (30 Nov 2020 11:18)  atorvastatin 80 mg oral tablet: 1 tab(s) orally once a day (30 Nov 2020 11:18)  Ecotrin Adult Low Strength 81 mg oral delayed release tablet: 1 tab(s) orally once a day (30 Nov 2020 11:18)  isosorbide mononitrate 30 mg oral tablet, extended release: 1 tab(s) orally once a day (30 Nov 2020 11:18)  lisinopril 5 mg oral tablet: 1 tab(s) orally once a day (30 Nov 2020 11:18)  Metoprolol Succinate ER 50 mg oral tablet, extended release: 1 tab(s) orally once a day (30 Nov 2020 11:18)  ranolazine 1000 mg oral tablet, extended release: 1 tab(s) orally 2 times a day (30 Nov 2020 11:18)      MEDICATIONS  (STANDING):  amLODIPine   Tablet 5 milliGRAM(s) Oral daily  aspirin enteric coated 81 milliGRAM(s) Oral daily  atorvastatin 20 milliGRAM(s) Oral at bedtime  clopidogrel Tablet 75 milliGRAM(s) Oral daily  isosorbide   mononitrate ER Tablet (IMDUR) 30 milliGRAM(s) Oral daily  metoprolol tartrate 25 milliGRAM(s) Oral two times a day  pantoprazole    Tablet 40 milliGRAM(s) Oral before breakfast  ranolazine 1000 milliGRAM(s) Oral two times a day      TELEMETRY: 	    ECG:  	  RADIOLOGY:   DIAGNOSTIC TESTING:  [ ] Echocardiogram:    [ x]  Catheterization:  < from: Cardiac Cath Lab - Adult (11.30.20 @ 16:50) >  DIAGNOSTIC IMPRESSIONS: Recurrent chest pain refractory to optimal medical  therapy with history of CAD, s/p CABG, PCI. Cath with patent proximal LAD  and prox-mid RCA stents. Patent SVG to OM2. No new obstructive native or  graft disease to account for chest pain. LVEDP normal. No LV gram due to  CKD.  DIAGNOSTIC RECOMMENDATIONS: Continue medical management with asa, plavix.  Check ECHO for LV and valve function.  INTERVENTIONAL IMPRESSIONS: Recurrent chest pain refractory to optimal  medical therapy with history of CAD, s/p CABG, PCI. Cath with patent  proximal LAD and prox-mid RCA stents. Patent SVG to OM2. No new  obstructive native or graft disease to account for chest pain. LVEDP  normal. No LV gram due to CKD.  INTERVENTIONAL RECOMMENDATIONS: Continue medical management with asa,  plavix. Check ECHO for LV and valve function.  Prepared and signed by  Chema Monahan M.D.    < end of copied text >    [ ] Stress Test:      OTHER: 	    LABS:	 	    Troponin T, High Sensitivity: 12 ng/L [<6 - 14] (11-30 @ 04:50)  Troponin T, High Sensitivity: 13 ng/L [<6 - 14] (11-30 @ 02:10)                          13.0   7.33  )-----------( 260      ( 01 Dec 2020 06:00 )             41.4     12-01    134<L>  |  102  |  24<H>  ----------------------------<  83  5.9<H>   |  21<L>  |  1.80<H>    Ca    9.8      01 Dec 2020 08:28  Phos  4.5     12-01  Mg     2.0     12-01    TPro  6.9  /  Alb  4.0  /  TBili  0.3  /  DBili  x   /  AST  15  /  ALT  9   /  AlkPhos  60  11-30    PT/INR - ( 30 Nov 2020 06:30 )   PT: 12.2 SEC;   INR: 1.07          PTT - ( 30 Nov 2020 12:50 )  PTT:196.2 SEC         CARDIOLOGY FOLLOW UP - Dr. Monahan    CC no cp or sob   hyperkalemia, hyponatremia noted, no changes on ecg  pending renal eval   s/p cath yesterday       PHYSICAL EXAM:  T(C): 36.7 (12-01-20 @ 11:22), Max: 36.9 (11-30-20 @ 20:50)  HR: 75 (12-01-20 @ 11:22) (62 - 75)  BP: 122/70 (12-01-20 @ 11:22) (122/70 - 137/82)  RR: 17 (12-01-20 @ 11:22) (17 - 18)  SpO2: 100% (12-01-20 @ 11:22) (98% - 100%)  Wt(kg): --  I&O's Summary      Appearance: Normal	  Cardiovascular: Normal S1 S2,RRR, No JVD, No murmurs  Respiratory: Lungs clear to auscultation	  Gastrointestinal:  Soft, Non-tender, + BS	  Extremities: Normal range of motion, No clubbing, cyanosis or edema  right groin cdi no hematoma; + pulses bl le     Home Medications:  amLODIPine 5 mg oral tablet: 1 tab(s) orally once a day (30 Nov 2020 11:18)  atorvastatin 80 mg oral tablet: 1 tab(s) orally once a day (30 Nov 2020 11:18)  Ecotrin Adult Low Strength 81 mg oral delayed release tablet: 1 tab(s) orally once a day (30 Nov 2020 11:18)  isosorbide mononitrate 30 mg oral tablet, extended release: 1 tab(s) orally once a day (30 Nov 2020 11:18)  lisinopril 5 mg oral tablet: 1 tab(s) orally once a day (30 Nov 2020 11:18)  Metoprolol Succinate ER 50 mg oral tablet, extended release: 1 tab(s) orally once a day (30 Nov 2020 11:18)  ranolazine 1000 mg oral tablet, extended release: 1 tab(s) orally 2 times a day (30 Nov 2020 11:18)      MEDICATIONS  (STANDING):  amLODIPine   Tablet 5 milliGRAM(s) Oral daily  aspirin enteric coated 81 milliGRAM(s) Oral daily  atorvastatin 20 milliGRAM(s) Oral at bedtime  clopidogrel Tablet 75 milliGRAM(s) Oral daily  isosorbide   mononitrate ER Tablet (IMDUR) 30 milliGRAM(s) Oral daily  metoprolol tartrate 25 milliGRAM(s) Oral two times a day  pantoprazole    Tablet 40 milliGRAM(s) Oral before breakfast  ranolazine 1000 milliGRAM(s) Oral two times a day      TELEMETRY: 	    ECG:  	  RADIOLOGY:   DIAGNOSTIC TESTING:  [ ] Echocardiogram:    [ x]  Catheterization:  < from: Cardiac Cath Lab - Adult (11.30.20 @ 16:50) >  DIAGNOSTIC IMPRESSIONS: Recurrent chest pain refractory to optimal medical  therapy with history of CAD, s/p CABG, PCI. Cath with patent proximal LAD  and prox-mid RCA stents. Patent SVG to OM2. No new obstructive native or  graft disease to account for chest pain. LVEDP normal. No LV gram due to  CKD.  DIAGNOSTIC RECOMMENDATIONS: Continue medical management with asa, plavix.  Check ECHO for LV and valve function.  INTERVENTIONAL IMPRESSIONS: Recurrent chest pain refractory to optimal  medical therapy with history of CAD, s/p CABG, PCI. Cath with patent  proximal LAD and prox-mid RCA stents. Patent SVG to OM2. No new  obstructive native or graft disease to account for chest pain. LVEDP  normal. No LV gram due to CKD.  INTERVENTIONAL RECOMMENDATIONS: Continue medical management with asa,  plavix. Check ECHO for LV and valve function.  Prepared and signed by  Chema Monahan M.D.    < end of copied text >    [ ] Stress Test:      OTHER: 	    LABS:	 	    Troponin T, High Sensitivity: 12 ng/L [<6 - 14] (11-30 @ 04:50)  Troponin T, High Sensitivity: 13 ng/L [<6 - 14] (11-30 @ 02:10)                          13.0   7.33  )-----------( 260      ( 01 Dec 2020 06:00 )             41.4     12-01    134<L>  |  102  |  24<H>  ----------------------------<  83  5.9<H>   |  21<L>  |  1.80<H>    Ca    9.8      01 Dec 2020 08:28  Phos  4.5     12-01  Mg     2.0     12-01    TPro  6.9  /  Alb  4.0  /  TBili  0.3  /  DBili  x   /  AST  15  /  ALT  9   /  AlkPhos  60  11-30    PT/INR - ( 30 Nov 2020 06:30 )   PT: 12.2 SEC;   INR: 1.07          PTT - ( 30 Nov 2020 12:50 )  PTT:196.2 SEC

## 2020-12-01 NOTE — CONSULT NOTE ADULT - ASSESSMENT
Patient is a 65y old  Male who presents with a chief complaint of chest pain (01 Dec 2020 13:30).      Chest pain:    Tele  Cardio f/up noted.  ECHO  ASA/Plavix/Ranexa  S/p C. Cath    HTN:  Cw amlodipine/Metoprolol    JOURDAN on CKD III:    BMP  Nephro eval noted.

## 2020-12-01 NOTE — PROGRESS NOTE ADULT - ASSESSMENT
Cath Sep 2020, cath revealed patent cardiac stents  as well as patent SVG-OM  Cath 5/9/2018: PCI to prox LAD     a/p  64 yo m h/o cabg 2015, multiple stents,  gout, ckd, PUD, hydrocephalus s/p ventriculostomy presents with left-sided chest pain     1. Atypical chest pain , CAD s/p PCI, CABG   -reproducible on exam , now resolved   -HS trop negative (13-12) no acs on ecg ,no decomp CHF on exam   -s/p plavix load,  hep gtt,   s/p Cath yesterday   Cath with patent proximal LAD and prox-mid RCA stents. Patent SVG to OM2. No new obstructive native or graft disease to account for chest pain.   -c/w asa and plavix,  imdur and Ranexa   -check echo   -c/w BB, statin     dvt ppx    Cath Sep 2020, cath revealed patent cardiac stents  as well as patent SVG-OM  Cath 5/9/2018: PCI to prox LAD     a/p  64 yo m h/o cabg 2015, multiple stents,  gout, ckd, PUD, hydrocephalus s/p ventriculostomy presents with left-sided chest pain     1. Atypical chest pain , CAD s/p PCI, CABG   -reproducible on exam , now resolved   -HS trop negative (13-12) no acs on ecg ,no decomp CHF on exam   -s/p plavix load,  hep gtt,   s/p Cath yesterday   Cath with patent proximal LAD and prox-mid RCA stents. Patent SVG to OM2. No new obstructive native or graft disease to account for chest pain.   -c/w asa and plavix,  imdur and Ranexa   -check echo   -c/w BB, statin     2. hyperkalemia, hyponatremia  s/p insulin/dextrose  -pending repeat BMP  -Renal Eval   -repeat ecg with no ischemic changes   dvt ppx

## 2020-12-01 NOTE — CONSULT NOTE ADULT - SUBJECTIVE AND OBJECTIVE BOX
Patient is a 65y old  Male who presents with a chief complaint of chest pain (01 Dec 2020 13:30)      HPI:  prior visits to [facility] in 2020 under MRN [id number]  64 yo m h/o cabg 2015, multiple stents including proximal and  mid LAD currently on DAPT, gout, ckd, PUD, hydrocephalus s/p ventriculostomy at [facility]. Presents with left-sided chest pain that woke him from sleep. CP diminished but still persists. EKG unremarkable, HS trop x 2 neg. Pt denies cough, fever, sob, lightheadedness, nausea. Presented with CP in Sep 2020, underwent cath that revealed patent cardiac stents  as well as patent SVG-OM   (2020 06:23)      PAST MEDICAL & SURGICAL HISTORY:  Chronic gout of elbow, unspecified cause, unspecified laterality    HLD (hyperlipidemia)    CAD (coronary artery disease)    HTN (hypertension)    S/P CABG x 3      H/O heart artery stent  2016        Review of Systems:   CONSTITUTIONAL: No fever,  or fatigue  NECK: No pain or stiffness  RESPIRATORY: No cough, wheezing, chills or hemoptysis; No shortness of breath  CARDIOVASCULAR: No chest pain, palpitations, dizziness, or leg swelling  GASTROINTESTINAL: No abdominal or epigastric pain.   NEUROLOGICAL: No headaches,           Allergies    No Known Allergies    Intolerances        Social History:     FAMILY HISTORY:  No pertinent family history in first degree relatives        MEDICATIONS  (STANDING):  amLODIPine   Tablet 5 milliGRAM(s) Oral daily  aspirin enteric coated 81 milliGRAM(s) Oral daily  atorvastatin 20 milliGRAM(s) Oral at bedtime  clopidogrel Tablet 75 milliGRAM(s) Oral daily  isosorbide   mononitrate ER Tablet (IMDUR) 30 milliGRAM(s) Oral daily  metoprolol tartrate 25 milliGRAM(s) Oral two times a day  pantoprazole    Tablet 40 milliGRAM(s) Oral before breakfast  ranolazine 1000 milliGRAM(s) Oral two times a day    MEDICATIONS  (PRN):  acetaminophen   Tablet .. 650 milliGRAM(s) Oral every 6 hours PRN Temp greater or equal to 38C (100.4F), Mild Pain (1 - 3)      CAPILLARY BLOOD GLUCOSE      POCT Blood Glucose.: 117 mg/dL (01 Dec 2020 09:51)    I&O's Summary      PHYSICAL EXAM:      NECK: Supple, No JVD  CHEST/LUNG: Clear to auscultation bilaterally; No wheezing.  HEART: Regular rate and rhythm; No murmurs, rubs, or gallops  ABDOMEN: Soft, Nontender, Nondistended; Bowel sounds present  EXTREMITIES:  2+ Peripheral Pulses, No edema  NEUROLOGY: AAOx 3      LABS:                        13.0   7.33  )-----------( 260      ( 01 Dec 2020 06:00 )             41.4     12-    134<L>  |  104  |  24<H>  ----------------------------<  138<H>  5.0   |  20<L>  |  1.86<H>    Ca    9.9      01 Dec 2020 11:00  Phos  4.5     12  Mg     2.0     12    TPro  6.9  /  Alb  4.0  /  TBili  0.3  /  DBili  x   /  AST  15  /  ALT  9   /  AlkPhos  60  11-30    PT/INR - ( 2020 06:30 )   PT: 12.2 SEC;   INR: 1.07          PTT - ( 2020 12:50 )  PTT:196.2 SEC      Urinalysis Basic - ( 2020 04:50 )    Color: LIGHT YELLOW / Appearance: CLEAR / S.011 / pH: 7.0  Gluc: NEGATIVE / Ketone: NEGATIVE  / Bili: NEGATIVE / Urobili: NORMAL   Blood: NEGATIVE / Protein: NEGATIVE / Nitrite: NEGATIVE   Leuk Esterase: NEGATIVE / RBC: x / WBC x   Sq Epi: x / Non Sq Epi: x / Bacteria: x      CAPILLARY BLOOD GLUCOSE      POCT Blood Glucose.: 117 mg/dL (01 Dec 2020 09:51)                RADIOLOGY & ADDITIONAL TESTS:    Imaging Personally Reviewed:    Consultant(s) Notes Reviewed:      Care Discussed with Consultants/Other Providers:    Thanks for consult. Will follow.

## 2020-12-02 ENCOUNTER — TRANSCRIPTION ENCOUNTER (OUTPATIENT)
Age: 65
End: 2020-12-02

## 2020-12-02 VITALS
SYSTOLIC BLOOD PRESSURE: 112 MMHG | RESPIRATION RATE: 17 BRPM | DIASTOLIC BLOOD PRESSURE: 68 MMHG | TEMPERATURE: 98 F | OXYGEN SATURATION: 99 % | HEART RATE: 73 BPM

## 2020-12-02 LAB
ANION GAP SERPL CALC-SCNC: 13 MMO/L — SIGNIFICANT CHANGE UP (ref 7–14)
B-OH-BUTYR SERPL-SCNC: 0 MMOL/L — SIGNIFICANT CHANGE UP (ref 0–0.4)
BASE EXCESS BLDV CALC-SCNC: -4 MMOL/L — SIGNIFICANT CHANGE UP
BUN SERPL-MCNC: 29 MG/DL — HIGH (ref 7–23)
CALCIUM SERPL-MCNC: 9.2 MG/DL — SIGNIFICANT CHANGE UP (ref 8.4–10.5)
CHLORIDE SERPL-SCNC: 103 MMOL/L — SIGNIFICANT CHANGE UP (ref 98–107)
CO2 SERPL-SCNC: 19 MMOL/L — LOW (ref 22–31)
CREAT SERPL-MCNC: 1.94 MG/DL — HIGH (ref 0.5–1.3)
GLUCOSE SERPL-MCNC: 86 MG/DL — SIGNIFICANT CHANGE UP (ref 70–99)
HCO3 BLDV-SCNC: 20 MMOL/L — SIGNIFICANT CHANGE UP (ref 20–27)
HCT VFR BLD CALC: 40.9 % — SIGNIFICANT CHANGE UP (ref 39–50)
HGB BLD-MCNC: 12.9 G/DL — LOW (ref 13–17)
LACTATE BLDV-MCNC: 2 MMOL/L — SIGNIFICANT CHANGE UP (ref 0.5–2)
LACTATE SERPL-SCNC: 1.4 MMOL/L — SIGNIFICANT CHANGE UP (ref 0.5–2)
MCHC RBC-ENTMCNC: 26.5 PG — LOW (ref 27–34)
MCHC RBC-ENTMCNC: 31.5 % — LOW (ref 32–36)
MCV RBC AUTO: 84 FL — SIGNIFICANT CHANGE UP (ref 80–100)
NRBC # FLD: 0 K/UL — SIGNIFICANT CHANGE UP (ref 0–0)
PCO2 BLDV: 43 MMHG — SIGNIFICANT CHANGE UP (ref 41–51)
PH BLDV: 7.31 PH — LOW (ref 7.32–7.43)
PLATELET # BLD AUTO: 270 K/UL — SIGNIFICANT CHANGE UP (ref 150–400)
PMV BLD: 9.9 FL — SIGNIFICANT CHANGE UP (ref 7–13)
PO2 BLDV: 41 MMHG — HIGH (ref 35–40)
POTASSIUM SERPL-MCNC: 4.7 MMOL/L — SIGNIFICANT CHANGE UP (ref 3.5–5.3)
POTASSIUM SERPL-SCNC: 4.7 MMOL/L — SIGNIFICANT CHANGE UP (ref 3.5–5.3)
RBC # BLD: 4.87 M/UL — SIGNIFICANT CHANGE UP (ref 4.2–5.8)
RBC # FLD: 16.6 % — HIGH (ref 10.3–14.5)
SAO2 % BLDV: 64.8 % — SIGNIFICANT CHANGE UP (ref 60–85)
SODIUM SERPL-SCNC: 135 MMOL/L — SIGNIFICANT CHANGE UP (ref 135–145)
WBC # BLD: 8.25 K/UL — SIGNIFICANT CHANGE UP (ref 3.8–10.5)
WBC # FLD AUTO: 8.25 K/UL — SIGNIFICANT CHANGE UP (ref 3.8–10.5)

## 2020-12-02 RX ORDER — SODIUM BICARBONATE 1 MEQ/ML
650 SYRINGE (ML) INTRAVENOUS
Refills: 0 | Status: DISCONTINUED | OUTPATIENT
Start: 2020-12-02 | End: 2020-12-02

## 2020-12-02 RX ORDER — SODIUM BICARBONATE 1 MEQ/ML
1 SYRINGE (ML) INTRAVENOUS
Qty: 60 | Refills: 0
Start: 2020-12-02 | End: 2020-12-31

## 2020-12-02 RX ORDER — PANTOPRAZOLE SODIUM 20 MG/1
1 TABLET, DELAYED RELEASE ORAL
Qty: 30 | Refills: 0
Start: 2020-12-02 | End: 2020-12-31

## 2020-12-02 RX ORDER — LISINOPRIL 2.5 MG/1
1 TABLET ORAL
Qty: 0 | Refills: 0 | DISCHARGE

## 2020-12-02 RX ADMIN — AMLODIPINE BESYLATE 5 MILLIGRAM(S): 2.5 TABLET ORAL at 05:36

## 2020-12-02 RX ADMIN — CLOPIDOGREL BISULFATE 75 MILLIGRAM(S): 75 TABLET, FILM COATED ORAL at 12:51

## 2020-12-02 RX ADMIN — Medication 650 MILLIGRAM(S): at 12:58

## 2020-12-02 RX ADMIN — Medication 25 MILLIGRAM(S): at 12:51

## 2020-12-02 RX ADMIN — Medication 81 MILLIGRAM(S): at 12:51

## 2020-12-02 RX ADMIN — ISOSORBIDE MONONITRATE 30 MILLIGRAM(S): 60 TABLET, EXTENDED RELEASE ORAL at 12:51

## 2020-12-02 RX ADMIN — RANOLAZINE 1000 MILLIGRAM(S): 500 TABLET, FILM COATED, EXTENDED RELEASE ORAL at 12:51

## 2020-12-02 RX ADMIN — PANTOPRAZOLE SODIUM 40 MILLIGRAM(S): 20 TABLET, DELAYED RELEASE ORAL at 05:36

## 2020-12-02 NOTE — PROGRESS NOTE ADULT - ATTENDING COMMENTS
[facility] NEPHROLOGY  Tejas Rowland M.D.  Ruben Byers D.O.  Payal Orona M.D.  Sola Rondon, MSN, ANP-C    Telephone: [phone]  Facsimile: [phone]    [address] [place] [address]
Patient seen and examined, agree with the above assessment and plan by GERI Darnell.  CV stable  s/p cath-results as above  hyperkalemia resolved  renal followup  DCP if cleared by renal  cont current mgmt
Patient seen and examined, agree with the above assessment and plan by GERI Darnell.  CV stable  s/p cath-results as above  electrolyte abnl noted  renal eval called  cont current mgmt

## 2020-12-02 NOTE — DISCHARGE NOTE PROVIDER - PROVIDER TOKENS
FREE:[LAST:[Primary care doctor],PHONE:[(   )    -],FAX:[(   )    -],FOLLOWUP:[1 week],ESTABLISHEDPATIENT:[T]],PROVIDER:[TOKEN:[[id number]],FOLLOWUP:[1 week]]

## 2020-12-02 NOTE — PROGRESS NOTE ADULT - ASSESSMENT
65y Male with history of HTN, CAD presents with CP. Nephrology consulted for elevated Scr.    1) JOURDAN: likely hemodynamically mediated. Recent baseline Scr unknown and suspect patient may have progressed since most recent labs. UA bland and renal US negative for obstruction. Renal function stable despite cardiac cath on 11/30 with 38 ml of contrast. Avoid nephrotoxins.    2) CKD-3a: Patient with h/o CKD likely due to long standing HTN and renovascular disease. Prior baseline Scr appears to be 1.4-1.6 as per labs in 2019. UA bland without proteinuria. Prior renal US reviewed. Outpatient CKD work up. Monitor electrolytes.    3) HTN with CKD: BP controlled. Holding lisinopril given hyperkalemia on admission. Monitor BP.    4) Hyperkalemia: Resolved s/p medical management. Continue with low potassium diet. Monitor serum K.    5) Metabolic acidosis: Mild with respiratory and metabolic acidosis. Start sodium bicarbonate 650 mg twice daily. Monitor CO2.

## 2020-12-02 NOTE — DISCHARGE NOTE PROVIDER - NSDCMRMEDTOKEN_GEN_ALL_CORE_FT
amLODIPine 5 mg oral tablet: 1 tab(s) orally once a day  atorvastatin 80 mg oral tablet: 1 tab(s) orally once a day  Ecotrin Adult Low Strength 81 mg oral delayed release tablet: 1 tab(s) orally once a day  isosorbide mononitrate 30 mg oral tablet, extended release: 1 tab(s) orally once a day  Metoprolol Succinate ER 50 mg oral tablet, extended release: 1 tab(s) orally once a day  Plavix 75 mg oral tablet: 1 tab(s) orally once a day  ranolazine 1000 mg oral tablet, extended release: 1 tab(s) orally 2 times a day   amLODIPine 5 mg oral tablet: 1 tab(s) orally once a day  atorvastatin 80 mg oral tablet: 1 tab(s) orally once a day  Ecotrin Adult Low Strength 81 mg oral delayed release tablet: 1 tab(s) orally once a day  isosorbide mononitrate 30 mg oral tablet, extended release: 1 tab(s) orally once a day  Metoprolol Succinate ER 50 mg oral tablet, extended release: 1 tab(s) orally once a day  pantoprazole 40 mg oral delayed release tablet: 1 tab(s) orally once a day (before a meal)  Plavix 75 mg oral tablet: 1 tab(s) orally once a day  ranolazine 1000 mg oral tablet, extended release: 1 tab(s) orally 2 times a day  sodium bicarbonate 650 mg oral tablet: 1 tab(s) orally 2 times a day

## 2020-12-02 NOTE — DISCHARGE NOTE PROVIDER - NSDCCPCAREPLAN_GEN_ALL_CORE_FT
PRINCIPAL DISCHARGE DIAGNOSIS  Diagnosis: Chest pain in adult  Assessment and Plan of Treatment: You were admitted with complaints of chest pain.  You had an echocardiogram which showed your heart is functioning normally and you had an angiogram which did not show any blockages.      SECONDARY DISCHARGE DIAGNOSES  Diagnosis: Hyperkalemia  Assessment and Plan of Treatment: Your potassium level was elevated on admission, follow up with the kidney doctor Dr. Byers for further monitoring.    Diagnosis: Acute kidney injury  Assessment and Plan of Treatment: You had some injury to your kidneys and were seen by the kidney doctor. Take your sodium bicarbonate as ordered and follow up with Dr. Byers the kidney doctor.  Avoid medications that can affect your kidneys like naprosyn (aleve), ibuprofen (motrin).    Diagnosis: Coronary artery disease  Assessment and Plan of Treatment: Continue to take your aspirin and plavix as ordered.  follow up with your cardioligist for further monitoring.    Diagnosis: S/P coronary angiogram  Assessment and Plan of Treatment: No heavy lifting x one week. No strenuous activity x 3 weeks. Monitor site of procedure and notify your doctor for any redness, swelling, discharge. No driving x 24 hours. You may shower but no baths or swimming x one week.

## 2020-12-02 NOTE — DISCHARGE NOTE PROVIDER - CARE PROVIDER_API CALL
Primary care doctor,   Phone: (   )    -  Fax: (   )    -  Established Patient  Follow Up Time: 1 week    Ruben Byers  INTERNAL MEDICINE  [address] [place] [address]  Phone: [phone]  Fax: [phone]  Follow Up Time: 1 week

## 2020-12-02 NOTE — PROGRESS NOTE ADULT - SUBJECTIVE AND OBJECTIVE BOX
CARDIOLOGY FOLLOW UP - Dr. Monahan    CC no cp or sob         PHYSICAL EXAM:  T(C): 36.5 (12-02-20 @ 05:35), Max: 36.8 (12-01-20 @ 17:42)  HR: 70 (12-02-20 @ 05:35) (70 - 83)  BP: 126/83 (12-02-20 @ 05:35) (118/67 - 126/83)  RR: 17 (12-02-20 @ 05:35) (17 - 18)  SpO2: 100% (12-02-20 @ 05:35) (100% - 100%)  Wt(kg): --  I&O's Summary      Appearance: Normal	  Cardiovascular: Normal S1 S2,RRR, No JVD, No murmurs  Respiratory: Lungs clear to auscultation	  Gastrointestinal:  Soft, Non-tender, + BS	  Extremities: Normal range of motion, No clubbing, cyanosis or edema      Home Medications:  amLODIPine 5 mg oral tablet: 1 tab(s) orally once a day (30 Nov 2020 11:18)  atorvastatin 80 mg oral tablet: 1 tab(s) orally once a day (30 Nov 2020 11:18)  Ecotrin Adult Low Strength 81 mg oral delayed release tablet: 1 tab(s) orally once a day (30 Nov 2020 11:18)  isosorbide mononitrate 30 mg oral tablet, extended release: 1 tab(s) orally once a day (30 Nov 2020 11:18)  lisinopril 5 mg oral tablet: 1 tab(s) orally once a day (30 Nov 2020 11:18)  Metoprolol Succinate ER 50 mg oral tablet, extended release: 1 tab(s) orally once a day (30 Nov 2020 11:18)  ranolazine 1000 mg oral tablet, extended release: 1 tab(s) orally 2 times a day (30 Nov 2020 11:18)      MEDICATIONS  (STANDING):  amLODIPine   Tablet 5 milliGRAM(s) Oral daily  aspirin enteric coated 81 milliGRAM(s) Oral daily  atorvastatin 20 milliGRAM(s) Oral at bedtime  clopidogrel Tablet 75 milliGRAM(s) Oral daily  isosorbide   mononitrate ER Tablet (IMDUR) 30 milliGRAM(s) Oral daily  metoprolol tartrate 25 milliGRAM(s) Oral two times a day  pantoprazole    Tablet 40 milliGRAM(s) Oral before breakfast  ranolazine 1000 milliGRAM(s) Oral two times a day  sodium bicarbonate 650 milliGRAM(s) Oral two times a day      TELEMETRY: nsr  	    ECG:  	  RADIOLOGY:   DIAGNOSTIC TESTING:  [ ] Echocardiogram:    < from: Transthoracic Echocardiogram (12.01.20 @ 19:00) >  Ejection Fraction (Teicholtz): 63 %  ------------------------------------------------------------------------  OBSERVATIONS:  Mitral Valve: Mitral annular calcification, otherwise  normal mitral valve. Minimal mitral regurgitation.  Aortic Root: Normal aortic root.  Aortic Valve: Aortic valve leaflet morphology not well  visualized. Mild aortic regurgitation.  Left Atrium: Normal left atrium.  LA volume index = 26  cc/m2.  Left Ventricle: Endocardium not well visualized; grossly  normal left ventricular systolic function. Normal left  ventricular internal dimensionsand wall thicknesses.  Right Heart: Normal right atrium. The right ventricle is  not well visualized; grossly normal right ventricular  systolic function. Normal tricuspid valve. Mild tricuspid  regurgitation. Normal pulmonic valve.  Pericardium/PleuraNormal pericardium with no pericardial  effusion.  ------------------------------------------------------------------------  CONCLUSIONS:  1. Mitral annular calcification, otherwise normal mitral  valve. Minimal mitral regurgitation.  2. Aortic valve leaflet morphology not well visualized.  Mild aortic regurgitation.  3. Normal left ventricular internal dimensions and wall  thicknesses.  4. Endocardium not well visualized; grossly normal left  ventricular systolic function.  5. The right ventricle is not well visualized; grossly  normal right ventricular systolic function.  ------------------------------------------------------------------------  Confirmed on  12/1/2020 - 19:55:06 by Chema Jauregui M.D.  ------------------------------------------------------------------------    < end of copied text >  [ ]  Catheterization:  [ ] Stress Test:    OTHER: 	    LABS:	 	    Troponin T, High Sensitivity: 12 ng/L [<6 - 14] (11-30 @ 04:50)  Troponin T, High Sensitivity: 13 ng/L [<6 - 14] (11-30 @ 02:10)                          12.9   8.25  )-----------( 270      ( 02 Dec 2020 06:02 )             40.9     12-02    135  |  103  |  29<H>  ----------------------------<  86  4.7   |  19<L>  |  1.94<H>    Ca    9.2      02 Dec 2020 05:18  Phos  4.5     12-01  Mg     2.0     12-01      PTT - ( 30 Nov 2020 12:50 )  PTT:196.2 SEC

## 2020-12-02 NOTE — DISCHARGE NOTE PROVIDER - HOSPITAL COURSE
65y Male with history of HTN, CAD presents with CP, admitted to telemetry to rule out ACS.    Atypical chest pain , CAD s/p PCI, CABG   -reproducible on exam , now resolved   -HS trop negative (13-12) no acs on ecg ,no decomp CHF on exam   -s/p plavix load,  hep gtt,   s/p Cath with patent proximal LAD and prox-mid RCA stents. Patent SVG to OM2. No new obstructive native or graft disease to account for chest pain.   -c/w asa and plavix,  imdur and Ranexa   -echo  grossly nl lV sys fx ef 63%  -c/w BB, statin     Hyperkalemia, hyponatremia  s/p insulin/dextrose  - K, Na improving   -repeat ecg with no ischemic changes   -renal f.u noted  started sodium bicarbonate 650 mg twice daily.  -JOURDAN likely hemodynamically mediated.   - renal US negative for obstruction. Renal function stable despite cardiac cath on 11/30 with 38 ml of contrast. Avoid nephrotoxins.    HTN   -BP controlled  - Holding lisinopril given hyperkalemia on admission    n ____ patient medically clear for discharge home with outpatient follow up     65y Male with history of HTN, CAD presents with CP, admitted to telemetry to rule out ACS.    Atypical chest pain , CAD s/p PCI, CABG   -reproducible on exam , now resolved   -HS trop negative (13-12) no acs on ecg ,no decomp CHF on exam   -s/p plavix load,  hep gtt,   s/p Cath with patent proximal LAD and prox-mid RCA stents. Patent SVG to OM2. No new obstructive native or graft disease to account for chest pain.   -c/w asa and plavix,  imdur and Ranexa   -echo  grossly nl lV sys fx ef 63%  -c/w BB, statin     Hyperkalemia, hyponatremia  s/p insulin/dextrose  - K, Na improving   -repeat ecg with no ischemic changes   -renal f.u noted  started sodium bicarbonate 650 mg twice daily.  -JOURDAN likely hemodynamically mediated.   - renal US negative for obstruction. Renal function stable despite cardiac cath on 11/30 with 38 ml of contrast. Avoid nephrotoxins.    HTN   -BP controlled  - Holding lisinopril given hyperkalemia on admission    On 12/2/2020 patient medically clear for discharge home with outpatient follow up by Dr. Monahan

## 2020-12-02 NOTE — PROGRESS NOTE ADULT - SUBJECTIVE AND OBJECTIVE BOX
[facility] NEPHROLOGY- PROGRESS NOTE    65y Male with history of HTN, CAD presents with CP. Nephrology consulted for elevated Scr.    REVIEW OF SYSTEMS:  Gen: no changes in weight  Cards: no chest pain  Resp: no dyspnea  GI: no nausea or vomiting or diarrhea  Vascular: no LE edema    No Known Allergies      Hospital Medications: Medications reviewed    VITALS:  T(F): 97.7 (20 @ 05:35), Max: 98.3 (20 @ 21:47)  HR: 70 (20 @ 05:35)  BP: 126/83 (20 @ 05:35)  RR: 17 (20 @ 05:35)  SpO2: 100% (20 @ 05:35)  Wt(kg): --  Height (cm): 157.5 ( @ 00:45)  Weight (kg): 68 ( @ 06:02)  BMI (kg/m2): 27.4 ( @ 06:02)  BSA (m2): 1.69 ( @ 06:02)      PHYSICAL EXAM:    Gen: NAD, calm  Cards: RRR, +S1/S2, no M/G/R  Resp: CTA B/L  GI: soft, NT/ND, NABS  Vascular: no LE edema B/L    LABS:      135  |  103  |  29<H>  ----------------------------<  86  4.7   |  19<L>  |  1.94<H>    Ca    9.2      02 Dec 2020 05:18  Phos  4.5       Mg     2.0           Creatinine Trend: 1.94 <--, 1.86 <--, 1.80 <--, 1.79 <--, 1.81 <--                        12.9   8.25  )-----------( 270      ( 02 Dec 2020 06:02 )             40.9     Urine Studies:  Urinalysis Basic - ( 2020 04:50 )    Color: LIGHT YELLOW / Appearance: CLEAR / S.011 / pH: 7.0  Gluc: NEGATIVE / Ketone: NEGATIVE  / Bili: NEGATIVE / Urobili: NORMAL   Blood: NEGATIVE / Protein: NEGATIVE / Nitrite: NEGATIVE   Leuk Esterase: NEGATIVE / RBC:  / WBC    Sq Epi:  / Non Sq Epi:  / Bacteria:           RADIOLOGY & ADDITIONAL STUDIES:

## 2020-12-02 NOTE — DISCHARGE NOTE NURSING/CASE MANAGEMENT/SOCIAL WORK - PATIENT PORTAL LINK FT
You can access the FollowMyHealth Patient Portal offered by [facility] by registering at the following website: [email or web address]. By joining [employer]s FollowMyHealth portal, you will also be able to view your health information using other applications (apps) compatible with our system.

## 2020-12-02 NOTE — PROGRESS NOTE ADULT - ASSESSMENT
Cath Sep 2020, cath revealed patent cardiac stents  as well as patent SVG-OM  Cath 5/9/2018: PCI to prox LAD   Cath 11./30/2020:  patent proximal LAD and prox-mid RCA stents. Patent SVG to OM2. No new obstructive native or graft disease to account for chest pain.   ECHO 12/1/2020: grossly nl lV sys fx ef 63%    a/p  66 yo m h/o cabg 2015, multiple stents,  gout, ckd, PUD, hydrocephalus s/p ventriculostomy presents with left-sided chest pain     1. Atypical chest pain , CAD s/p PCI, CABG   -reproducible on exam , now resolved   -HS trop negative (13-12) no acs on ecg ,no decomp CHF on exam   -s/p plavix load,  hep gtt,   s/p Cath with patent proximal LAD and prox-mid RCA stents. Patent SVG to OM2. No new obstructive native or graft disease to account for chest pain.   -c/w asa and plavix,  imdur and Ranexa   -echo  grossly nl lV sys fx ef 63%  -c/w BB, statin     2. hyperkalemia, hyponatremia  s/p insulin/dextrose  - K, Na improving   -repeat ecg with no ischemic changes   -renal f.u noted  started sodium bicarbonate 650 mg twice daily.    DCP pending renal clearance   pt will follow up with his outpt cards     dvt ppx

## 2020-12-02 NOTE — PROGRESS NOTE ADULT - SUBJECTIVE AND OBJECTIVE BOX
Patient is a 65y old  Male who presents with a chief complaint of chest pain (02 Dec 2020 13:11)      SUBJECTIVE / OVERNIGHT EVENTS:    Events noted.  CONSTITUTIONAL: No fever,  or fatigue  RESPIRATORY: No cough, wheezing,  No shortness of breath  CARDIOVASCULAR: No chest pain, palpitations,   GASTROINTESTINAL: No abdominal or epigastric pain.   NEUROLOGICAL: No headaches,     MEDICATIONS  (STANDING):  amLODIPine   Tablet 5 milliGRAM(s) Oral daily  aspirin enteric coated 81 milliGRAM(s) Oral daily  atorvastatin 20 milliGRAM(s) Oral at bedtime  clopidogrel Tablet 75 milliGRAM(s) Oral daily  isosorbide   mononitrate ER Tablet (IMDUR) 30 milliGRAM(s) Oral daily  metoprolol tartrate 25 milliGRAM(s) Oral two times a day  pantoprazole    Tablet 40 milliGRAM(s) Oral before breakfast  ranolazine 1000 milliGRAM(s) Oral two times a day  sodium bicarbonate 650 milliGRAM(s) Oral two times a day    MEDICATIONS  (PRN):  acetaminophen   Tablet .. 650 milliGRAM(s) Oral every 6 hours PRN Temp greater or equal to 38C (100.4F), Mild Pain (1 - 3)        CAPILLARY BLOOD GLUCOSE        I&O's Summary      PHYSICAL EXAM:  GENERAL: NAD  NECK: Supple, No JVD  CHEST/LUNG: Clear to auscultation bilaterally; No wheezing.  HEART: Regular rate and rhythm; No murmurs, rubs, or gallops  ABDOMEN: Soft, Nontender, Nondistended; Bowel sounds present  EXTREMITIES:   No edema  NEUROLOGY: AAO X 3      LABS:                        12.9   8.25  )-----------( 270      ( 02 Dec 2020 06:02 )             40.9     12-02    135  |  103  |  29<H>  ----------------------------<  86  4.7   |  19<L>  |  1.94<H>    Ca    9.2      02 Dec 2020 05:18  Phos  4.5     12-01  Mg     2.0     12-01              CAPILLARY BLOOD GLUCOSE                    RADIOLOGY & ADDITIONAL TESTS:    Imaging Personally Reviewed:    Consultant(s) Notes Reviewed:      Care Discussed with Consultants/Other Providers:    Jared Michaud MD, CMD, FACP    [address] [place] [address]  Office Tel: [phone]  Cell: [phone]

## 2020-12-02 NOTE — PROGRESS NOTE ADULT - ASSESSMENT
Patient is a 65y old  Male who presents with a chief complaint of chest pain (01 Dec 2020 13:30).      Chest pain:    Tele  Cardio f/up noted.  ASA/Plavix/Ranexa  S/p C. Cath    HTN:  Cw amlodipine/Metoprolol    JOURDAN on CKD III:    BMP  Nephro f/up noted.

## 2021-01-14 ENCOUNTER — TRANSCRIPTION ENCOUNTER (OUTPATIENT)
Age: 66
End: 2021-01-14

## 2021-12-13 NOTE — ED PROVIDER NOTE - NS ED MD DISPO SPECIAL CONSIDERATION1
Pt is up to date on INR checks. Last OV with [facility] 11/17/21. Warfarin refilled per [facility] protocol.     Judy Hilliard RN     None

## 2023-07-19 NOTE — ED ADULT TRIAGE NOTE - STATUS:
Applied Minoxidil Pregnancy And Lactation Text: This medication has not been assigned a Pregnancy Risk Category but animal studies failed to show danger with the topical medication. It is unknown if the medication is excreted in breast milk.

## 2023-10-11 NOTE — DISCHARGE NOTE NURSING/CASE MANAGEMENT/SOCIAL WORK - NSDCPETBCESMAN_GEN_ALL_CORE
If you are a smoker, it is important for your health to stop smoking. Please be aware that second hand smoke is also harmful. Expiration Date (Optional): 09/2024

## 2024-06-02 NOTE — H&P ADULT - PROBLEM SELECTOR PLAN 2
Check lipid  Continue with current medications.   Low salt, low cholesterol diet
Dizziness/Impaired gait

## 2024-09-19 NOTE — DISCHARGE NOTE ADULT - PAIN PRESENT
Render In Strict Bullet Format?: No
Initiate Treatment: Econazole 1% cream apply to the bilateral breasts twice daily x 1 month
Detail Level: Zone
No